# Patient Record
Sex: MALE | Race: WHITE | HISPANIC OR LATINO
[De-identification: names, ages, dates, MRNs, and addresses within clinical notes are randomized per-mention and may not be internally consistent; named-entity substitution may affect disease eponyms.]

---

## 2017-08-25 ENCOUNTER — APPOINTMENT (OUTPATIENT)
Dept: INTERNAL MEDICINE | Facility: CLINIC | Age: 72
End: 2017-08-25
Payer: MEDICARE

## 2017-08-25 PROCEDURE — 99214 OFFICE O/P EST MOD 30 MIN: CPT

## 2018-02-26 ENCOUNTER — APPOINTMENT (OUTPATIENT)
Dept: INTERNAL MEDICINE | Facility: CLINIC | Age: 73
End: 2018-02-26
Payer: MEDICARE

## 2018-02-26 PROCEDURE — 99214 OFFICE O/P EST MOD 30 MIN: CPT

## 2018-05-25 ENCOUNTER — APPOINTMENT (OUTPATIENT)
Dept: INTERNAL MEDICINE | Facility: CLINIC | Age: 73
End: 2018-05-25
Payer: MEDICARE

## 2018-05-25 PROCEDURE — G0439: CPT | Mod: 25

## 2018-05-25 PROCEDURE — 93000 ELECTROCARDIOGRAM COMPLETE: CPT

## 2018-09-14 ENCOUNTER — APPOINTMENT (OUTPATIENT)
Dept: INTERNAL MEDICINE | Facility: CLINIC | Age: 73
End: 2018-09-14
Payer: MEDICARE

## 2018-09-14 VITALS
SYSTOLIC BLOOD PRESSURE: 150 MMHG | HEART RATE: 69 BPM | OXYGEN SATURATION: 95 % | DIASTOLIC BLOOD PRESSURE: 73 MMHG | TEMPERATURE: 98.1 F | RESPIRATION RATE: 12 BRPM | WEIGHT: 194 LBS

## 2018-09-14 DIAGNOSIS — Z87.11 PERSONAL HISTORY OF PEPTIC ULCER DISEASE: ICD-10-CM

## 2018-09-14 DIAGNOSIS — Z63.4 DISAPPEARANCE AND DEATH OF FAMILY MEMBER: ICD-10-CM

## 2018-09-14 DIAGNOSIS — M25.561 PAIN IN RIGHT KNEE: ICD-10-CM

## 2018-09-14 DIAGNOSIS — Z83.3 FAMILY HISTORY OF DIABETES MELLITUS: ICD-10-CM

## 2018-09-14 PROCEDURE — 99214 OFFICE O/P EST MOD 30 MIN: CPT

## 2018-09-14 SDOH — SOCIAL STABILITY - SOCIAL INSECURITY: DISSAPEARANCE AND DEATH OF FAMILY MEMBER: Z63.4

## 2018-09-14 NOTE — PHYSICAL EXAM

## 2018-09-14 NOTE — ASSESSMENT
[FreeTextEntry1] : F/U VISIT OF 72 Y OLD MALE WITH PMX OF HTN= STABLE ON CURRENT MEDS\par DYSLIPIDEMIA= CONTINUE CRESTOR DAILY\par IFG= DIET AND EXERCISE\par R KNEE ACUTE PAIN= LIMITED EXERCISE AS TOLERATED \par RTO 3 M WITH LABS

## 2018-09-14 NOTE — HISTORY OF PRESENT ILLNESS
[Family Member] : family member [de-identified] : PT COMES FOR F/U LABS AND HTN/IFG AND DYSLIPIDEMIA CONTROL.\par PT DEVELOPED R KNEE PAIN WHILE ON TREADMILL LAST WEEK,APPLIED ICE AND FEELING BETTER

## 2018-12-04 LAB
ALBUMIN SERPL ELPH-MCNC: 4.5 G/DL
ALP BLD-CCNC: 78 U/L
ALT SERPL-CCNC: 31 U/L
ANION GAP SERPL CALC-SCNC: 11 MMOL/L
AST SERPL-CCNC: 26 U/L
BILIRUB SERPL-MCNC: 0.6 MG/DL
BUN SERPL-MCNC: 18 MG/DL
CALCIUM SERPL-MCNC: 9.7 MG/DL
CHLORIDE SERPL-SCNC: 106 MMOL/L
CHOLEST SERPL-MCNC: 154 MG/DL
CHOLEST/HDLC SERPL: 3.5 RATIO
CO2 SERPL-SCNC: 27 MMOL/L
CREAT SERPL-MCNC: 1.18 MG/DL
GLUCOSE SERPL-MCNC: 106 MG/DL
HBA1C MFR BLD HPLC: 6.2 %
HDLC SERPL-MCNC: 44 MG/DL
LDLC SERPL CALC-MCNC: 82 MG/DL
POTASSIUM SERPL-SCNC: 4.7 MMOL/L
PROT SERPL-MCNC: 7.9 G/DL
SODIUM SERPL-SCNC: 144 MMOL/L
TRIGL SERPL-MCNC: 142 MG/DL

## 2018-12-10 ENCOUNTER — APPOINTMENT (OUTPATIENT)
Dept: INTERNAL MEDICINE | Facility: CLINIC | Age: 73
End: 2018-12-10
Payer: MEDICARE

## 2018-12-10 VITALS — DIASTOLIC BLOOD PRESSURE: 75 MMHG | SYSTOLIC BLOOD PRESSURE: 125 MMHG

## 2018-12-10 VITALS
WEIGHT: 189 LBS | TEMPERATURE: 97.7 F | HEART RATE: 101 BPM | DIASTOLIC BLOOD PRESSURE: 73 MMHG | SYSTOLIC BLOOD PRESSURE: 138 MMHG | OXYGEN SATURATION: 95 %

## 2018-12-10 PROCEDURE — 99214 OFFICE O/P EST MOD 30 MIN: CPT

## 2018-12-10 NOTE — ASSESSMENT
[FreeTextEntry1] : F/U VISIT OF 73 Y OLD MALE WITH PMX OF HTN= CONTINUE AMLODIPINE AND OLMESARTAN THE SAME\par DYSLIPIDEMIA= STABLE ON ROSUVASTATIN 20 MG DAILY\par GERD= STABLE\par IFG= HBA1C OF 6.2= LOW CARB DIET AND WT LOSS/EXERCISE RECOMM\par RTO 3 M WITH LABS

## 2019-03-21 LAB
ALBUMIN SERPL ELPH-MCNC: 4.2 G/DL
ALP BLD-CCNC: 64 U/L
ALT SERPL-CCNC: 27 U/L
ANION GAP SERPL CALC-SCNC: 11 MMOL/L
AST SERPL-CCNC: 33 U/L
BILIRUB SERPL-MCNC: 0.5 MG/DL
BUN SERPL-MCNC: 18 MG/DL
CALCIUM SERPL-MCNC: 9.5 MG/DL
CHLORIDE SERPL-SCNC: 104 MMOL/L
CHOLEST SERPL-MCNC: 150 MG/DL
CHOLEST/HDLC SERPL: 3.3 RATIO
CO2 SERPL-SCNC: 26 MMOL/L
CREAT SERPL-MCNC: 1.03 MG/DL
GLUCOSE SERPL-MCNC: 92 MG/DL
HDLC SERPL-MCNC: 45 MG/DL
LDLC SERPL CALC-MCNC: 85 MG/DL
POTASSIUM SERPL-SCNC: 4.6 MMOL/L
PROT SERPL-MCNC: 7.8 G/DL
SODIUM SERPL-SCNC: 141 MMOL/L
TRIGL SERPL-MCNC: 102 MG/DL

## 2019-03-27 ENCOUNTER — APPOINTMENT (OUTPATIENT)
Dept: INTERNAL MEDICINE | Facility: CLINIC | Age: 74
End: 2019-03-27
Payer: MEDICARE

## 2019-03-27 VITALS
OXYGEN SATURATION: 97 % | TEMPERATURE: 97.3 F | HEART RATE: 88 BPM | BODY MASS INDEX: 29.1 KG/M2 | WEIGHT: 192 LBS | SYSTOLIC BLOOD PRESSURE: 156 MMHG | RESPIRATION RATE: 12 BRPM | HEIGHT: 68 IN | DIASTOLIC BLOOD PRESSURE: 77 MMHG

## 2019-03-27 VITALS — SYSTOLIC BLOOD PRESSURE: 140 MMHG | DIASTOLIC BLOOD PRESSURE: 70 MMHG

## 2019-03-27 PROCEDURE — 99214 OFFICE O/P EST MOD 30 MIN: CPT

## 2019-03-27 NOTE — ASSESSMENT
[FreeTextEntry1] : F/U VISIT OF 73 Y OLD MALE WITH PMX OF HTN= STABLE ON CURRENT MEDS\par DYSLIPIDEMIA= CONTINUE CRESTOR DAILY\par HBA1C OF 6.6= LOW CARB DIET AND LABS IN 3M \par RTO FOR CPE

## 2019-04-01 LAB — HBA1C MFR BLD HPLC: 6.6 %

## 2019-04-12 ENCOUNTER — RX RENEWAL (OUTPATIENT)
Age: 74
End: 2019-04-12

## 2019-06-13 ENCOUNTER — LABORATORY RESULT (OUTPATIENT)
Age: 74
End: 2019-06-13

## 2019-06-17 ENCOUNTER — APPOINTMENT (OUTPATIENT)
Dept: INTERNAL MEDICINE | Facility: CLINIC | Age: 74
End: 2019-06-17
Payer: MEDICARE

## 2019-06-17 ENCOUNTER — NON-APPOINTMENT (OUTPATIENT)
Age: 74
End: 2019-06-17

## 2019-06-17 VITALS
HEART RATE: 78 BPM | TEMPERATURE: 99.1 F | RESPIRATION RATE: 14 BRPM | HEIGHT: 68 IN | BODY MASS INDEX: 28.19 KG/M2 | SYSTOLIC BLOOD PRESSURE: 136 MMHG | OXYGEN SATURATION: 95 % | WEIGHT: 186 LBS | DIASTOLIC BLOOD PRESSURE: 68 MMHG

## 2019-06-17 LAB
25(OH)D3 SERPL-MCNC: 27.8 NG/ML
ALBUMIN SERPL ELPH-MCNC: 4.7 G/DL
ALP BLD-CCNC: 77 U/L
ALT SERPL-CCNC: 29 U/L
ANION GAP SERPL CALC-SCNC: 12 MMOL/L
APPEARANCE: CLEAR
AST SERPL-CCNC: 28 U/L
BASOPHILS # BLD AUTO: 0.07 K/UL
BASOPHILS NFR BLD AUTO: 0.9 %
BILIRUB SERPL-MCNC: 0.7 MG/DL
BILIRUBIN URINE: NEGATIVE
BLOOD URINE: NEGATIVE
BUN SERPL-MCNC: 21 MG/DL
CALCIUM SERPL-MCNC: 9.6 MG/DL
CHLORIDE SERPL-SCNC: 102 MMOL/L
CHOLEST SERPL-MCNC: 152 MG/DL
CHOLEST/HDLC SERPL: 3.5 RATIO
CO2 SERPL-SCNC: 26 MMOL/L
COLOR: YELLOW
CREAT SERPL-MCNC: 1.3 MG/DL
EOSINOPHIL # BLD AUTO: 0.9 K/UL
EOSINOPHIL NFR BLD AUTO: 12 %
ESTIMATED AVERAGE GLUCOSE: 131 MG/DL
GLUCOSE QUALITATIVE U: NEGATIVE
GLUCOSE SERPL-MCNC: 98 MG/DL
HBA1C MFR BLD HPLC: 6.2 %
HCT VFR BLD CALC: 45.3 %
HDLC SERPL-MCNC: 43 MG/DL
HGB BLD-MCNC: 13.8 G/DL
IMM GRANULOCYTES NFR BLD AUTO: 0.1 %
KETONES URINE: NEGATIVE
LDLC SERPL CALC-MCNC: 86 MG/DL
LEUKOCYTE ESTERASE URINE: NEGATIVE
LYMPHOCYTES # BLD AUTO: 1.89 K/UL
LYMPHOCYTES NFR BLD AUTO: 25.2 %
MAN DIFF?: NORMAL
MCHC RBC-ENTMCNC: 29.5 PG
MCHC RBC-ENTMCNC: 30.5 GM/DL
MCV RBC AUTO: 96.8 FL
MONOCYTES # BLD AUTO: 0.56 K/UL
MONOCYTES NFR BLD AUTO: 7.5 %
NEUTROPHILS # BLD AUTO: 4.06 K/UL
NEUTROPHILS NFR BLD AUTO: 54.3 %
NITRITE URINE: NEGATIVE
PH URINE: 5
PLATELET # BLD AUTO: 233 K/UL
POTASSIUM SERPL-SCNC: 5.3 MMOL/L
PROT SERPL-MCNC: 7.6 G/DL
PROTEIN URINE: NEGATIVE
RBC # BLD: 4.68 M/UL
RBC # FLD: 14.5 %
SODIUM SERPL-SCNC: 140 MMOL/L
SPECIFIC GRAVITY URINE: 1.02
TRIGL SERPL-MCNC: 113 MG/DL
TSH SERPL-ACNC: 1.12 UIU/ML
UROBILINOGEN URINE: NORMAL
WBC # FLD AUTO: 7.49 K/UL

## 2019-06-17 PROCEDURE — 93000 ELECTROCARDIOGRAM COMPLETE: CPT

## 2019-06-17 PROCEDURE — 99397 PER PM REEVAL EST PAT 65+ YR: CPT

## 2019-06-17 NOTE — ASSESSMENT
[FreeTextEntry1] : CPE OF 73 Y OLD MALE WITH PMX OF HTN ,DYSLIPIDEMIA ,IFG AND BPH= CONTINUE CURRENT MEDS\par EKG TODAY \par RTO IN 3 M OR PRN

## 2019-07-24 ENCOUNTER — OTHER (OUTPATIENT)
Age: 74
End: 2019-07-24

## 2019-09-22 LAB
ALBUMIN SERPL ELPH-MCNC: 4.6 G/DL
ALP BLD-CCNC: 71 U/L
ALT SERPL-CCNC: 29 U/L
ANION GAP SERPL CALC-SCNC: 12 MMOL/L
AST SERPL-CCNC: 29 U/L
BILIRUB SERPL-MCNC: 0.6 MG/DL
BUN SERPL-MCNC: 22 MG/DL
CALCIUM SERPL-MCNC: 9.6 MG/DL
CHLORIDE SERPL-SCNC: 107 MMOL/L
CHOLEST SERPL-MCNC: 149 MG/DL
CHOLEST/HDLC SERPL: 3.3 RATIO
CO2 SERPL-SCNC: 25 MMOL/L
CREAT SERPL-MCNC: 1.17 MG/DL
ESTIMATED AVERAGE GLUCOSE: 131 MG/DL
GLUCOSE SERPL-MCNC: 96 MG/DL
HBA1C MFR BLD HPLC: 6.2 %
HDLC SERPL-MCNC: 45 MG/DL
LDLC SERPL CALC-MCNC: 81 MG/DL
POTASSIUM SERPL-SCNC: 4.8 MMOL/L
PROT SERPL-MCNC: 7.5 G/DL
SODIUM SERPL-SCNC: 144 MMOL/L
TRIGL SERPL-MCNC: 117 MG/DL

## 2019-09-23 ENCOUNTER — APPOINTMENT (OUTPATIENT)
Dept: INTERNAL MEDICINE | Facility: CLINIC | Age: 74
End: 2019-09-23
Payer: MEDICARE

## 2019-09-23 VITALS
TEMPERATURE: 99 F | WEIGHT: 188 LBS | OXYGEN SATURATION: 95 % | DIASTOLIC BLOOD PRESSURE: 74 MMHG | HEIGHT: 68 IN | BODY MASS INDEX: 28.49 KG/M2 | RESPIRATION RATE: 12 BRPM | HEART RATE: 82 BPM | SYSTOLIC BLOOD PRESSURE: 148 MMHG

## 2019-09-23 VITALS — SYSTOLIC BLOOD PRESSURE: 135 MMHG | DIASTOLIC BLOOD PRESSURE: 80 MMHG

## 2019-09-23 PROCEDURE — 99214 OFFICE O/P EST MOD 30 MIN: CPT

## 2019-09-23 NOTE — ASSESSMENT
[FreeTextEntry1] : F/U VISIT OF 73 Y OLD MALE WITH PMX OF HTN = STABLE\par DYSLIPIDEMIA= STABLE\par IFG = HB1C 6.2 \par EDMOND AND ASTHMA= RX SENT\par DECREASE VIT D3 TO 2000 IU EOD DUE TO GI SIDE EFFECTS\par RTO IN 3 M

## 2019-09-23 NOTE — HISTORY OF PRESENT ILLNESS
[de-identified] : PT COMES FOR F/U HTN ,IFG AND DYSLIPIDEMIA\par CC OF LOOSE BM WHEN TAKING VIT D 2000 IU DAILY\par NEEDS FLONASE AND ALBUTEROL INH FOR SPORADIC USE FOR EDMOND AND ASTHMA

## 2019-10-28 ENCOUNTER — MEDICATION RENEWAL (OUTPATIENT)
Age: 74
End: 2019-10-28

## 2019-10-28 ENCOUNTER — MESSAGE (OUTPATIENT)
Age: 74
End: 2019-10-28

## 2019-12-18 ENCOUNTER — APPOINTMENT (OUTPATIENT)
Dept: HEART AND VASCULAR | Facility: CLINIC | Age: 74
End: 2019-12-18
Payer: MEDICARE

## 2019-12-18 PROCEDURE — 36415 COLL VENOUS BLD VENIPUNCTURE: CPT

## 2019-12-19 LAB
ALBUMIN SERPL ELPH-MCNC: 4.3 G/DL
ALP BLD-CCNC: 83 U/L
ALT SERPL-CCNC: 28 U/L
ANION GAP SERPL CALC-SCNC: 16 MMOL/L
AST SERPL-CCNC: 26 U/L
BILIRUB SERPL-MCNC: 0.5 MG/DL
BUN SERPL-MCNC: 26 MG/DL
CALCIUM SERPL-MCNC: 9.6 MG/DL
CHLORIDE SERPL-SCNC: 101 MMOL/L
CHOLEST SERPL-MCNC: 203 MG/DL
CHOLEST/HDLC SERPL: 4.8 RATIO
CO2 SERPL-SCNC: 23 MMOL/L
CREAT SERPL-MCNC: 1.31 MG/DL
ESTIMATED AVERAGE GLUCOSE: 137 MG/DL
GLUCOSE SERPL-MCNC: 109 MG/DL
HBA1C MFR BLD HPLC: 6.4 %
HDLC SERPL-MCNC: 42 MG/DL
LDLC SERPL CALC-MCNC: 132 MG/DL
POTASSIUM SERPL-SCNC: 5.2 MMOL/L
PROT SERPL-MCNC: 7.4 G/DL
SODIUM SERPL-SCNC: 140 MMOL/L
TRIGL SERPL-MCNC: 146 MG/DL

## 2019-12-23 ENCOUNTER — APPOINTMENT (OUTPATIENT)
Dept: INTERNAL MEDICINE | Facility: CLINIC | Age: 74
End: 2019-12-23
Payer: MEDICARE

## 2019-12-23 VITALS
WEIGHT: 182 LBS | TEMPERATURE: 98.9 F | HEIGHT: 68 IN | OXYGEN SATURATION: 95 % | RESPIRATION RATE: 14 BRPM | DIASTOLIC BLOOD PRESSURE: 65 MMHG | SYSTOLIC BLOOD PRESSURE: 116 MMHG | BODY MASS INDEX: 27.58 KG/M2 | HEART RATE: 103 BPM

## 2019-12-23 DIAGNOSIS — L85.3 XEROSIS CUTIS: ICD-10-CM

## 2019-12-23 DIAGNOSIS — H61.21 IMPACTED CERUMEN, RIGHT EAR: ICD-10-CM

## 2019-12-23 PROCEDURE — 99214 OFFICE O/P EST MOD 30 MIN: CPT

## 2019-12-23 NOTE — ASSESSMENT
[FreeTextEntry1] : F/U VISIT OF 74 Y OLD MALE WITH PMX OF HTN = STABLE \par IFG= STRICT DIET\par DYSLIPIDEMIA= COMPLIANCE WITH ROSUVASTATIN RECOMM \par CERUMEN IMPACTION R EAR= DEBROX RX\par DRY SKIN DERMATITIS= LACTATE 12 % CREAM

## 2019-12-23 NOTE — HISTORY OF PRESENT ILLNESS
[de-identified] : PT COMES FOR F/U \par CC OF DECREASED HEARING R EAR FOR 2 WEEKS AFTER ATTEMPTING TO CLEAN IT WITH Q TIP\par DRY AND ITCHY SKIN SPECIALLY IN THE EVENINGS\par HAS BEEN NON COMPLAINT WITH DIET AND ROSUVASTATIN ON /OFF LAST MONTH OR SO

## 2019-12-23 NOTE — PHYSICAL EXAM
[No Acute Distress] : no acute distress [Well Nourished] : well nourished [Well Developed] : well developed [Well-Appearing] : well-appearing [Normal Sclera/Conjunctiva] : normal sclera/conjunctiva [PERRL] : pupils equal round and reactive to light [Normal Outer Ear/Nose] : the outer ears and nose were normal in appearance [EOMI] : extraocular movements intact [Normal Oropharynx] : the oropharynx was normal [No JVD] : no jugular venous distention [No Lymphadenopathy] : no lymphadenopathy [Supple] : supple [Thyroid Normal, No Nodules] : the thyroid was normal and there were no nodules present [No Respiratory Distress] : no respiratory distress  [No Accessory Muscle Use] : no accessory muscle use [Normal Rate] : normal rate  [Clear to Auscultation] : lungs were clear to auscultation bilaterally [Normal S1, S2] : normal S1 and S2 [Regular Rhythm] : with a regular rhythm [No Murmur] : no murmur heard [No Carotid Bruits] : no carotid bruits [No Varicosities] : no varicosities [No Abdominal Bruit] : a ~M bruit was not heard ~T in the abdomen [Pedal Pulses Present] : the pedal pulses are present [No Edema] : there was no peripheral edema [No Palpable Aorta] : no palpable aorta [No Extremity Clubbing/Cyanosis] : no extremity clubbing/cyanosis [Soft] : abdomen soft [Non Tender] : non-tender [Non-distended] : non-distended [No Masses] : no abdominal mass palpated [No HSM] : no HSM [Normal Bowel Sounds] : normal bowel sounds [Normal Posterior Cervical Nodes] : no posterior cervical lymphadenopathy [Normal Anterior Cervical Nodes] : no anterior cervical lymphadenopathy [No CVA Tenderness] : no CVA  tenderness [No Spinal Tenderness] : no spinal tenderness [Grossly Normal Strength/Tone] : grossly normal strength/tone [No Joint Swelling] : no joint swelling [No Rash] : no rash [Coordination Grossly Intact] : coordination grossly intact [No Focal Deficits] : no focal deficits [Normal Gait] : normal gait [Deep Tendon Reflexes (DTR)] : deep tendon reflexes were 2+ and symmetric [Normal Affect] : the affect was normal [Normal Insight/Judgement] : insight and judgment were intact [de-identified] : R CERUMEN IMPACTION

## 2020-03-27 ENCOUNTER — APPOINTMENT (OUTPATIENT)
Dept: INTERNAL MEDICINE | Facility: CLINIC | Age: 75
End: 2020-03-27

## 2020-05-01 ENCOUNTER — LABORATORY RESULT (OUTPATIENT)
Age: 75
End: 2020-05-01

## 2020-05-04 ENCOUNTER — LABORATORY RESULT (OUTPATIENT)
Age: 75
End: 2020-05-04

## 2020-05-04 ENCOUNTER — APPOINTMENT (OUTPATIENT)
Dept: INTERNAL MEDICINE | Facility: CLINIC | Age: 75
End: 2020-05-04
Payer: MEDICARE

## 2020-05-04 VITALS
TEMPERATURE: 98 F | SYSTOLIC BLOOD PRESSURE: 150 MMHG | WEIGHT: 179 LBS | OXYGEN SATURATION: 96 % | HEIGHT: 68 IN | HEART RATE: 86 BPM | DIASTOLIC BLOOD PRESSURE: 74 MMHG | BODY MASS INDEX: 27.13 KG/M2 | RESPIRATION RATE: 12 BRPM

## 2020-05-04 PROCEDURE — 99214 OFFICE O/P EST MOD 30 MIN: CPT

## 2020-05-04 NOTE — ASSESSMENT
[FreeTextEntry1] : F/U VISIT OF 74 Y OLD MALE WITH PMX OF IFG= HBA1C DOWN TO 6.2 ,CONTINUE DIET AND EXERCISE\par HTN = STABLE \par DYSLIPIDEMIA= CHOL IS BETTER,CONTINUE ROSUVASTATIN \par RTO FOR CPE 8/2020;LABS ORDERED,MEDS RX SENT

## 2020-05-04 NOTE — PHYSICAL EXAM
[No Acute Distress] : no acute distress [Well Nourished] : well nourished [Well Developed] : well developed [Well-Appearing] : well-appearing [Normal Sclera/Conjunctiva] : normal sclera/conjunctiva [EOMI] : extraocular movements intact [PERRL] : pupils equal round and reactive to light [No Lymphadenopathy] : no lymphadenopathy [No JVD] : no jugular venous distention [Supple] : supple [Thyroid Normal, No Nodules] : the thyroid was normal and there were no nodules present [No Respiratory Distress] : no respiratory distress  [No Accessory Muscle Use] : no accessory muscle use [Clear to Auscultation] : lungs were clear to auscultation bilaterally [Normal Rate] : normal rate  [Regular Rhythm] : with a regular rhythm [Normal S1, S2] : normal S1 and S2 [No Murmur] : no murmur heard [No Carotid Bruits] : no carotid bruits [No Abdominal Bruit] : a ~M bruit was not heard ~T in the abdomen [No Varicosities] : no varicosities [Pedal Pulses Present] : the pedal pulses are present [No Edema] : there was no peripheral edema [No Palpable Aorta] : no palpable aorta [No Extremity Clubbing/Cyanosis] : no extremity clubbing/cyanosis [Soft] : abdomen soft [Non-distended] : non-distended [Non Tender] : non-tender [No Masses] : no abdominal mass palpated [Normal Bowel Sounds] : normal bowel sounds [No HSM] : no HSM [Normal Posterior Cervical Nodes] : no posterior cervical lymphadenopathy [Normal Anterior Cervical Nodes] : no anterior cervical lymphadenopathy [No CVA Tenderness] : no CVA  tenderness [No Spinal Tenderness] : no spinal tenderness [No Joint Swelling] : no joint swelling [Grossly Normal Strength/Tone] : grossly normal strength/tone [Coordination Grossly Intact] : coordination grossly intact [No Rash] : no rash [No Focal Deficits] : no focal deficits [Normal Gait] : normal gait [Deep Tendon Reflexes (DTR)] : deep tendon reflexes were 2+ and symmetric [Normal Insight/Judgement] : insight and judgment were intact [Normal Affect] : the affect was normal

## 2020-05-04 NOTE — HISTORY OF PRESENT ILLNESS
[de-identified] : PT COMES FOR F/U HTN ,DYSLIPIDEMIA  AND IFG \par FEELING FINE EXERCISES DAILY AND OFFERS NO COMPLAINS

## 2020-07-30 ENCOUNTER — LABORATORY RESULT (OUTPATIENT)
Age: 75
End: 2020-07-30

## 2020-07-30 ENCOUNTER — APPOINTMENT (OUTPATIENT)
Dept: HEART AND VASCULAR | Facility: CLINIC | Age: 75
End: 2020-07-30
Payer: MEDICARE

## 2020-07-30 PROCEDURE — 36415 COLL VENOUS BLD VENIPUNCTURE: CPT

## 2020-07-31 LAB
25(OH)D3 SERPL-MCNC: 31.5 NG/ML
ALBUMIN SERPL ELPH-MCNC: 4.7 G/DL
ALP BLD-CCNC: 80 U/L
ALT SERPL-CCNC: 29 U/L
ANION GAP SERPL CALC-SCNC: 17 MMOL/L
APPEARANCE: ABNORMAL
AST SERPL-CCNC: 28 U/L
BASOPHILS # BLD AUTO: 0.09 K/UL
BASOPHILS NFR BLD AUTO: 1.3 %
BILIRUB SERPL-MCNC: 0.7 MG/DL
BILIRUBIN URINE: NEGATIVE
BLOOD URINE: NORMAL
BUN SERPL-MCNC: 20 MG/DL
CALCIUM SERPL-MCNC: 9.2 MG/DL
CHLORIDE SERPL-SCNC: 106 MMOL/L
CHOLEST SERPL-MCNC: 144 MG/DL
CHOLEST/HDLC SERPL: 3.1 RATIO
CO2 SERPL-SCNC: 19 MMOL/L
COLOR: YELLOW
CREAT SERPL-MCNC: 1.16 MG/DL
EOSINOPHIL # BLD AUTO: 0.4 K/UL
EOSINOPHIL NFR BLD AUTO: 5.6 %
ESTIMATED AVERAGE GLUCOSE: 128 MG/DL
GLUCOSE QUALITATIVE U: NEGATIVE
GLUCOSE SERPL-MCNC: 111 MG/DL
HBA1C MFR BLD HPLC: 6.1 %
HCT VFR BLD CALC: 44.8 %
HDLC SERPL-MCNC: 46 MG/DL
HGB BLD-MCNC: 14.3 G/DL
IMM GRANULOCYTES NFR BLD AUTO: 0.3 %
KETONES URINE: NEGATIVE
LDLC SERPL CALC-MCNC: 80 MG/DL
LEUKOCYTE ESTERASE URINE: NEGATIVE
LYMPHOCYTES # BLD AUTO: 1.09 K/UL
LYMPHOCYTES NFR BLD AUTO: 15.2 %
MAN DIFF?: NORMAL
MCHC RBC-ENTMCNC: 29.5 PG
MCHC RBC-ENTMCNC: 31.9 GM/DL
MCV RBC AUTO: 92.4 FL
MONOCYTES # BLD AUTO: 0.47 K/UL
MONOCYTES NFR BLD AUTO: 6.5 %
NEUTROPHILS # BLD AUTO: 5.12 K/UL
NEUTROPHILS NFR BLD AUTO: 71.1 %
NITRITE URINE: NEGATIVE
PH URINE: 5
PLATELET # BLD AUTO: 245 K/UL
POTASSIUM SERPL-SCNC: 4.5 MMOL/L
PROT SERPL-MCNC: 7.5 G/DL
PROTEIN URINE: NORMAL
PSA FREE FLD-MCNC: 18 %
PSA FREE SERPL-MCNC: 1.1 NG/ML
PSA SERPL-MCNC: 6.22 NG/ML
RBC # BLD: 4.85 M/UL
RBC # FLD: 13.6 %
SODIUM SERPL-SCNC: 142 MMOL/L
SPECIFIC GRAVITY URINE: 1.02
TRIGL SERPL-MCNC: 89 MG/DL
TSH SERPL-ACNC: 0.63 UIU/ML
UROBILINOGEN URINE: NORMAL
WBC # FLD AUTO: 7.19 K/UL

## 2020-08-05 ENCOUNTER — NON-APPOINTMENT (OUTPATIENT)
Age: 75
End: 2020-08-05

## 2020-08-05 ENCOUNTER — APPOINTMENT (OUTPATIENT)
Dept: INTERNAL MEDICINE | Facility: CLINIC | Age: 75
End: 2020-08-05
Payer: MEDICARE

## 2020-08-05 VITALS
HEIGHT: 68 IN | TEMPERATURE: 98.1 F | DIASTOLIC BLOOD PRESSURE: 67 MMHG | HEART RATE: 94 BPM | OXYGEN SATURATION: 96 % | SYSTOLIC BLOOD PRESSURE: 142 MMHG | WEIGHT: 175 LBS | BODY MASS INDEX: 26.52 KG/M2 | RESPIRATION RATE: 14 BRPM

## 2020-08-05 PROCEDURE — 93000 ELECTROCARDIOGRAM COMPLETE: CPT

## 2020-08-05 PROCEDURE — G0439: CPT

## 2020-08-05 RX ORDER — ASPIRIN 81 MG
6.5 TABLET, DELAYED RELEASE (ENTERIC COATED) ORAL
Qty: 1 | Refills: 0 | Status: DISCONTINUED | COMMUNITY
Start: 2019-12-23 | End: 2020-08-05

## 2020-08-05 NOTE — ASSESSMENT
[FreeTextEntry1] : CPE OF 74 Y OLD MALE WITH PMX OF HTN ,DYSLIPIDEMIA AND IFG = STABLE ON AMLODIPINE ,CRESTOR AND OLMESARTAN\par HIGH PSA= AS PER  ,FOR MRI THIS WEEK\par RTO IN 3 M OR PRN

## 2020-08-05 NOTE — HISTORY OF PRESENT ILLNESS
[de-identified] : PT SEEN BY  LAST WEEK FOR ELEVATED PSA AND WILL AHVE MRI IN 2 DAYS\par HAD BIOPSY NEG 3 Y AGO ,HAD ? TURP 20 Y AGO ;HAS NO  SX WHATSOEVER\par CC OF CANKER SORE FEW WEEKS AGO ,GETTING BETTER NOW

## 2020-08-05 NOTE — PHYSICAL EXAM
[No Acute Distress] : no acute distress [Well Developed] : well developed [Well Nourished] : well nourished [Normal Sclera/Conjunctiva] : normal sclera/conjunctiva [PERRL] : pupils equal round and reactive to light [Well-Appearing] : well-appearing [EOMI] : extraocular movements intact [Normal Outer Ear/Nose] : the outer ears and nose were normal in appearance [Normal Oropharynx] : the oropharynx was normal [No JVD] : no jugular venous distention [No Lymphadenopathy] : no lymphadenopathy [No Respiratory Distress] : no respiratory distress  [Thyroid Normal, No Nodules] : the thyroid was normal and there were no nodules present [Supple] : supple [Clear to Auscultation] : lungs were clear to auscultation bilaterally [Normal Rate] : normal rate  [No Accessory Muscle Use] : no accessory muscle use [Regular Rhythm] : with a regular rhythm [Normal S1, S2] : normal S1 and S2 [No Murmur] : no murmur heard [No Abdominal Bruit] : a ~M bruit was not heard ~T in the abdomen [No Carotid Bruits] : no carotid bruits [Pedal Pulses Present] : the pedal pulses are present [No Edema] : there was no peripheral edema [No Varicosities] : no varicosities [No Extremity Clubbing/Cyanosis] : no extremity clubbing/cyanosis [Soft] : abdomen soft [No Palpable Aorta] : no palpable aorta [Non Tender] : non-tender [Non-distended] : non-distended [No Masses] : no abdominal mass palpated [No HSM] : no HSM [Normal Anterior Cervical Nodes] : no anterior cervical lymphadenopathy [Normal Bowel Sounds] : normal bowel sounds [Normal Posterior Cervical Nodes] : no posterior cervical lymphadenopathy [No CVA Tenderness] : no CVA  tenderness [No Spinal Tenderness] : no spinal tenderness [No Joint Swelling] : no joint swelling [No Rash] : no rash [Grossly Normal Strength/Tone] : grossly normal strength/tone [No Focal Deficits] : no focal deficits [Coordination Grossly Intact] : coordination grossly intact [Normal Gait] : normal gait [Normal Affect] : the affect was normal [Normal Insight/Judgement] : insight and judgment were intact [de-identified] : SINGLE LEFT ORAL MUCOSAE CANKER SORE

## 2020-11-06 ENCOUNTER — APPOINTMENT (OUTPATIENT)
Dept: HEART AND VASCULAR | Facility: CLINIC | Age: 75
End: 2020-11-06
Payer: MEDICARE

## 2020-11-06 PROCEDURE — 36415 COLL VENOUS BLD VENIPUNCTURE: CPT

## 2020-11-06 PROCEDURE — 99072 ADDL SUPL MATRL&STAF TM PHE: CPT

## 2020-11-07 LAB
ALBUMIN SERPL ELPH-MCNC: 4.6 G/DL
ALP BLD-CCNC: 81 U/L
ALT SERPL-CCNC: 33 U/L
ANION GAP SERPL CALC-SCNC: 10 MMOL/L
AST SERPL-CCNC: 30 U/L
BILIRUB SERPL-MCNC: 0.5 MG/DL
BUN SERPL-MCNC: 24 MG/DL
CALCIUM SERPL-MCNC: 9.7 MG/DL
CHLORIDE SERPL-SCNC: 107 MMOL/L
CHOLEST SERPL-MCNC: 145 MG/DL
CO2 SERPL-SCNC: 27 MMOL/L
CREAT SERPL-MCNC: 1.4 MG/DL
ESTIMATED AVERAGE GLUCOSE: 143 MG/DL
GLUCOSE SERPL-MCNC: 101 MG/DL
HBA1C MFR BLD HPLC: 6.6 %
HDLC SERPL-MCNC: 46 MG/DL
LDLC SERPL CALC-MCNC: 82 MG/DL
NONHDLC SERPL-MCNC: 99 MG/DL
POTASSIUM SERPL-SCNC: 5.7 MMOL/L
PROT SERPL-MCNC: 7.6 G/DL
SODIUM SERPL-SCNC: 143 MMOL/L
TRIGL SERPL-MCNC: 82 MG/DL

## 2020-11-09 ENCOUNTER — APPOINTMENT (OUTPATIENT)
Dept: INTERNAL MEDICINE | Facility: CLINIC | Age: 75
End: 2020-11-09
Payer: MEDICARE

## 2020-11-09 VITALS
RESPIRATION RATE: 14 BRPM | HEIGHT: 68 IN | SYSTOLIC BLOOD PRESSURE: 145 MMHG | BODY MASS INDEX: 27.28 KG/M2 | OXYGEN SATURATION: 97 % | TEMPERATURE: 98.6 F | HEART RATE: 76 BPM | DIASTOLIC BLOOD PRESSURE: 72 MMHG | WEIGHT: 180 LBS

## 2020-11-09 DIAGNOSIS — Z23 ENCOUNTER FOR IMMUNIZATION: ICD-10-CM

## 2020-11-09 DIAGNOSIS — K13.79 OTHER LESIONS OF ORAL MUCOSA: ICD-10-CM

## 2020-11-09 PROCEDURE — 99214 OFFICE O/P EST MOD 30 MIN: CPT | Mod: 25

## 2020-11-09 PROCEDURE — G0008: CPT

## 2020-11-09 PROCEDURE — 90662 IIV NO PRSV INCREASED AG IM: CPT

## 2020-11-09 PROCEDURE — 99072 ADDL SUPL MATRL&STAF TM PHE: CPT

## 2020-11-09 NOTE — ASSESSMENT
[FreeTextEntry1] : 75 Y OLD MALE WITH PMX OF HTN  WITH ELEVATED K AT 5.7 AND CREATININE 1.4 1 WEEK AGO = BMP TODAY \par IFG=EARLY DM = STRICT LOW CARB DIET AND EXERCISE\par DYSLIPIDEMIA= STABLE\par INFLUENZA VACCINE TODAY \par RTO IN 3 M OR AS PER LABS

## 2020-11-09 NOTE — HISTORY OF PRESENT ILLNESS
[de-identified] : PT COMES FOR F/U OFFERS NO NEW COMPLAINS,STILL HAVING SOME MOUTH SORES LEFT SIDE FOR 3 M

## 2020-11-17 LAB
ANION GAP SERPL CALC-SCNC: 13 MMOL/L
BUN SERPL-MCNC: 18 MG/DL
CALCIUM SERPL-MCNC: 9.6 MG/DL
CHLORIDE SERPL-SCNC: 103 MMOL/L
CO2 SERPL-SCNC: 25 MMOL/L
CREAT SERPL-MCNC: 1.18 MG/DL
GLUCOSE SERPL-MCNC: 89 MG/DL
POTASSIUM SERPL-SCNC: 4.8 MMOL/L
SODIUM SERPL-SCNC: 140 MMOL/L

## 2021-02-05 ENCOUNTER — APPOINTMENT (OUTPATIENT)
Dept: HEART AND VASCULAR | Facility: CLINIC | Age: 76
End: 2021-02-05
Payer: MEDICARE

## 2021-02-05 PROCEDURE — 36415 COLL VENOUS BLD VENIPUNCTURE: CPT

## 2021-02-05 PROCEDURE — 99072 ADDL SUPL MATRL&STAF TM PHE: CPT

## 2021-02-10 ENCOUNTER — APPOINTMENT (OUTPATIENT)
Dept: INTERNAL MEDICINE | Facility: CLINIC | Age: 76
End: 2021-02-10
Payer: MEDICARE

## 2021-02-10 VITALS
RESPIRATION RATE: 14 BRPM | DIASTOLIC BLOOD PRESSURE: 64 MMHG | HEIGHT: 68 IN | TEMPERATURE: 98.2 F | WEIGHT: 180 LBS | SYSTOLIC BLOOD PRESSURE: 138 MMHG | BODY MASS INDEX: 27.28 KG/M2 | OXYGEN SATURATION: 95 % | HEART RATE: 88 BPM

## 2021-02-10 PROCEDURE — 99072 ADDL SUPL MATRL&STAF TM PHE: CPT

## 2021-02-10 PROCEDURE — 99214 OFFICE O/P EST MOD 30 MIN: CPT

## 2021-02-10 NOTE — HISTORY OF PRESENT ILLNESS
[de-identified] : PT COMES AFTER 3 M FOR F/U HTN ,DYSLIPIDEMIA AND EDMOND- ASTHMA\par CC OF DRY SKIN AND PRURITUS\par EXERCISES 1 HR DAILY

## 2021-02-10 NOTE — ASSESSMENT
[FreeTextEntry1] : 75 Y OLD MALE WITH PMX OF HTN ,IFG ,DYSLIPIDEMIA ,BPH AND GERD= LABS TODAY\par DRY SKIN DERMATITIS= AMMONIUM LACTATE 12% LOTION RX\par RTO IN 3 M

## 2021-02-11 LAB
ALBUMIN SERPL ELPH-MCNC: 4.5 G/DL
ALP BLD-CCNC: 85 U/L
ALT SERPL-CCNC: 30 U/L
ANION GAP SERPL CALC-SCNC: 15 MMOL/L
AST SERPL-CCNC: 26 U/L
BILIRUB SERPL-MCNC: 0.6 MG/DL
BUN SERPL-MCNC: 21 MG/DL
CALCIUM SERPL-MCNC: 9.4 MG/DL
CHLORIDE SERPL-SCNC: 103 MMOL/L
CHOLEST SERPL-MCNC: 144 MG/DL
CO2 SERPL-SCNC: 21 MMOL/L
CREAT SERPL-MCNC: 1.21 MG/DL
GLUCOSE SERPL-MCNC: 154 MG/DL
HDLC SERPL-MCNC: 43 MG/DL
LDLC SERPL CALC-MCNC: 69 MG/DL
NONHDLC SERPL-MCNC: 101 MG/DL
POTASSIUM SERPL-SCNC: 4.3 MMOL/L
PROT SERPL-MCNC: 7.6 G/DL
SODIUM SERPL-SCNC: 139 MMOL/L
TRIGL SERPL-MCNC: 158 MG/DL

## 2021-02-26 LAB
ESTIMATED AVERAGE GLUCOSE: 128 MG/DL
HBA1C MFR BLD HPLC: 6.1 %

## 2021-05-07 ENCOUNTER — APPOINTMENT (OUTPATIENT)
Dept: INTERNAL MEDICINE | Facility: CLINIC | Age: 76
End: 2021-05-07
Payer: MEDICARE

## 2021-05-07 VITALS
RESPIRATION RATE: 12 BRPM | WEIGHT: 184 LBS | SYSTOLIC BLOOD PRESSURE: 141 MMHG | TEMPERATURE: 97.4 F | DIASTOLIC BLOOD PRESSURE: 66 MMHG | OXYGEN SATURATION: 94 % | HEIGHT: 68 IN | BODY MASS INDEX: 27.89 KG/M2 | HEART RATE: 73 BPM

## 2021-05-07 LAB
ALBUMIN SERPL ELPH-MCNC: 4.6 G/DL
ALP BLD-CCNC: 77 U/L
ALT SERPL-CCNC: 33 U/L
ANION GAP SERPL CALC-SCNC: 11 MMOL/L
AST SERPL-CCNC: 27 U/L
BILIRUB SERPL-MCNC: 0.6 MG/DL
BUN SERPL-MCNC: 26 MG/DL
CALCIUM SERPL-MCNC: 9.4 MG/DL
CHLORIDE SERPL-SCNC: 106 MMOL/L
CO2 SERPL-SCNC: 25 MMOL/L
CREAT SERPL-MCNC: 1.18 MG/DL
ESTIMATED AVERAGE GLUCOSE: 137 MG/DL
GLUCOSE SERPL-MCNC: 96 MG/DL
HBA1C MFR BLD HPLC: 6.4 %
POTASSIUM SERPL-SCNC: 4.8 MMOL/L
PROT SERPL-MCNC: 7.8 G/DL
SODIUM SERPL-SCNC: 142 MMOL/L

## 2021-05-07 PROCEDURE — 99072 ADDL SUPL MATRL&STAF TM PHE: CPT

## 2021-05-07 PROCEDURE — 99214 OFFICE O/P EST MOD 30 MIN: CPT | Mod: 25

## 2021-05-07 NOTE — HISTORY OF PRESENT ILLNESS
[de-identified] : PT COMES FOR F/U HTN ,IFG NAD DYSLIPIDEMIA \par FEELS FINE \par HAD COVID-19 VACCINE X2

## 2021-05-07 NOTE — ASSESSMENT
[FreeTextEntry1] : F/U VISIT OF 75 Y OLD MALE WITH PMX OF HTN ,IFG NAD DYSLIPIDEMIA = LABS \par BPH = F/U  \par GERD= STABLE\par RTO IN 3 M

## 2021-05-14 LAB
CHOLEST SERPL-MCNC: 149 MG/DL
HDLC SERPL-MCNC: 47 MG/DL
LDLC SERPL CALC-MCNC: 84 MG/DL
NONHDLC SERPL-MCNC: 102 MG/DL
TRIGL SERPL-MCNC: 93 MG/DL

## 2021-08-02 ENCOUNTER — APPOINTMENT (OUTPATIENT)
Dept: HEART AND VASCULAR | Facility: CLINIC | Age: 76
End: 2021-08-02
Payer: MEDICARE

## 2021-08-02 LAB
25(OH)D3 SERPL-MCNC: 34.1 NG/ML
ALBUMIN SERPL ELPH-MCNC: 4.2 G/DL
ALP BLD-CCNC: 72 U/L
ALT SERPL-CCNC: 26 U/L
ANION GAP SERPL CALC-SCNC: 11 MMOL/L
APPEARANCE: CLEAR
AST SERPL-CCNC: 22 U/L
BACTERIA: NEGATIVE
BASOPHILS # BLD AUTO: 0.08 K/UL
BASOPHILS NFR BLD AUTO: 1 %
BILIRUB SERPL-MCNC: 0.6 MG/DL
BILIRUBIN URINE: NEGATIVE
BLOOD URINE: NEGATIVE
BUN SERPL-MCNC: 20 MG/DL
CALCIUM SERPL-MCNC: 8.9 MG/DL
CHLORIDE SERPL-SCNC: 107 MMOL/L
CHOLEST SERPL-MCNC: 122 MG/DL
CO2 SERPL-SCNC: 23 MMOL/L
COLOR: NORMAL
CREAT SERPL-MCNC: 1.09 MG/DL
EOSINOPHIL # BLD AUTO: 0.72 K/UL
EOSINOPHIL NFR BLD AUTO: 9.4 %
ESTIMATED AVERAGE GLUCOSE: 134 MG/DL
GLUCOSE QUALITATIVE U: NEGATIVE
GLUCOSE SERPL-MCNC: 97 MG/DL
HBA1C MFR BLD HPLC: 6.3 %
HCT VFR BLD CALC: 41.2 %
HDLC SERPL-MCNC: 43 MG/DL
HGB BLD-MCNC: 13.3 G/DL
HYALINE CASTS: 1 /LPF
IMM GRANULOCYTES NFR BLD AUTO: 0.3 %
KETONES URINE: NEGATIVE
LDLC SERPL CALC-MCNC: 62 MG/DL
LEUKOCYTE ESTERASE URINE: NEGATIVE
LYMPHOCYTES # BLD AUTO: 1.41 K/UL
LYMPHOCYTES NFR BLD AUTO: 18.5 %
MAN DIFF?: NORMAL
MCHC RBC-ENTMCNC: 29.8 PG
MCHC RBC-ENTMCNC: 32.3 GM/DL
MCV RBC AUTO: 92.4 FL
MICROSCOPIC-UA: NORMAL
MONOCYTES # BLD AUTO: 0.66 K/UL
MONOCYTES NFR BLD AUTO: 8.6 %
NEUTROPHILS # BLD AUTO: 4.75 K/UL
NEUTROPHILS NFR BLD AUTO: 62.2 %
NITRITE URINE: NEGATIVE
NONHDLC SERPL-MCNC: 79 MG/DL
PH URINE: 5.5
PLATELET # BLD AUTO: 214 K/UL
POTASSIUM SERPL-SCNC: 4.4 MMOL/L
PROT SERPL-MCNC: 7 G/DL
PROTEIN URINE: NEGATIVE
PSA FREE FLD-MCNC: 19 %
PSA FREE SERPL-MCNC: 1.13 NG/ML
PSA SERPL-MCNC: 5.95 NG/ML
RBC # BLD: 4.46 M/UL
RBC # FLD: 14.2 %
RED BLOOD CELLS URINE: 0 /HPF
SODIUM SERPL-SCNC: 140 MMOL/L
SPECIFIC GRAVITY URINE: 1.01
SQUAMOUS EPITHELIAL CELLS: 1 /HPF
TRIGL SERPL-MCNC: 86 MG/DL
TSH SERPL-ACNC: 0.87 UIU/ML
UROBILINOGEN URINE: NORMAL
WBC # FLD AUTO: 7.64 K/UL
WHITE BLOOD CELLS URINE: 2 /HPF

## 2021-08-02 PROCEDURE — 36415 COLL VENOUS BLD VENIPUNCTURE: CPT

## 2021-08-06 ENCOUNTER — NON-APPOINTMENT (OUTPATIENT)
Age: 76
End: 2021-08-06

## 2021-08-06 ENCOUNTER — APPOINTMENT (OUTPATIENT)
Dept: INTERNAL MEDICINE | Facility: CLINIC | Age: 76
End: 2021-08-06
Payer: MEDICARE

## 2021-08-06 VITALS
HEIGHT: 68 IN | WEIGHT: 184 LBS | TEMPERATURE: 98.4 F | OXYGEN SATURATION: 95 % | SYSTOLIC BLOOD PRESSURE: 147 MMHG | DIASTOLIC BLOOD PRESSURE: 63 MMHG | BODY MASS INDEX: 27.89 KG/M2 | HEART RATE: 79 BPM | RESPIRATION RATE: 15 BRPM

## 2021-08-06 PROCEDURE — G0439: CPT

## 2021-08-06 PROCEDURE — 93000 ELECTROCARDIOGRAM COMPLETE: CPT

## 2021-08-06 NOTE — HISTORY OF PRESENT ILLNESS
[de-identified] : PT COMES FOR CPE \par ONLY CC OF R KNEE TENDERNESS WHEN TAKING STAIRS ,FROM NO EFFUSION ,NO LIMITATION OF ACTIVITIES\par WILL SEE  NEXT MONTH\par UP TO DATE WITH OPHTHA \par

## 2021-08-06 NOTE — PHYSICAL EXAM

## 2021-08-06 NOTE — ASSESSMENT
[FreeTextEntry1] : CPE OF 75 Y OLD MALE WITH PMX OF HTN ,DYSLIPIDEMIA ,BPH ,GERD AND ELEVATED PSA= DECREASE ROSUVASTATIN FROM 20 TO 10 SINCE LDL IS BELLOW 70\par F/U  \par RTO IN 3 M

## 2021-10-28 ENCOUNTER — RX RENEWAL (OUTPATIENT)
Age: 76
End: 2021-10-28

## 2021-10-31 ENCOUNTER — RX RENEWAL (OUTPATIENT)
Age: 76
End: 2021-10-31

## 2021-11-05 ENCOUNTER — APPOINTMENT (OUTPATIENT)
Dept: HEART AND VASCULAR | Facility: CLINIC | Age: 76
End: 2021-11-05
Payer: MEDICARE

## 2021-11-05 PROCEDURE — 36415 COLL VENOUS BLD VENIPUNCTURE: CPT

## 2021-11-07 LAB
ALBUMIN SERPL ELPH-MCNC: 4.5 G/DL
ALP BLD-CCNC: 80 U/L
ALT SERPL-CCNC: 27 U/L
ANION GAP SERPL CALC-SCNC: 13 MMOL/L
AST SERPL-CCNC: 27 U/L
BILIRUB SERPL-MCNC: 0.7 MG/DL
BUN SERPL-MCNC: 23 MG/DL
CALCIUM SERPL-MCNC: 9.5 MG/DL
CHLORIDE SERPL-SCNC: 103 MMOL/L
CHOLEST SERPL-MCNC: 158 MG/DL
CO2 SERPL-SCNC: 23 MMOL/L
CREAT SERPL-MCNC: 1.13 MG/DL
ESTIMATED AVERAGE GLUCOSE: 137 MG/DL
GLUCOSE SERPL-MCNC: 98 MG/DL
HBA1C MFR BLD HPLC: 6.4 %
HDLC SERPL-MCNC: 51 MG/DL
LDLC SERPL CALC-MCNC: 91 MG/DL
NONHDLC SERPL-MCNC: 108 MG/DL
POTASSIUM SERPL-SCNC: 5 MMOL/L
PROT SERPL-MCNC: 7.5 G/DL
SODIUM SERPL-SCNC: 139 MMOL/L
TRIGL SERPL-MCNC: 81 MG/DL

## 2021-11-08 ENCOUNTER — APPOINTMENT (OUTPATIENT)
Dept: INTERNAL MEDICINE | Facility: CLINIC | Age: 76
End: 2021-11-08
Payer: MEDICARE

## 2021-11-08 VITALS
DIASTOLIC BLOOD PRESSURE: 63 MMHG | OXYGEN SATURATION: 96 % | BODY MASS INDEX: 27.43 KG/M2 | HEART RATE: 79 BPM | RESPIRATION RATE: 14 BRPM | WEIGHT: 181 LBS | TEMPERATURE: 98.6 F | HEIGHT: 68 IN | SYSTOLIC BLOOD PRESSURE: 134 MMHG

## 2021-11-08 DIAGNOSIS — B02.9 ZOSTER W/OUT COMPLICATIONS: ICD-10-CM

## 2021-11-08 PROCEDURE — 99214 OFFICE O/P EST MOD 30 MIN: CPT | Mod: 25

## 2021-11-08 PROCEDURE — G0008: CPT

## 2021-11-08 PROCEDURE — 90662 IIV NO PRSV INCREASED AG IM: CPT

## 2021-11-08 RX ORDER — ROSUVASTATIN CALCIUM 20 MG/1
20 TABLET, FILM COATED ORAL
Qty: 90 | Refills: 0 | Status: DISCONTINUED | COMMUNITY
Start: 2021-10-31 | End: 2021-11-08

## 2021-11-08 NOTE — HISTORY OF PRESENT ILLNESS
[de-identified] : PT COMES FOR F/U \par HAD HZ R FACIAL AREA 3 WEEKS AGO ,RX WITH VALTREX;MINOR PHN SX

## 2021-11-08 NOTE — ASSESSMENT
[FreeTextEntry1] : 76 Y OLD MALE WITH PMX OF HTN ,IFG ,DYSLIPIDEMIA AND GERD= LABS REVIEWED ,CONTINUE CURRENT MEDS\par S/P HZ 3 WEEKS AGO = RESOLVING SX,OBSERVE ;RECOMM SHINGRIX VACCINE 1/22\par INFLUENZA VACCINE TODAY \par MRNA COVID-19 BOOSTER IN 2 WEEKS \par RTO 3 M

## 2021-12-06 ENCOUNTER — APPOINTMENT (OUTPATIENT)
Dept: INTERNAL MEDICINE | Facility: CLINIC | Age: 76
End: 2021-12-06
Payer: MEDICARE

## 2021-12-06 VITALS
BODY MASS INDEX: 28.49 KG/M2 | HEART RATE: 75 BPM | HEIGHT: 68 IN | SYSTOLIC BLOOD PRESSURE: 136 MMHG | TEMPERATURE: 98.2 F | DIASTOLIC BLOOD PRESSURE: 77 MMHG | WEIGHT: 188 LBS | OXYGEN SATURATION: 97 % | RESPIRATION RATE: 15 BRPM

## 2021-12-06 DIAGNOSIS — B02.29 OTHER POSTHERPETIC NERVOUS SYSTEM INVOLVEMENT: ICD-10-CM

## 2021-12-06 PROCEDURE — 99214 OFFICE O/P EST MOD 30 MIN: CPT

## 2021-12-06 NOTE — HISTORY OF PRESENT ILLNESS
[de-identified] : PT COMES WITH CC OF INCREASING R SIDE OF THE NECK AND SHOULDER PHN SX \par CC OF RECURRENT NASAL D/C OF CLEAR LIKE WATER SECRETIONS \par NO FEVER NO COUGH NO OTHER SX

## 2021-12-06 NOTE — ASSESSMENT
[FreeTextEntry1] : 76 Y OLD MALE WITH R CERVICAL PHN = START GABAPENTIN 100 MG AT HS ,MAY INCREASE TO BID OR 2 00 MG AT HS IN 5 DAYS \par EDMOND= FLUTICASONE RX SENT

## 2021-12-06 NOTE — REVIEW OF SYSTEMS
[Nasal Discharge] : nasal discharge [Negative] : Heme/Lymph [de-identified] : R CERVICAL PHN SX GETTING WORSE

## 2022-01-01 ENCOUNTER — APPOINTMENT (OUTPATIENT)
Dept: HEART AND VASCULAR | Facility: CLINIC | Age: 77
End: 2022-01-01

## 2022-01-01 ENCOUNTER — LABORATORY RESULT (OUTPATIENT)
Age: 77
End: 2022-01-01

## 2022-01-01 ENCOUNTER — NON-APPOINTMENT (OUTPATIENT)
Age: 77
End: 2022-01-01

## 2022-01-01 ENCOUNTER — APPOINTMENT (OUTPATIENT)
Dept: INTERNAL MEDICINE | Facility: CLINIC | Age: 77
End: 2022-01-01

## 2022-01-01 VITALS
DIASTOLIC BLOOD PRESSURE: 69 MMHG | SYSTOLIC BLOOD PRESSURE: 139 MMHG | HEART RATE: 91 BPM | TEMPERATURE: 98.2 F | HEIGHT: 68 IN | BODY MASS INDEX: 28.34 KG/M2 | OXYGEN SATURATION: 97 % | RESPIRATION RATE: 14 BRPM | WEIGHT: 187 LBS

## 2022-01-01 DIAGNOSIS — Z00.00 ENCOUNTER FOR GENERAL ADULT MEDICAL EXAMINATION W/OUT ABNORMAL FINDINGS: ICD-10-CM

## 2022-01-01 DIAGNOSIS — K21.9 GASTRO-ESOPHAGEAL REFLUX DISEASE W/OUT ESOPHAGITIS: ICD-10-CM

## 2022-01-01 LAB
25(OH)D3 SERPL-MCNC: 29 NG/ML
ALBUMIN SERPL ELPH-MCNC: 4.6 G/DL
ALP BLD-CCNC: 82 U/L
ALT SERPL-CCNC: 32 U/L
ANION GAP SERPL CALC-SCNC: 14 MMOL/L
APPEARANCE: CLEAR
AST SERPL-CCNC: 26 U/L
BASOPHILS # BLD AUTO: 0.09 K/UL
BASOPHILS NFR BLD AUTO: 1.1 %
BILIRUB SERPL-MCNC: 0.6 MG/DL
BILIRUBIN URINE: NEGATIVE
BLOOD URINE: NEGATIVE
BUN SERPL-MCNC: 27 MG/DL
CALCIUM SERPL-MCNC: 9.4 MG/DL
CHLORIDE SERPL-SCNC: 106 MMOL/L
CHOLEST SERPL-MCNC: 149 MG/DL
CO2 SERPL-SCNC: 18 MMOL/L
COLOR: NORMAL
CREAT SERPL-MCNC: 1.25 MG/DL
CREAT SPEC-SCNC: 75 MG/DL
EGFR: 59 ML/MIN/1.73M2
EOSINOPHIL # BLD AUTO: 1.19 K/UL
EOSINOPHIL NFR BLD AUTO: 13.9 %
ESTIMATED AVERAGE GLUCOSE: 148 MG/DL
GLUCOSE QUALITATIVE U: NEGATIVE
GLUCOSE SERPL-MCNC: 107 MG/DL
HBA1C MFR BLD HPLC: 6.8 %
HCT VFR BLD CALC: 44.4 %
HDLC SERPL-MCNC: 46 MG/DL
HGB BLD-MCNC: 14.2 G/DL
IMM GRANULOCYTES NFR BLD AUTO: 0.2 %
KETONES URINE: NEGATIVE
LDLC SERPL CALC-MCNC: 85 MG/DL
LEUKOCYTE ESTERASE URINE: ABNORMAL
LYMPHOCYTES # BLD AUTO: 1.61 K/UL
LYMPHOCYTES NFR BLD AUTO: 18.8 %
MAN DIFF?: NORMAL
MCHC RBC-ENTMCNC: 28.5 PG
MCHC RBC-ENTMCNC: 32 GM/DL
MCV RBC AUTO: 89 FL
MICROALBUMIN 24H UR DL<=1MG/L-MCNC: <1.2 MG/DL
MICROALBUMIN/CREAT 24H UR-RTO: NORMAL MG/G
MONOCYTES # BLD AUTO: 0.6 K/UL
MONOCYTES NFR BLD AUTO: 7 %
NEUTROPHILS # BLD AUTO: 5.04 K/UL
NEUTROPHILS NFR BLD AUTO: 59 %
NITRITE URINE: NEGATIVE
NONHDLC SERPL-MCNC: 103 MG/DL
PH URINE: 5.5
PLATELET # BLD AUTO: 237 K/UL
POTASSIUM SERPL-SCNC: 5 MMOL/L
PROT SERPL-MCNC: 7.7 G/DL
PROTEIN URINE: NEGATIVE
PSA FREE FLD-MCNC: 19 %
PSA FREE SERPL-MCNC: 1.36 NG/ML
PSA SERPL-MCNC: 7.03 NG/ML
RBC # BLD: 4.99 M/UL
RBC # FLD: 14.4 %
SODIUM SERPL-SCNC: 138 MMOL/L
SPECIFIC GRAVITY URINE: 1.01
TRIGL SERPL-MCNC: 93 MG/DL
TSH SERPL-ACNC: 1.01 UIU/ML
UROBILINOGEN URINE: NORMAL
WBC # FLD AUTO: 8.55 K/UL

## 2022-01-01 PROCEDURE — 93000 ELECTROCARDIOGRAM COMPLETE: CPT

## 2022-01-01 PROCEDURE — G0439: CPT

## 2022-01-01 PROCEDURE — 36415 COLL VENOUS BLD VENIPUNCTURE: CPT

## 2022-02-10 ENCOUNTER — APPOINTMENT (OUTPATIENT)
Dept: HEART AND VASCULAR | Facility: CLINIC | Age: 77
End: 2022-02-10
Payer: MEDICARE

## 2022-02-10 PROCEDURE — 36415 COLL VENOUS BLD VENIPUNCTURE: CPT

## 2022-02-11 LAB
ALBUMIN SERPL ELPH-MCNC: 4.6 G/DL
ALP BLD-CCNC: 95 U/L
ALT SERPL-CCNC: 34 U/L
ANION GAP SERPL CALC-SCNC: 14 MMOL/L
AST SERPL-CCNC: 29 U/L
BILIRUB SERPL-MCNC: 0.6 MG/DL
BUN SERPL-MCNC: 25 MG/DL
CALCIUM SERPL-MCNC: 9.4 MG/DL
CHLORIDE SERPL-SCNC: 106 MMOL/L
CHOLEST SERPL-MCNC: 148 MG/DL
CO2 SERPL-SCNC: 23 MMOL/L
CREAT SERPL-MCNC: 1.4 MG/DL
ESTIMATED AVERAGE GLUCOSE: 148 MG/DL
GLUCOSE SERPL-MCNC: 92 MG/DL
HBA1C MFR BLD HPLC: 6.8 %
HDLC SERPL-MCNC: 41 MG/DL
LDLC SERPL CALC-MCNC: 87 MG/DL
NONHDLC SERPL-MCNC: 107 MG/DL
POTASSIUM SERPL-SCNC: 5.1 MMOL/L
PROT SERPL-MCNC: 7.8 G/DL
SODIUM SERPL-SCNC: 142 MMOL/L
TRIGL SERPL-MCNC: 103 MG/DL

## 2022-02-14 ENCOUNTER — APPOINTMENT (OUTPATIENT)
Dept: INTERNAL MEDICINE | Facility: CLINIC | Age: 77
End: 2022-02-14
Payer: MEDICARE

## 2022-02-14 VITALS
HEIGHT: 68 IN | WEIGHT: 185 LBS | BODY MASS INDEX: 28.04 KG/M2 | HEART RATE: 92 BPM | OXYGEN SATURATION: 94 % | DIASTOLIC BLOOD PRESSURE: 68 MMHG | RESPIRATION RATE: 14 BRPM | SYSTOLIC BLOOD PRESSURE: 143 MMHG | TEMPERATURE: 98.4 F

## 2022-02-14 PROCEDURE — 99214 OFFICE O/P EST MOD 30 MIN: CPT

## 2022-02-14 RX ORDER — GABAPENTIN 100 MG/1
100 CAPSULE ORAL AT BEDTIME
Qty: 60 | Refills: 1 | Status: DISCONTINUED | COMMUNITY
Start: 2021-12-06 | End: 2022-02-14

## 2022-02-14 RX ORDER — OLOPATADINE HCL 1 MG/ML
0.1 SOLUTION/ DROPS OPHTHALMIC TWICE DAILY
Qty: 1 | Refills: 3 | Status: DISCONTINUED | COMMUNITY
Start: 2020-05-04 | End: 2022-02-14

## 2022-02-14 NOTE — REVIEW OF SYSTEMS
[Joint Pain] : joint pain [Muscle Pain] : muscle pain [Negative] : Heme/Lymph [de-identified] : SEE HPI

## 2022-02-14 NOTE — HISTORY OF PRESENT ILLNESS
[de-identified] : CC OF RUE ARTHRALGIA - MUSCLE ACHES ON /OFF FOR LAST 2 M ,BETTER WITH ROM EXERCISES AND WT LIFTING\par SPORADIC R ANTERIOR NECK HYPERSTHESIA

## 2022-02-14 NOTE — ASSESSMENT
[FreeTextEntry1] : 76 Y OLD MALE WITH PMX OF HTN = STABLE \par DYSLIPIDEMIA = STABLE ON CRESTOR\par HBA1C INCREASED TO 6.8 INTO EARLY DM RANGE= RESUME LOW CARB DIET THAT PT HAD STOP \par RTO 3 M WITH LABS

## 2022-02-14 NOTE — PHYSICAL EXAM

## 2022-03-04 ENCOUNTER — APPOINTMENT (OUTPATIENT)
Dept: INTERNAL MEDICINE | Facility: CLINIC | Age: 77
End: 2022-03-04
Payer: MEDICARE

## 2022-03-04 VITALS
HEIGHT: 68 IN | WEIGHT: 189 LBS | TEMPERATURE: 97.8 F | OXYGEN SATURATION: 95 % | DIASTOLIC BLOOD PRESSURE: 65 MMHG | BODY MASS INDEX: 28.64 KG/M2 | SYSTOLIC BLOOD PRESSURE: 154 MMHG | HEART RATE: 84 BPM

## 2022-03-04 PROCEDURE — 99214 OFFICE O/P EST MOD 30 MIN: CPT

## 2022-03-04 RX ORDER — ALBUTEROL SULFATE 90 UG/1
108 (90 BASE) INHALANT RESPIRATORY (INHALATION) EVERY 6 HOURS
Qty: 1 | Refills: 3 | Status: DISCONTINUED | COMMUNITY
Start: 2019-09-23 | End: 2022-03-04

## 2022-03-04 RX ORDER — MONTELUKAST 10 MG/1
10 TABLET, FILM COATED ORAL DAILY
Qty: 15 | Refills: 0 | Status: ACTIVE | COMMUNITY
Start: 2022-03-04 | End: 1900-01-01

## 2022-03-04 NOTE — HISTORY OF PRESENT ILLNESS
[FreeTextEntry8] : CC OF COUGH FOR 1 WEEK ,MOSTLY DRY AND WHILE IN BED ;HAD TO SLEEP ON RECLINER A FEW TIMES \par NO FEVER OR CHILLS \par USED ALBUTEROL INH WITH MILD HELP

## 2022-03-04 NOTE — PHYSICAL EXAM
[Scattered Wheezes] : scattered wheezing was heard [Normal] : affect was normal and insight and judgment were intact

## 2022-03-04 NOTE — ASSESSMENT
[FreeTextEntry1] : ACUTE VISIT OF 76 Y OLD MALE WHO PRESENTS WITH ACUTE EXACERBATION OF ASTHMA= VENTOLIN IHN ,PREDNISONE 20 MG DAILY FOR 5 DAYS AND MONTELUKAST SENT \par RTO PRN

## 2022-04-30 ENCOUNTER — RX RENEWAL (OUTPATIENT)
Age: 77
End: 2022-04-30

## 2022-05-13 ENCOUNTER — LABORATORY RESULT (OUTPATIENT)
Age: 77
End: 2022-05-13

## 2022-05-13 ENCOUNTER — APPOINTMENT (OUTPATIENT)
Dept: HEART AND VASCULAR | Facility: CLINIC | Age: 77
End: 2022-05-13
Payer: MEDICARE

## 2022-05-13 PROCEDURE — 36415 COLL VENOUS BLD VENIPUNCTURE: CPT

## 2022-05-16 ENCOUNTER — APPOINTMENT (OUTPATIENT)
Dept: INTERNAL MEDICINE | Facility: CLINIC | Age: 77
End: 2022-05-16
Payer: MEDICARE

## 2022-05-16 VITALS — SYSTOLIC BLOOD PRESSURE: 132 MMHG | DIASTOLIC BLOOD PRESSURE: 70 MMHG

## 2022-05-16 VITALS
RESPIRATION RATE: 14 BRPM | SYSTOLIC BLOOD PRESSURE: 130 MMHG | HEIGHT: 68 IN | BODY MASS INDEX: 28.04 KG/M2 | DIASTOLIC BLOOD PRESSURE: 71 MMHG | OXYGEN SATURATION: 95 % | WEIGHT: 185 LBS | HEART RATE: 79 BPM

## 2022-05-16 DIAGNOSIS — J30.2 OTHER SEASONAL ALLERGIC RHINITIS: ICD-10-CM

## 2022-05-16 DIAGNOSIS — R73.01 IMPAIRED FASTING GLUCOSE: ICD-10-CM

## 2022-05-16 DIAGNOSIS — L30.8 OTHER SPECIFIED DERMATITIS: ICD-10-CM

## 2022-05-16 PROCEDURE — 99214 OFFICE O/P EST MOD 30 MIN: CPT

## 2022-05-16 RX ORDER — PREDNISONE 20 MG/1
20 TABLET ORAL
Qty: 5 | Refills: 0 | Status: DISCONTINUED | COMMUNITY
Start: 2022-03-04 | End: 2022-05-16

## 2022-05-16 RX ORDER — HYDROCORTISONE 25 MG/G
2.5 CREAM TOPICAL
Qty: 1 | Refills: 0 | Status: ACTIVE | COMMUNITY
Start: 2022-05-16 | End: 1900-01-01

## 2022-05-16 NOTE — HISTORY OF PRESENT ILLNESS
[de-identified] : COMES FOR F/U HTN ,DM ,AND DYSLIPIDEMIA \par EDMOND UNDER CONTROL ,VENTOLIN WAS NO COVERED \par CC OF ABDOUL DRAKE FOR 1 M

## 2022-05-16 NOTE — PHYSICAL EXAM
[Coordination Grossly Intact] : coordination grossly intact [No Focal Deficits] : no focal deficits [Normal Gait] : normal gait [Normal] : affect was normal and insight and judgment were intact [de-identified] : EXCORIATED RASH ON R PALM  CONSISTENT WITH ECZEMA

## 2022-05-16 NOTE — ASSESSMENT
[FreeTextEntry1] : 76 Y OLD MALE WITH PMX OF HTN ,DYSLIPIDEMIA ,DM ON DIET ,BPH AND GERD= CONTINUE CURRENT MEDS;F/U  IN AM \par ECZEMA R PALM = HC CREAM 2.5 % SENT \par EDMOND AND ASTHMA = STABLE ,ALBUTEROL INH RX

## 2022-08-15 PROBLEM — K21.9 CHRONIC GERD: Status: ACTIVE | Noted: 2018-09-14

## 2022-08-15 NOTE — HISTORY OF PRESENT ILLNESS
[de-identified] : COMES FOR CPE \par WILL SEE  NEXT MONTHS \par EXERCISES DAILY\par HAD 3ER COVID-19 VACCINE MONTHS AGO

## 2022-08-15 NOTE — PHYSICAL EXAM
[No CVA Tenderness] : no CVA  tenderness [No Rash] : no rash [Coordination Grossly Intact] : coordination grossly intact [No Focal Deficits] : no focal deficits [Normal] : affect was normal and insight and judgment were intact

## 2022-08-15 NOTE — ASSESSMENT
[FreeTextEntry1] : CPE OF 76 Y OLD MALE WITH PMX OF HTN ,DYSLIPIDEMIA AND DM = LABS AND EKG REVIEWED\par BPH AND INCREASING PSA= FOR  F/U \par OPHTHA EVAL SOON \par RECOMM 4TH COVID-19 VACCINE \par RTO 3 M

## 2023-01-01 ENCOUNTER — APPOINTMENT (OUTPATIENT)
Dept: INTERNAL MEDICINE | Facility: CLINIC | Age: 78
End: 2023-01-01

## 2023-01-01 ENCOUNTER — APPOINTMENT (OUTPATIENT)
Dept: HEART AND VASCULAR | Facility: CLINIC | Age: 78
End: 2023-01-01

## 2023-01-01 ENCOUNTER — TRANSCRIPTION ENCOUNTER (OUTPATIENT)
Age: 78
End: 2023-01-01

## 2023-01-01 ENCOUNTER — APPOINTMENT (OUTPATIENT)
Dept: HEART AND VASCULAR | Facility: CLINIC | Age: 78
End: 2023-01-01
Payer: MEDICARE

## 2023-01-01 ENCOUNTER — INPATIENT (INPATIENT)
Facility: HOSPITAL | Age: 78
LOS: 9 days | DRG: 853 | End: 2023-06-27
Attending: INTERNAL MEDICINE | Admitting: INTERNAL MEDICINE
Payer: COMMERCIAL

## 2023-01-01 ENCOUNTER — RESULT REVIEW (OUTPATIENT)
Age: 78
End: 2023-01-01

## 2023-01-01 ENCOUNTER — APPOINTMENT (OUTPATIENT)
Dept: INTERNAL MEDICINE | Facility: CLINIC | Age: 78
End: 2023-01-01
Payer: MEDICARE

## 2023-01-01 VITALS
RESPIRATION RATE: 19 BRPM | TEMPERATURE: 98 F | HEART RATE: 79 BPM | SYSTOLIC BLOOD PRESSURE: 105 MMHG | DIASTOLIC BLOOD PRESSURE: 69 MMHG | OXYGEN SATURATION: 98 % | WEIGHT: 178.57 LBS | HEIGHT: 66.93 IN

## 2023-01-01 VITALS
OXYGEN SATURATION: 95 % | WEIGHT: 178 LBS | DIASTOLIC BLOOD PRESSURE: 75 MMHG | TEMPERATURE: 98.3 F | HEIGHT: 68 IN | RESPIRATION RATE: 14 BRPM | HEART RATE: 98 BPM | BODY MASS INDEX: 26.98 KG/M2 | SYSTOLIC BLOOD PRESSURE: 142 MMHG

## 2023-01-01 VITALS — RESPIRATION RATE: 22 BRPM | TEMPERATURE: 96 F

## 2023-01-01 DIAGNOSIS — E78.5 HYPERLIPIDEMIA, UNSPECIFIED: ICD-10-CM

## 2023-01-01 DIAGNOSIS — I10 ESSENTIAL (PRIMARY) HYPERTENSION: ICD-10-CM

## 2023-01-01 DIAGNOSIS — R97.20 ELEVATED PROSTATE, SPECIFIC ANTIGEN [PSA]: ICD-10-CM

## 2023-01-01 DIAGNOSIS — K56.609 UNSPECIFIED INTESTINAL OBSTRUCTION, UNSPECIFIED AS TO PARTIAL VERSUS COMPLETE OBSTRUCTION: ICD-10-CM

## 2023-01-01 DIAGNOSIS — N40.0 BENIGN PROSTATIC HYPERPLASIA WITHOUT LOWER URINARY TRACT SYMPMS: ICD-10-CM

## 2023-01-01 DIAGNOSIS — J45.909 UNSPECIFIED ASTHMA, UNCOMPLICATED: ICD-10-CM

## 2023-01-01 DIAGNOSIS — J45.901 UNSPECIFIED ASTHMA WITH (ACUTE) EXACERBATION: ICD-10-CM

## 2023-01-01 DIAGNOSIS — Z79.4 TYPE 2 DIABETES MELLITUS W/OUT COMPLICATIONS: ICD-10-CM

## 2023-01-01 DIAGNOSIS — E11.9 TYPE 2 DIABETES MELLITUS W/OUT COMPLICATIONS: ICD-10-CM

## 2023-01-01 LAB
-  AMPICILLIN: SIGNIFICANT CHANGE UP
-  CIPROFLOXACIN: SIGNIFICANT CHANGE UP
-  LEVOFLOXACIN: SIGNIFICANT CHANGE UP
-  NITROFURANTOIN: SIGNIFICANT CHANGE UP
-  TETRACYCLINE: SIGNIFICANT CHANGE UP
-  VANCOMYCIN: SIGNIFICANT CHANGE UP
ALBUMIN SERPL ELPH-MCNC: 1 G/DL — LOW (ref 3.5–5)
ALBUMIN SERPL ELPH-MCNC: 1.1 G/DL — LOW (ref 3.5–5)
ALBUMIN SERPL ELPH-MCNC: 1.4 G/DL — LOW (ref 3.5–5)
ALBUMIN SERPL ELPH-MCNC: 1.6 G/DL — LOW (ref 3.5–5)
ALBUMIN SERPL ELPH-MCNC: 1.7 G/DL — LOW (ref 3.5–5)
ALBUMIN SERPL ELPH-MCNC: 1.8 G/DL — LOW (ref 3.5–5)
ALBUMIN SERPL ELPH-MCNC: 1.8 G/DL — LOW (ref 3.5–5)
ALBUMIN SERPL ELPH-MCNC: 1.9 G/DL — LOW (ref 3.5–5)
ALBUMIN SERPL ELPH-MCNC: 2 G/DL — LOW (ref 3.5–5)
ALBUMIN SERPL ELPH-MCNC: 2.2 G/DL — LOW (ref 3.5–5)
ALBUMIN SERPL ELPH-MCNC: 2.2 G/DL — LOW (ref 3.5–5)
ALBUMIN SERPL ELPH-MCNC: 2.4 G/DL — LOW (ref 3.5–5)
ALBUMIN SERPL ELPH-MCNC: 2.7 G/DL — LOW (ref 3.5–5)
ALBUMIN SERPL ELPH-MCNC: 2.7 G/DL — LOW (ref 3.5–5)
ALBUMIN SERPL ELPH-MCNC: 4.2 G/DL
ALBUMIN SERPL ELPH-MCNC: 4.2 G/DL — SIGNIFICANT CHANGE UP (ref 3.5–5)
ALP BLD-CCNC: 82 U/L
ALP SERPL-CCNC: 103 U/L — SIGNIFICANT CHANGE UP (ref 40–120)
ALP SERPL-CCNC: 148 U/L — HIGH (ref 40–120)
ALP SERPL-CCNC: 259 U/L — HIGH (ref 40–120)
ALP SERPL-CCNC: 41 U/L — SIGNIFICANT CHANGE UP (ref 40–120)
ALP SERPL-CCNC: 41 U/L — SIGNIFICANT CHANGE UP (ref 40–120)
ALP SERPL-CCNC: 43 U/L — SIGNIFICANT CHANGE UP (ref 40–120)
ALP SERPL-CCNC: 46 U/L — SIGNIFICANT CHANGE UP (ref 40–120)
ALP SERPL-CCNC: 51 U/L — SIGNIFICANT CHANGE UP (ref 40–120)
ALP SERPL-CCNC: 53 U/L — SIGNIFICANT CHANGE UP (ref 40–120)
ALP SERPL-CCNC: 60 U/L — SIGNIFICANT CHANGE UP (ref 40–120)
ALP SERPL-CCNC: 64 U/L — SIGNIFICANT CHANGE UP (ref 40–120)
ALP SERPL-CCNC: 67 U/L — SIGNIFICANT CHANGE UP (ref 40–120)
ALP SERPL-CCNC: 68 U/L — SIGNIFICANT CHANGE UP (ref 40–120)
ALP SERPL-CCNC: 69 U/L — SIGNIFICANT CHANGE UP (ref 40–120)
ALP SERPL-CCNC: 69 U/L — SIGNIFICANT CHANGE UP (ref 40–120)
ALP SERPL-CCNC: 71 U/L — SIGNIFICANT CHANGE UP (ref 40–120)
ALP SERPL-CCNC: 74 U/L — SIGNIFICANT CHANGE UP (ref 40–120)
ALP SERPL-CCNC: 74 U/L — SIGNIFICANT CHANGE UP (ref 40–120)
ALP SERPL-CCNC: 78 U/L — SIGNIFICANT CHANGE UP (ref 40–120)
ALP SERPL-CCNC: 79 U/L — SIGNIFICANT CHANGE UP (ref 40–120)
ALP SERPL-CCNC: 88 U/L — SIGNIFICANT CHANGE UP (ref 40–120)
ALP SERPL-CCNC: 88 U/L — SIGNIFICANT CHANGE UP (ref 40–120)
ALT FLD-CCNC: 23 U/L DA — SIGNIFICANT CHANGE UP (ref 10–60)
ALT FLD-CCNC: 23 U/L DA — SIGNIFICANT CHANGE UP (ref 10–60)
ALT FLD-CCNC: 24 U/L DA — SIGNIFICANT CHANGE UP (ref 10–60)
ALT FLD-CCNC: 24 U/L DA — SIGNIFICANT CHANGE UP (ref 10–60)
ALT FLD-CCNC: 26 U/L DA — SIGNIFICANT CHANGE UP (ref 10–60)
ALT FLD-CCNC: 28 U/L DA — SIGNIFICANT CHANGE UP (ref 10–60)
ALT FLD-CCNC: 3456 U/L DA — HIGH (ref 10–60)
ALT FLD-CCNC: 37 U/L DA — SIGNIFICANT CHANGE UP (ref 10–60)
ALT FLD-CCNC: 40 U/L DA — SIGNIFICANT CHANGE UP (ref 10–60)
ALT FLD-CCNC: 46 U/L DA — SIGNIFICANT CHANGE UP (ref 10–60)
ALT FLD-CCNC: 4732 U/L DA — HIGH (ref 10–60)
ALT FLD-CCNC: 52 U/L DA — SIGNIFICANT CHANGE UP (ref 10–60)
ALT FLD-CCNC: 59 U/L DA — SIGNIFICANT CHANGE UP (ref 10–60)
ALT FLD-CCNC: 60 U/L DA — SIGNIFICANT CHANGE UP (ref 10–60)
ALT FLD-CCNC: 61 U/L DA — HIGH (ref 10–60)
ALT FLD-CCNC: 62 U/L DA — HIGH (ref 10–60)
ALT FLD-CCNC: 63 U/L DA — HIGH (ref 10–60)
ALT FLD-CCNC: 67 U/L DA — HIGH (ref 10–60)
ALT FLD-CCNC: 68 U/L DA — HIGH (ref 10–60)
ALT FLD-CCNC: 70 U/L DA — HIGH (ref 10–60)
ALT FLD-CCNC: 70 U/L DA — HIGH (ref 10–60)
ALT FLD-CCNC: 79 U/L DA — HIGH (ref 10–60)
ALT SERPL-CCNC: 28 U/L
AMMONIA BLD-MCNC: 41 UMOL/L — HIGH (ref 11–32)
ANION GAP SERPL CALC-SCNC: 10 MMOL/L — SIGNIFICANT CHANGE UP (ref 5–17)
ANION GAP SERPL CALC-SCNC: 10 MMOL/L — SIGNIFICANT CHANGE UP (ref 5–17)
ANION GAP SERPL CALC-SCNC: 11 MMOL/L — SIGNIFICANT CHANGE UP (ref 5–17)
ANION GAP SERPL CALC-SCNC: 12 MMOL/L — SIGNIFICANT CHANGE UP (ref 5–17)
ANION GAP SERPL CALC-SCNC: 13 MMOL/L — SIGNIFICANT CHANGE UP (ref 5–17)
ANION GAP SERPL CALC-SCNC: 14 MMOL/L
ANION GAP SERPL CALC-SCNC: 14 MMOL/L — SIGNIFICANT CHANGE UP (ref 5–17)
ANION GAP SERPL CALC-SCNC: 15 MMOL/L — SIGNIFICANT CHANGE UP (ref 5–17)
ANION GAP SERPL CALC-SCNC: 16 MMOL/L — SIGNIFICANT CHANGE UP (ref 5–17)
ANION GAP SERPL CALC-SCNC: 19 MMOL/L — HIGH (ref 5–17)
ANION GAP SERPL CALC-SCNC: 22 MMOL/L — HIGH (ref 5–17)
ANION GAP SERPL CALC-SCNC: 27 MMOL/L — HIGH (ref 5–17)
ANION GAP SERPL CALC-SCNC: 28 MMOL/L — HIGH (ref 5–17)
ANION GAP SERPL CALC-SCNC: 29 MMOL/L — HIGH (ref 5–17)
ANION GAP SERPL CALC-SCNC: 30 MMOL/L — HIGH (ref 5–17)
ANION GAP SERPL CALC-SCNC: 7 MMOL/L — SIGNIFICANT CHANGE UP (ref 5–17)
ANION GAP SERPL CALC-SCNC: 8 MMOL/L — SIGNIFICANT CHANGE UP (ref 5–17)
ANION GAP SERPL CALC-SCNC: 9 MMOL/L — SIGNIFICANT CHANGE UP (ref 5–17)
ANION GAP SERPL CALC-SCNC: 9 MMOL/L — SIGNIFICANT CHANGE UP (ref 5–17)
ANISOCYTOSIS BLD QL: SLIGHT — SIGNIFICANT CHANGE UP
APPEARANCE UR: CLEAR — SIGNIFICANT CHANGE UP
APTT BLD: 123.3 SEC — CRITICAL HIGH (ref 27.5–35.5)
APTT BLD: 133.4 SEC — CRITICAL HIGH (ref 27.5–35.5)
APTT BLD: 28.5 SEC — SIGNIFICANT CHANGE UP (ref 27.5–35.5)
APTT BLD: 29.7 SEC — SIGNIFICANT CHANGE UP (ref 27.5–35.5)
APTT BLD: 30 SEC — SIGNIFICANT CHANGE UP (ref 27.5–35.5)
APTT BLD: 30.1 SEC — SIGNIFICANT CHANGE UP (ref 27.5–35.5)
APTT BLD: 31.4 SEC — SIGNIFICANT CHANGE UP (ref 27.5–35.5)
APTT BLD: 37 SEC — HIGH (ref 27.5–35.5)
APTT BLD: 40.3 SEC — HIGH (ref 27.5–35.5)
APTT BLD: 45.9 SEC — HIGH (ref 27.5–35.5)
APTT BLD: 48.1 SEC — HIGH (ref 27.5–35.5)
APTT BLD: 49.9 SEC — HIGH (ref 27.5–35.5)
APTT BLD: 51.7 SEC — HIGH (ref 27.5–35.5)
APTT BLD: 58.1 SEC — HIGH (ref 27.5–35.5)
APTT BLD: 61.8 SEC — HIGH (ref 27.5–35.5)
APTT BLD: 65 SEC — HIGH (ref 27.5–35.5)
APTT BLD: >200 SEC — CRITICAL HIGH (ref 27.5–35.5)
AST SERPL-CCNC: 102 U/L — HIGH (ref 10–40)
AST SERPL-CCNC: 102 U/L — HIGH (ref 10–40)
AST SERPL-CCNC: 109 U/L — HIGH (ref 10–40)
AST SERPL-CCNC: 172 U/L — HIGH (ref 10–40)
AST SERPL-CCNC: 23 U/L
AST SERPL-CCNC: 23 U/L — SIGNIFICANT CHANGE UP (ref 10–40)
AST SERPL-CCNC: 23 U/L — SIGNIFICANT CHANGE UP (ref 10–40)
AST SERPL-CCNC: 25 U/L — SIGNIFICANT CHANGE UP (ref 10–40)
AST SERPL-CCNC: 26 U/L — SIGNIFICANT CHANGE UP (ref 10–40)
AST SERPL-CCNC: 26 U/L — SIGNIFICANT CHANGE UP (ref 10–40)
AST SERPL-CCNC: 30 U/L — SIGNIFICANT CHANGE UP (ref 10–40)
AST SERPL-CCNC: 36 U/L — SIGNIFICANT CHANGE UP (ref 10–40)
AST SERPL-CCNC: 37 U/L — SIGNIFICANT CHANGE UP (ref 10–40)
AST SERPL-CCNC: 52 U/L — HIGH (ref 10–40)
AST SERPL-CCNC: 65 U/L — HIGH (ref 10–40)
AST SERPL-CCNC: 67 U/L — HIGH (ref 10–40)
AST SERPL-CCNC: 72 U/L — HIGH (ref 10–40)
AST SERPL-CCNC: 74 U/L — HIGH (ref 10–40)
AST SERPL-CCNC: 84 U/L — HIGH (ref 10–40)
AST SERPL-CCNC: 85 U/L — HIGH (ref 10–40)
AST SERPL-CCNC: 89 U/L — HIGH (ref 10–40)
AST SERPL-CCNC: >2000 U/L — HIGH (ref 10–40)
AST SERPL-CCNC: SIGNIFICANT CHANGE UP U/L (ref 10–40)
BASE EXCESS BLDA CALC-SCNC: -1.2 MMOL/L — SIGNIFICANT CHANGE UP (ref -2–3)
BASE EXCESS BLDA CALC-SCNC: -1.4 MMOL/L — SIGNIFICANT CHANGE UP (ref -2–3)
BASE EXCESS BLDA CALC-SCNC: -13.2 MMOL/L — LOW (ref -2–3)
BASE EXCESS BLDA CALC-SCNC: -14.2 MMOL/L — LOW (ref -2–3)
BASE EXCESS BLDA CALC-SCNC: -16.6 MMOL/L — LOW (ref -2–3)
BASE EXCESS BLDA CALC-SCNC: -18.1 MMOL/L — LOW (ref -2–3)
BASE EXCESS BLDA CALC-SCNC: -2.1 MMOL/L — LOW (ref -2–3)
BASE EXCESS BLDA CALC-SCNC: -2.5 MMOL/L — LOW (ref -2–3)
BASE EXCESS BLDA CALC-SCNC: -3.2 MMOL/L — LOW (ref -2–3)
BASE EXCESS BLDA CALC-SCNC: -3.5 MMOL/L — LOW (ref -2–3)
BASE EXCESS BLDA CALC-SCNC: -4.8 MMOL/L — LOW (ref -2–3)
BASE EXCESS BLDA CALC-SCNC: -6 MMOL/L — LOW (ref -2–3)
BASE EXCESS BLDA CALC-SCNC: -6.6 MMOL/L — LOW (ref -2–3)
BASE EXCESS BLDA CALC-SCNC: -7.1 MMOL/L — LOW (ref -2–3)
BASE EXCESS BLDA CALC-SCNC: -7.1 MMOL/L — LOW (ref -2–3)
BASE EXCESS BLDA CALC-SCNC: -9 MMOL/L — LOW (ref -2–3)
BASE EXCESS BLDA CALC-SCNC: -9.2 MMOL/L — LOW (ref -2–3)
BASE EXCESS BLDA CALC-SCNC: -9.9 MMOL/L — LOW (ref -2–3)
BASE EXCESS BLDA CALC-SCNC: 0 MMOL/L — SIGNIFICANT CHANGE UP (ref -2–3)
BASE EXCESS BLDV CALC-SCNC: -0.8 MMOL/L — SIGNIFICANT CHANGE UP
BASE EXCESS BLDV CALC-SCNC: -15.6 MMOL/L — SIGNIFICANT CHANGE UP
BASE EXCESS BLDV CALC-SCNC: -3.8 MMOL/L — SIGNIFICANT CHANGE UP
BASE EXCESS BLDV CALC-SCNC: -8.7 MMOL/L — SIGNIFICANT CHANGE UP
BASE EXCESS BLDV CALC-SCNC: -8.9 MMOL/L — SIGNIFICANT CHANGE UP
BASOPHILS # BLD AUTO: 0 K/UL — SIGNIFICANT CHANGE UP (ref 0–0.2)
BASOPHILS # BLD AUTO: 0.01 K/UL — SIGNIFICANT CHANGE UP (ref 0–0.2)
BASOPHILS # BLD AUTO: 0.01 K/UL — SIGNIFICANT CHANGE UP (ref 0–0.2)
BASOPHILS # BLD AUTO: 0.02 K/UL — SIGNIFICANT CHANGE UP (ref 0–0.2)
BASOPHILS # BLD AUTO: 0.04 K/UL — SIGNIFICANT CHANGE UP (ref 0–0.2)
BASOPHILS # BLD AUTO: 0.05 K/UL — SIGNIFICANT CHANGE UP (ref 0–0.2)
BASOPHILS # BLD AUTO: 0.1 K/UL — SIGNIFICANT CHANGE UP (ref 0–0.2)
BASOPHILS # BLD AUTO: 0.1 K/UL — SIGNIFICANT CHANGE UP (ref 0–0.2)
BASOPHILS NFR BLD AUTO: 0 % — SIGNIFICANT CHANGE UP (ref 0–2)
BASOPHILS NFR BLD AUTO: 0.1 % — SIGNIFICANT CHANGE UP (ref 0–2)
BASOPHILS NFR BLD AUTO: 0.4 % — SIGNIFICANT CHANGE UP (ref 0–2)
BASOPHILS NFR BLD AUTO: 0.5 % — SIGNIFICANT CHANGE UP (ref 0–2)
BASOPHILS NFR BLD AUTO: 0.6 % — SIGNIFICANT CHANGE UP (ref 0–2)
BASOPHILS NFR BLD AUTO: 0.6 % — SIGNIFICANT CHANGE UP (ref 0–2)
BILIRUB SERPL-MCNC: 0.5 MG/DL
BILIRUB SERPL-MCNC: 0.6 MG/DL — SIGNIFICANT CHANGE UP (ref 0.2–1.2)
BILIRUB SERPL-MCNC: 0.7 MG/DL — SIGNIFICANT CHANGE UP (ref 0.2–1.2)
BILIRUB SERPL-MCNC: 0.7 MG/DL — SIGNIFICANT CHANGE UP (ref 0.2–1.2)
BILIRUB SERPL-MCNC: 0.8 MG/DL — SIGNIFICANT CHANGE UP (ref 0.2–1.2)
BILIRUB SERPL-MCNC: 0.9 MG/DL — SIGNIFICANT CHANGE UP (ref 0.2–1.2)
BILIRUB SERPL-MCNC: 0.9 MG/DL — SIGNIFICANT CHANGE UP (ref 0.2–1.2)
BILIRUB SERPL-MCNC: 1 MG/DL — SIGNIFICANT CHANGE UP (ref 0.2–1.2)
BILIRUB SERPL-MCNC: 1.1 MG/DL — SIGNIFICANT CHANGE UP (ref 0.2–1.2)
BILIRUB SERPL-MCNC: 1.2 MG/DL — SIGNIFICANT CHANGE UP (ref 0.2–1.2)
BILIRUB SERPL-MCNC: 1.2 MG/DL — SIGNIFICANT CHANGE UP (ref 0.2–1.2)
BILIRUB SERPL-MCNC: 1.3 MG/DL — HIGH (ref 0.2–1.2)
BILIRUB SERPL-MCNC: 1.4 MG/DL — HIGH (ref 0.2–1.2)
BILIRUB SERPL-MCNC: 2.8 MG/DL — HIGH (ref 0.2–1.2)
BILIRUB SERPL-MCNC: 2.9 MG/DL — HIGH (ref 0.2–1.2)
BILIRUB UR-MCNC: NEGATIVE — SIGNIFICANT CHANGE UP
BLD GP AB SCN SERPL QL: SIGNIFICANT CHANGE UP
BLOOD GAS COMMENTS ARTERIAL: SIGNIFICANT CHANGE UP
BLOOD GAS COMMENTS, VENOUS: SIGNIFICANT CHANGE UP
BUN SERPL-MCNC: 101 MG/DL — HIGH (ref 7–18)
BUN SERPL-MCNC: 105 MG/DL — HIGH (ref 7–18)
BUN SERPL-MCNC: 126 MG/DL — HIGH (ref 7–18)
BUN SERPL-MCNC: 130 MG/DL — HIGH (ref 7–18)
BUN SERPL-MCNC: 133 MG/DL — HIGH (ref 7–18)
BUN SERPL-MCNC: 135 MG/DL — HIGH (ref 7–18)
BUN SERPL-MCNC: 139 MG/DL — HIGH (ref 7–18)
BUN SERPL-MCNC: 140 MG/DL — HIGH (ref 7–18)
BUN SERPL-MCNC: 150 MG/DL — HIGH (ref 7–18)
BUN SERPL-MCNC: 31 MG/DL
BUN SERPL-MCNC: 42 MG/DL — HIGH (ref 7–18)
BUN SERPL-MCNC: 43 MG/DL — HIGH (ref 7–18)
BUN SERPL-MCNC: 45 MG/DL — HIGH (ref 7–18)
BUN SERPL-MCNC: 47 MG/DL — HIGH (ref 7–18)
BUN SERPL-MCNC: 48 MG/DL — HIGH (ref 7–18)
BUN SERPL-MCNC: 49 MG/DL — HIGH (ref 7–18)
BUN SERPL-MCNC: 49 MG/DL — HIGH (ref 7–18)
BUN SERPL-MCNC: 51 MG/DL — HIGH (ref 7–18)
BUN SERPL-MCNC: 52 MG/DL — HIGH (ref 7–18)
BUN SERPL-MCNC: 54 MG/DL — HIGH (ref 7–18)
BUN SERPL-MCNC: 55 MG/DL — HIGH (ref 7–18)
BUN SERPL-MCNC: 55 MG/DL — HIGH (ref 7–18)
BUN SERPL-MCNC: 57 MG/DL — HIGH (ref 7–18)
BUN SERPL-MCNC: 60 MG/DL — HIGH (ref 7–18)
BUN SERPL-MCNC: 62 MG/DL — HIGH (ref 7–18)
BUN SERPL-MCNC: 79 MG/DL — HIGH (ref 7–18)
BUN SERPL-MCNC: 79 MG/DL — HIGH (ref 7–18)
BUN SERPL-MCNC: 86 MG/DL — HIGH (ref 7–18)
BUN SERPL-MCNC: 93 MG/DL — HIGH (ref 7–18)
BUN SERPL-MCNC: 95 MG/DL — HIGH (ref 7–18)
BUN SERPL-MCNC: 99 MG/DL — HIGH (ref 7–18)
CA-I BLD-SCNC: 4.2 MG/DL — LOW (ref 4.5–5.6)
CA-I SERPL-SCNC: SIGNIFICANT CHANGE UP MMOL/L (ref 1.15–1.33)
CALCIUM SERPL-MCNC: 10.7 MG/DL — HIGH (ref 8.4–10.5)
CALCIUM SERPL-MCNC: 6.5 MG/DL — CRITICAL LOW (ref 8.4–10.5)
CALCIUM SERPL-MCNC: 6.6 MG/DL — LOW (ref 8.4–10.5)
CALCIUM SERPL-MCNC: 6.9 MG/DL — LOW (ref 8.4–10.5)
CALCIUM SERPL-MCNC: 7 MG/DL — LOW (ref 8.4–10.5)
CALCIUM SERPL-MCNC: 7 MG/DL — LOW (ref 8.4–10.5)
CALCIUM SERPL-MCNC: 7.2 MG/DL — LOW (ref 8.4–10.5)
CALCIUM SERPL-MCNC: 7.3 MG/DL — LOW (ref 8.4–10.5)
CALCIUM SERPL-MCNC: 7.4 MG/DL — LOW (ref 8.4–10.5)
CALCIUM SERPL-MCNC: 7.5 MG/DL — LOW (ref 8.4–10.5)
CALCIUM SERPL-MCNC: 7.5 MG/DL — LOW (ref 8.4–10.5)
CALCIUM SERPL-MCNC: 7.6 MG/DL — LOW (ref 8.4–10.5)
CALCIUM SERPL-MCNC: 7.6 MG/DL — LOW (ref 8.4–10.5)
CALCIUM SERPL-MCNC: 7.7 MG/DL — LOW (ref 8.4–10.5)
CALCIUM SERPL-MCNC: 7.8 MG/DL — LOW (ref 8.4–10.5)
CALCIUM SERPL-MCNC: 7.8 MG/DL — LOW (ref 8.4–10.5)
CALCIUM SERPL-MCNC: 7.9 MG/DL — LOW (ref 8.4–10.5)
CALCIUM SERPL-MCNC: 8.1 MG/DL — LOW (ref 8.4–10.5)
CALCIUM SERPL-MCNC: 8.5 MG/DL — SIGNIFICANT CHANGE UP (ref 8.4–10.5)
CALCIUM SERPL-MCNC: 8.8 MG/DL — SIGNIFICANT CHANGE UP (ref 8.4–10.5)
CALCIUM SERPL-MCNC: 9.2 MG/DL — SIGNIFICANT CHANGE UP (ref 8.4–10.5)
CALCIUM SERPL-MCNC: 9.4 MG/DL — SIGNIFICANT CHANGE UP (ref 8.4–10.5)
CALCIUM SERPL-MCNC: 9.5 MG/DL
CALCIUM SERPL-MCNC: 9.6 MG/DL — SIGNIFICANT CHANGE UP (ref 8.4–10.5)
CHLORIDE SERPL-SCNC: 101 MMOL/L — SIGNIFICANT CHANGE UP (ref 96–108)
CHLORIDE SERPL-SCNC: 101 MMOL/L — SIGNIFICANT CHANGE UP (ref 96–108)
CHLORIDE SERPL-SCNC: 102 MMOL/L — SIGNIFICANT CHANGE UP (ref 96–108)
CHLORIDE SERPL-SCNC: 102 MMOL/L — SIGNIFICANT CHANGE UP (ref 96–108)
CHLORIDE SERPL-SCNC: 103 MMOL/L — SIGNIFICANT CHANGE UP (ref 96–108)
CHLORIDE SERPL-SCNC: 103 MMOL/L — SIGNIFICANT CHANGE UP (ref 96–108)
CHLORIDE SERPL-SCNC: 104 MMOL/L — SIGNIFICANT CHANGE UP (ref 96–108)
CHLORIDE SERPL-SCNC: 105 MMOL/L
CHLORIDE SERPL-SCNC: 105 MMOL/L — SIGNIFICANT CHANGE UP (ref 96–108)
CHLORIDE SERPL-SCNC: 106 MMOL/L — SIGNIFICANT CHANGE UP (ref 96–108)
CHLORIDE SERPL-SCNC: 107 MMOL/L — SIGNIFICANT CHANGE UP (ref 96–108)
CHLORIDE SERPL-SCNC: 108 MMOL/L — SIGNIFICANT CHANGE UP (ref 96–108)
CHLORIDE SERPL-SCNC: 109 MMOL/L — HIGH (ref 96–108)
CHLORIDE SERPL-SCNC: 110 MMOL/L — HIGH (ref 96–108)
CHLORIDE SERPL-SCNC: 111 MMOL/L — HIGH (ref 96–108)
CHLORIDE SERPL-SCNC: 112 MMOL/L — HIGH (ref 96–108)
CHLORIDE SERPL-SCNC: 113 MMOL/L — HIGH (ref 96–108)
CHLORIDE SERPL-SCNC: 99 MMOL/L — SIGNIFICANT CHANGE UP (ref 96–108)
CHOLEST SERPL-MCNC: 167 MG/DL
CHOLEST SERPL-MCNC: 62 MG/DL — SIGNIFICANT CHANGE UP
CK MB BLD-MCNC: 4.6 % — HIGH (ref 0–3.5)
CK MB CFR SERPL CALC: 4.6 NG/ML — HIGH (ref 0–3.6)
CK SERPL-CCNC: 101 U/L — SIGNIFICANT CHANGE UP (ref 35–232)
CK SERPL-CCNC: 232 U/L — SIGNIFICANT CHANGE UP (ref 35–232)
CLOSURE TME COLL+EPINEP BLD: 58 K/UL — LOW (ref 150–400)
CO2 SERPL-SCNC: 13 MMOL/L — LOW (ref 22–31)
CO2 SERPL-SCNC: 13 MMOL/L — LOW (ref 22–31)
CO2 SERPL-SCNC: 14 MMOL/L — LOW (ref 22–31)
CO2 SERPL-SCNC: 14 MMOL/L — LOW (ref 22–31)
CO2 SERPL-SCNC: 15 MMOL/L — LOW (ref 22–31)
CO2 SERPL-SCNC: 16 MMOL/L — LOW (ref 22–31)
CO2 SERPL-SCNC: 17 MMOL/L — LOW (ref 22–31)
CO2 SERPL-SCNC: 19 MMOL/L — LOW (ref 22–31)
CO2 SERPL-SCNC: 20 MMOL/L — LOW (ref 22–31)
CO2 SERPL-SCNC: 20 MMOL/L — LOW (ref 22–31)
CO2 SERPL-SCNC: 21 MMOL/L — LOW (ref 22–31)
CO2 SERPL-SCNC: 21 MMOL/L — LOW (ref 22–31)
CO2 SERPL-SCNC: 22 MMOL/L
CO2 SERPL-SCNC: 22 MMOL/L — SIGNIFICANT CHANGE UP (ref 22–31)
CO2 SERPL-SCNC: 23 MMOL/L — SIGNIFICANT CHANGE UP (ref 22–31)
CO2 SERPL-SCNC: 23 MMOL/L — SIGNIFICANT CHANGE UP (ref 22–31)
CO2 SERPL-SCNC: 24 MMOL/L — SIGNIFICANT CHANGE UP (ref 22–31)
CO2 SERPL-SCNC: 25 MMOL/L — SIGNIFICANT CHANGE UP (ref 22–31)
CO2 SERPL-SCNC: 26 MMOL/L — SIGNIFICANT CHANGE UP (ref 22–31)
CO2 SERPL-SCNC: 27 MMOL/L — SIGNIFICANT CHANGE UP (ref 22–31)
COLOR SPEC: YELLOW — SIGNIFICANT CHANGE UP
CREAT ?TM UR-MCNC: 76 MG/DL — SIGNIFICANT CHANGE UP
CREAT SERPL-MCNC: 1.21 MG/DL
CREAT SERPL-MCNC: 1.67 MG/DL — HIGH (ref 0.5–1.3)
CREAT SERPL-MCNC: 1.69 MG/DL — HIGH (ref 0.5–1.3)
CREAT SERPL-MCNC: 2.08 MG/DL — HIGH (ref 0.5–1.3)
CREAT SERPL-MCNC: 2.54 MG/DL — HIGH (ref 0.5–1.3)
CREAT SERPL-MCNC: 2.59 MG/DL — HIGH (ref 0.5–1.3)
CREAT SERPL-MCNC: 2.6 MG/DL — HIGH (ref 0.5–1.3)
CREAT SERPL-MCNC: 2.87 MG/DL — HIGH (ref 0.5–1.3)
CREAT SERPL-MCNC: 3.16 MG/DL — HIGH (ref 0.5–1.3)
CREAT SERPL-MCNC: 3.25 MG/DL — HIGH (ref 0.5–1.3)
CREAT SERPL-MCNC: 3.39 MG/DL — HIGH (ref 0.5–1.3)
CREAT SERPL-MCNC: 3.47 MG/DL — HIGH (ref 0.5–1.3)
CREAT SERPL-MCNC: 3.55 MG/DL — HIGH (ref 0.5–1.3)
CREAT SERPL-MCNC: 3.65 MG/DL — HIGH (ref 0.5–1.3)
CREAT SERPL-MCNC: 3.88 MG/DL — HIGH (ref 0.5–1.3)
CREAT SERPL-MCNC: 3.9 MG/DL — HIGH (ref 0.5–1.3)
CREAT SERPL-MCNC: 4.26 MG/DL — HIGH (ref 0.5–1.3)
CREAT SERPL-MCNC: 4.48 MG/DL — HIGH (ref 0.5–1.3)
CREAT SERPL-MCNC: 4.52 MG/DL — HIGH (ref 0.5–1.3)
CREAT SERPL-MCNC: 4.7 MG/DL — HIGH (ref 0.5–1.3)
CREAT SERPL-MCNC: 4.76 MG/DL — HIGH (ref 0.5–1.3)
CREAT SERPL-MCNC: 4.79 MG/DL — HIGH (ref 0.5–1.3)
CREAT SERPL-MCNC: 4.98 MG/DL — HIGH (ref 0.5–1.3)
CREAT SERPL-MCNC: 5.41 MG/DL — HIGH (ref 0.5–1.3)
CREAT SERPL-MCNC: 5.68 MG/DL — HIGH (ref 0.5–1.3)
CREAT SERPL-MCNC: 6.14 MG/DL — HIGH (ref 0.5–1.3)
CREAT SERPL-MCNC: 6.23 MG/DL — HIGH (ref 0.5–1.3)
CREAT SERPL-MCNC: 6.35 MG/DL — HIGH (ref 0.5–1.3)
CREAT SERPL-MCNC: 6.41 MG/DL — HIGH (ref 0.5–1.3)
CREAT SERPL-MCNC: 6.58 MG/DL — HIGH (ref 0.5–1.3)
CREAT SERPL-MCNC: 6.62 MG/DL — HIGH (ref 0.5–1.3)
CULTURE RESULTS: SIGNIFICANT CHANGE UP
D DIMER BLD IA.RAPID-MCNC: 7583 NG/ML DDU — HIGH
D DIMER BLD IA.RAPID-MCNC: HIGH NG/ML DDU
D DIMER BLD IA.RAPID-MCNC: HIGH NG/ML DDU
DIFF PNL FLD: ABNORMAL
EGFR: 10 ML/MIN/1.73M2 — LOW
EGFR: 10 ML/MIN/1.73M2 — LOW
EGFR: 11 ML/MIN/1.73M2 — LOW
EGFR: 12 ML/MIN/1.73M2 — LOW
EGFR: 13 ML/MIN/1.73M2 — LOW
EGFR: 13 ML/MIN/1.73M2 — LOW
EGFR: 14 ML/MIN/1.73M2 — LOW
EGFR: 15 ML/MIN/1.73M2 — LOW
EGFR: 15 ML/MIN/1.73M2 — LOW
EGFR: 16 ML/MIN/1.73M2 — LOW
EGFR: 17 ML/MIN/1.73M2 — LOW
EGFR: 17 ML/MIN/1.73M2 — LOW
EGFR: 18 ML/MIN/1.73M2 — LOW
EGFR: 19 ML/MIN/1.73M2 — LOW
EGFR: 19 ML/MIN/1.73M2 — LOW
EGFR: 22 ML/MIN/1.73M2 — LOW
EGFR: 24 ML/MIN/1.73M2 — LOW
EGFR: 25 ML/MIN/1.73M2 — LOW
EGFR: 25 ML/MIN/1.73M2 — LOW
EGFR: 32 ML/MIN/1.73M2 — LOW
EGFR: 41 ML/MIN/1.73M2 — LOW
EGFR: 42 ML/MIN/1.73M2 — LOW
EGFR: 61 ML/MIN/1.73M2
EGFR: 8 ML/MIN/1.73M2 — LOW
EGFR: 9 ML/MIN/1.73M2 — LOW
EGFR: 9 ML/MIN/1.73M2 — LOW
ELLIPTOCYTES BLD QL SMEAR: SLIGHT — SIGNIFICANT CHANGE UP
EOSINOPHIL # BLD AUTO: 0 K/UL — SIGNIFICANT CHANGE UP (ref 0–0.5)
EOSINOPHIL # BLD AUTO: 0.01 K/UL — SIGNIFICANT CHANGE UP (ref 0–0.5)
EOSINOPHIL # BLD AUTO: 0.01 K/UL — SIGNIFICANT CHANGE UP (ref 0–0.5)
EOSINOPHIL # BLD AUTO: 0.05 K/UL — SIGNIFICANT CHANGE UP (ref 0–0.5)
EOSINOPHIL # BLD AUTO: 0.12 K/UL — SIGNIFICANT CHANGE UP (ref 0–0.5)
EOSINOPHIL # BLD AUTO: 0.16 K/UL — SIGNIFICANT CHANGE UP (ref 0–0.5)
EOSINOPHIL NFR BLD AUTO: 0 % — SIGNIFICANT CHANGE UP (ref 0–6)
EOSINOPHIL NFR BLD AUTO: 0.1 % — SIGNIFICANT CHANGE UP (ref 0–6)
EOSINOPHIL NFR BLD AUTO: 0.1 % — SIGNIFICANT CHANGE UP (ref 0–6)
EOSINOPHIL NFR BLD AUTO: 0.7 % — SIGNIFICANT CHANGE UP (ref 0–6)
EOSINOPHIL NFR BLD AUTO: 0.9 % — SIGNIFICANT CHANGE UP (ref 0–6)
EOSINOPHIL NFR BLD AUTO: 1 % — SIGNIFICANT CHANGE UP (ref 0–6)
ESTIMATED AVERAGE GLUCOSE: 134 MG/DL
FIBRINOGEN PPP-MCNC: 116 MG/DL — LOW (ref 200–475)
FIBRINOGEN PPP-MCNC: 317 MG/DL — SIGNIFICANT CHANGE UP (ref 200–475)
FIBRINOGEN PPP-MCNC: 563 MG/DL — HIGH (ref 200–475)
FIBRINOGEN PPP-MCNC: 626 MG/DL — HIGH (ref 200–475)
FIBRINOGEN PPP-MCNC: 810 MG/DL — HIGH (ref 200–475)
FIBRINOGEN PPP-MCNC: 902 MG/DL — HIGH (ref 200–475)
G6PD RBC-CCNC: 12.2 U/G HGB — SIGNIFICANT CHANGE UP (ref 7–20.5)
GAS PNL BLDA: SIGNIFICANT CHANGE UP
GAS PNL BLDV: 132 MMOL/L — LOW (ref 136–145)
GAS PNL BLDV: 134 MMOL/L — LOW (ref 136–145)
GAS PNL BLDV: SIGNIFICANT CHANGE UP
GLUCOSE BLDC GLUCOMTR-MCNC: 103 MG/DL — HIGH (ref 70–99)
GLUCOSE BLDC GLUCOMTR-MCNC: 105 MG/DL — HIGH (ref 70–99)
GLUCOSE BLDC GLUCOMTR-MCNC: 108 MG/DL — HIGH (ref 70–99)
GLUCOSE BLDC GLUCOMTR-MCNC: 108 MG/DL — HIGH (ref 70–99)
GLUCOSE BLDC GLUCOMTR-MCNC: 110 MG/DL — HIGH (ref 70–99)
GLUCOSE BLDC GLUCOMTR-MCNC: 114 MG/DL — HIGH (ref 70–99)
GLUCOSE BLDC GLUCOMTR-MCNC: 121 MG/DL — HIGH (ref 70–99)
GLUCOSE BLDC GLUCOMTR-MCNC: 123 MG/DL — HIGH (ref 70–99)
GLUCOSE BLDC GLUCOMTR-MCNC: 123 MG/DL — HIGH (ref 70–99)
GLUCOSE BLDC GLUCOMTR-MCNC: 125 MG/DL — HIGH (ref 70–99)
GLUCOSE BLDC GLUCOMTR-MCNC: 130 MG/DL — HIGH (ref 70–99)
GLUCOSE BLDC GLUCOMTR-MCNC: 132 MG/DL — HIGH (ref 70–99)
GLUCOSE BLDC GLUCOMTR-MCNC: 135 MG/DL — HIGH (ref 70–99)
GLUCOSE BLDC GLUCOMTR-MCNC: 141 MG/DL — HIGH (ref 70–99)
GLUCOSE BLDC GLUCOMTR-MCNC: 142 MG/DL — HIGH (ref 70–99)
GLUCOSE BLDC GLUCOMTR-MCNC: 144 MG/DL — HIGH (ref 70–99)
GLUCOSE BLDC GLUCOMTR-MCNC: 147 MG/DL — HIGH (ref 70–99)
GLUCOSE BLDC GLUCOMTR-MCNC: 149 MG/DL — HIGH (ref 70–99)
GLUCOSE BLDC GLUCOMTR-MCNC: 150 MG/DL — HIGH (ref 70–99)
GLUCOSE BLDC GLUCOMTR-MCNC: 159 MG/DL — HIGH (ref 70–99)
GLUCOSE BLDC GLUCOMTR-MCNC: 160 MG/DL — HIGH (ref 70–99)
GLUCOSE BLDC GLUCOMTR-MCNC: 162 MG/DL — HIGH (ref 70–99)
GLUCOSE BLDC GLUCOMTR-MCNC: 163 MG/DL — HIGH (ref 70–99)
GLUCOSE BLDC GLUCOMTR-MCNC: 171 MG/DL — HIGH (ref 70–99)
GLUCOSE BLDC GLUCOMTR-MCNC: 172 MG/DL — HIGH (ref 70–99)
GLUCOSE BLDC GLUCOMTR-MCNC: 186 MG/DL — HIGH (ref 70–99)
GLUCOSE BLDC GLUCOMTR-MCNC: 190 MG/DL — HIGH (ref 70–99)
GLUCOSE BLDC GLUCOMTR-MCNC: 196 MG/DL — HIGH (ref 70–99)
GLUCOSE BLDC GLUCOMTR-MCNC: 204 MG/DL — HIGH (ref 70–99)
GLUCOSE BLDC GLUCOMTR-MCNC: 204 MG/DL — HIGH (ref 70–99)
GLUCOSE BLDC GLUCOMTR-MCNC: 207 MG/DL — HIGH (ref 70–99)
GLUCOSE BLDC GLUCOMTR-MCNC: 210 MG/DL — HIGH (ref 70–99)
GLUCOSE BLDC GLUCOMTR-MCNC: 280 MG/DL — HIGH (ref 70–99)
GLUCOSE BLDC GLUCOMTR-MCNC: 293 MG/DL — HIGH (ref 70–99)
GLUCOSE BLDC GLUCOMTR-MCNC: 352 MG/DL — HIGH (ref 70–99)
GLUCOSE BLDC GLUCOMTR-MCNC: 408 MG/DL — HIGH (ref 70–99)
GLUCOSE BLDC GLUCOMTR-MCNC: 67 MG/DL — LOW (ref 70–99)
GLUCOSE BLDC GLUCOMTR-MCNC: 73 MG/DL — SIGNIFICANT CHANGE UP (ref 70–99)
GLUCOSE BLDC GLUCOMTR-MCNC: 98 MG/DL — SIGNIFICANT CHANGE UP (ref 70–99)
GLUCOSE BLDC GLUCOMTR-MCNC: 99 MG/DL — SIGNIFICANT CHANGE UP (ref 70–99)
GLUCOSE SERPL-MCNC: 118 MG/DL — HIGH (ref 70–99)
GLUCOSE SERPL-MCNC: 139 MG/DL — HIGH (ref 70–99)
GLUCOSE SERPL-MCNC: 140 MG/DL — HIGH (ref 70–99)
GLUCOSE SERPL-MCNC: 146 MG/DL — HIGH (ref 70–99)
GLUCOSE SERPL-MCNC: 147 MG/DL — HIGH (ref 70–99)
GLUCOSE SERPL-MCNC: 153 MG/DL — HIGH (ref 70–99)
GLUCOSE SERPL-MCNC: 154 MG/DL — HIGH (ref 70–99)
GLUCOSE SERPL-MCNC: 159 MG/DL — HIGH (ref 70–99)
GLUCOSE SERPL-MCNC: 160 MG/DL — HIGH (ref 70–99)
GLUCOSE SERPL-MCNC: 162 MG/DL — HIGH (ref 70–99)
GLUCOSE SERPL-MCNC: 173 MG/DL — HIGH (ref 70–99)
GLUCOSE SERPL-MCNC: 177 MG/DL — HIGH (ref 70–99)
GLUCOSE SERPL-MCNC: 179 MG/DL — HIGH (ref 70–99)
GLUCOSE SERPL-MCNC: 189 MG/DL — HIGH (ref 70–99)
GLUCOSE SERPL-MCNC: 195 MG/DL — HIGH (ref 70–99)
GLUCOSE SERPL-MCNC: 201 MG/DL — HIGH (ref 70–99)
GLUCOSE SERPL-MCNC: 202 MG/DL — HIGH (ref 70–99)
GLUCOSE SERPL-MCNC: 202 MG/DL — HIGH (ref 70–99)
GLUCOSE SERPL-MCNC: 204 MG/DL — HIGH (ref 70–99)
GLUCOSE SERPL-MCNC: 205 MG/DL — HIGH (ref 70–99)
GLUCOSE SERPL-MCNC: 207 MG/DL — HIGH (ref 70–99)
GLUCOSE SERPL-MCNC: 212 MG/DL — HIGH (ref 70–99)
GLUCOSE SERPL-MCNC: 226 MG/DL — HIGH (ref 70–99)
GLUCOSE SERPL-MCNC: 228 MG/DL — HIGH (ref 70–99)
GLUCOSE SERPL-MCNC: 229 MG/DL — HIGH (ref 70–99)
GLUCOSE SERPL-MCNC: 264 MG/DL — HIGH (ref 70–99)
GLUCOSE SERPL-MCNC: 264 MG/DL — HIGH (ref 70–99)
GLUCOSE SERPL-MCNC: 318 MG/DL — HIGH (ref 70–99)
GLUCOSE SERPL-MCNC: 325 MG/DL — HIGH (ref 70–99)
GLUCOSE SERPL-MCNC: 341 MG/DL — HIGH (ref 70–99)
GLUCOSE SERPL-MCNC: 88 MG/DL
GLUCOSE UR QL: 1000 MG/DL
HAPTOGLOB SERPL-MCNC: 219 MG/DL — HIGH (ref 34–200)
HAPTOGLOB SERPL-MCNC: 270 MG/DL — HIGH (ref 34–200)
HAPTOGLOB SERPL-MCNC: 331 MG/DL — HIGH (ref 34–200)
HBA1C MFR BLD HPLC: 6.3 %
HBV SURFACE AG SER-ACNC: SIGNIFICANT CHANGE UP
HCO3 BLDA-SCNC: 10 MMOL/L — CRITICAL LOW (ref 21–28)
HCO3 BLDA-SCNC: 12 MMOL/L — LOW (ref 21–28)
HCO3 BLDA-SCNC: 13 MMOL/L — LOW (ref 21–28)
HCO3 BLDA-SCNC: 13 MMOL/L — LOW (ref 21–28)
HCO3 BLDA-SCNC: 15 MMOL/L — LOW (ref 21–28)
HCO3 BLDA-SCNC: 18 MMOL/L — LOW (ref 21–28)
HCO3 BLDA-SCNC: 19 MMOL/L — LOW (ref 21–28)
HCO3 BLDA-SCNC: 19 MMOL/L — LOW (ref 21–28)
HCO3 BLDA-SCNC: 21 MMOL/L — SIGNIFICANT CHANGE UP (ref 21–28)
HCO3 BLDA-SCNC: 22 MMOL/L — SIGNIFICANT CHANGE UP (ref 21–28)
HCO3 BLDA-SCNC: 22 MMOL/L — SIGNIFICANT CHANGE UP (ref 21–28)
HCO3 BLDA-SCNC: 23 MMOL/L — SIGNIFICANT CHANGE UP (ref 21–28)
HCO3 BLDA-SCNC: 23 MMOL/L — SIGNIFICANT CHANGE UP (ref 21–28)
HCO3 BLDA-SCNC: 24 MMOL/L — SIGNIFICANT CHANGE UP (ref 21–28)
HCO3 BLDA-SCNC: 25 MMOL/L — SIGNIFICANT CHANGE UP (ref 21–28)
HCO3 BLDA-SCNC: 25 MMOL/L — SIGNIFICANT CHANGE UP (ref 21–28)
HCO3 BLDA-SCNC: 26 MMOL/L — SIGNIFICANT CHANGE UP (ref 21–28)
HCO3 BLDV-SCNC: 15 MMOL/L — LOW (ref 22–29)
HCO3 BLDV-SCNC: 18 MMOL/L — LOW (ref 22–29)
HCO3 BLDV-SCNC: 20 MMOL/L — LOW (ref 22–29)
HCO3 BLDV-SCNC: 23 MMOL/L — SIGNIFICANT CHANGE UP (ref 22–29)
HCO3 BLDV-SCNC: 25 MMOL/L — SIGNIFICANT CHANGE UP (ref 22–29)
HCT VFR BLD CALC: 25.8 % — LOW (ref 39–50)
HCT VFR BLD CALC: 26.2 % — LOW (ref 39–50)
HCT VFR BLD CALC: 27 % — LOW (ref 39–50)
HCT VFR BLD CALC: 27.6 % — LOW (ref 39–50)
HCT VFR BLD CALC: 28.2 % — LOW (ref 39–50)
HCT VFR BLD CALC: 28.2 % — LOW (ref 39–50)
HCT VFR BLD CALC: 28.5 % — LOW (ref 39–50)
HCT VFR BLD CALC: 28.6 % — LOW (ref 39–50)
HCT VFR BLD CALC: 30.5 % — LOW (ref 39–50)
HCT VFR BLD CALC: 31.8 % — LOW (ref 39–50)
HCT VFR BLD CALC: 32.2 % — LOW (ref 39–50)
HCT VFR BLD CALC: 32.3 % — LOW (ref 39–50)
HCT VFR BLD CALC: 38.1 % — LOW (ref 39–50)
HCT VFR BLD CALC: 38.8 % — LOW (ref 39–50)
HCT VFR BLD CALC: 41.3 % — SIGNIFICANT CHANGE UP (ref 39–50)
HCT VFR BLD CALC: 44.9 % — SIGNIFICANT CHANGE UP (ref 39–50)
HCT VFR BLD CALC: 45.9 % — SIGNIFICANT CHANGE UP (ref 39–50)
HCT VFR BLD CALC: 46 % — SIGNIFICANT CHANGE UP (ref 39–50)
HCT VFR BLD CALC: 46.8 % — SIGNIFICANT CHANGE UP (ref 39–50)
HCT VFR BLD CALC: 46.9 % — SIGNIFICANT CHANGE UP (ref 39–50)
HCT VFR BLD CALC: 47.1 % — SIGNIFICANT CHANGE UP (ref 39–50)
HCT VFR BLD CALC: 48.4 % — SIGNIFICANT CHANGE UP (ref 39–50)
HCT VFR BLD CALC: 49.6 % — SIGNIFICANT CHANGE UP (ref 39–50)
HDLC SERPL-MCNC: 43 MG/DL
HDLC SERPL-MCNC: 9 MG/DL — LOW
HGB BLD-MCNC: 10.1 G/DL — LOW (ref 13–17)
HGB BLD-MCNC: 10.4 G/DL — LOW (ref 13–17)
HGB BLD-MCNC: 10.5 G/DL — LOW (ref 13–17)
HGB BLD-MCNC: 10.6 G/DL — LOW (ref 13–17)
HGB BLD-MCNC: 12.5 G/DL — LOW (ref 13–17)
HGB BLD-MCNC: 12.9 G/DL — LOW (ref 13–17)
HGB BLD-MCNC: 13.6 G/DL — SIGNIFICANT CHANGE UP (ref 13–17)
HGB BLD-MCNC: 14.6 G/DL — SIGNIFICANT CHANGE UP (ref 13–17)
HGB BLD-MCNC: 14.7 G/DL — SIGNIFICANT CHANGE UP (ref 13–17)
HGB BLD-MCNC: 14.8 G/DL — SIGNIFICANT CHANGE UP (ref 13–17)
HGB BLD-MCNC: 15.2 G/DL — SIGNIFICANT CHANGE UP (ref 13–17)
HGB BLD-MCNC: 15.3 G/DL — SIGNIFICANT CHANGE UP (ref 13–17)
HGB BLD-MCNC: 15.8 G/DL — SIGNIFICANT CHANGE UP (ref 13–17)
HGB BLD-MCNC: 16 G/DL — SIGNIFICANT CHANGE UP (ref 13–17)
HGB BLD-MCNC: 16.3 G/DL — SIGNIFICANT CHANGE UP (ref 13–17)
HGB BLD-MCNC: 8.1 G/DL — LOW (ref 13–17)
HGB BLD-MCNC: 8.3 G/DL — LOW (ref 13–17)
HGB BLD-MCNC: 9.1 G/DL — LOW (ref 13–17)
HGB BLD-MCNC: 9.3 G/DL — LOW (ref 13–17)
HGB BLD-MCNC: 9.4 G/DL — LOW (ref 13–17)
HGB BLD-MCNC: 9.5 G/DL — LOW (ref 13–17)
HOROWITZ INDEX BLDA+IHG-RTO: 30 — SIGNIFICANT CHANGE UP
HOROWITZ INDEX BLDA+IHG-RTO: 40 — SIGNIFICANT CHANGE UP
HOROWITZ INDEX BLDA+IHG-RTO: 50 — SIGNIFICANT CHANGE UP
HOROWITZ INDEX BLDA+IHG-RTO: 60 — SIGNIFICANT CHANGE UP
HOROWITZ INDEX BLDA+IHG-RTO: 60 — SIGNIFICANT CHANGE UP
HOROWITZ INDEX BLDA+IHG-RTO: 70 — SIGNIFICANT CHANGE UP
HOROWITZ INDEX BLDA+IHG-RTO: 80 — SIGNIFICANT CHANGE UP
HOROWITZ INDEX BLDV+IHG-RTO: 100 — SIGNIFICANT CHANGE UP
HOROWITZ INDEX BLDV+IHG-RTO: 40 — SIGNIFICANT CHANGE UP
HYPOCHROMIA BLD QL: SIGNIFICANT CHANGE UP
HYPOSEGMENTATION: PRESENT — SIGNIFICANT CHANGE UP
HYPOSEGMENTATION: PRESENT — SIGNIFICANT CHANGE UP
IMM GRANULOCYTES NFR BLD AUTO: 0.3 % — SIGNIFICANT CHANGE UP (ref 0–0.9)
IMM GRANULOCYTES NFR BLD AUTO: 0.5 % — SIGNIFICANT CHANGE UP (ref 0–0.9)
IMM GRANULOCYTES NFR BLD AUTO: 1.2 % — HIGH (ref 0–0.9)
IMM GRANULOCYTES NFR BLD AUTO: 1.9 % — HIGH (ref 0–0.9)
IMM GRANULOCYTES NFR BLD AUTO: 2.7 % — HIGH (ref 0–0.9)
IMM GRANULOCYTES NFR BLD AUTO: 2.7 % — HIGH (ref 0–0.9)
IMM GRANULOCYTES NFR BLD AUTO: 7.4 % — HIGH (ref 0–0.9)
INR BLD: 1.14 RATIO — SIGNIFICANT CHANGE UP (ref 0.88–1.16)
INR BLD: 1.18 RATIO — HIGH (ref 0.88–1.16)
INR BLD: 1.33 RATIO — HIGH (ref 0.88–1.16)
INR BLD: 1.33 RATIO — HIGH (ref 0.88–1.16)
INR BLD: 1.44 RATIO — HIGH (ref 0.88–1.16)
INR BLD: 1.53 RATIO — HIGH (ref 0.88–1.16)
INR BLD: 1.56 RATIO — HIGH (ref 0.88–1.16)
INR BLD: 1.69 RATIO — HIGH (ref 0.88–1.16)
INR BLD: 1.72 RATIO — HIGH (ref 0.88–1.16)
KETONES UR-MCNC: NEGATIVE — SIGNIFICANT CHANGE UP
LACTATE BLDV-MCNC: 4.3 MMOL/L — CRITICAL HIGH (ref 0.5–2)
LACTATE BLDV-MCNC: 4.7 MMOL/L — CRITICAL HIGH (ref 0.5–2)
LACTATE SERPL-SCNC: 1.8 MMOL/L — SIGNIFICANT CHANGE UP (ref 0.7–2)
LACTATE SERPL-SCNC: 1.9 MMOL/L — SIGNIFICANT CHANGE UP (ref 0.7–2)
LACTATE SERPL-SCNC: 12.7 MMOL/L — CRITICAL HIGH (ref 0.7–2)
LACTATE SERPL-SCNC: 15.9 MMOL/L — CRITICAL HIGH (ref 0.7–2)
LACTATE SERPL-SCNC: 16.1 MMOL/L — CRITICAL HIGH (ref 0.7–2)
LACTATE SERPL-SCNC: 16.7 MMOL/L — CRITICAL HIGH (ref 0.7–2)
LACTATE SERPL-SCNC: 17.3 MMOL/L — CRITICAL HIGH (ref 0.7–2)
LACTATE SERPL-SCNC: 2.1 MMOL/L — HIGH (ref 0.7–2)
LACTATE SERPL-SCNC: 2.8 MMOL/L — HIGH (ref 0.7–2)
LACTATE SERPL-SCNC: 4.5 MMOL/L — CRITICAL HIGH (ref 0.7–2)
LACTATE SERPL-SCNC: 5 MMOL/L — CRITICAL HIGH (ref 0.7–2)
LACTATE SERPL-SCNC: 5.2 MMOL/L — CRITICAL HIGH (ref 0.7–2)
LACTATE SERPL-SCNC: 5.5 MMOL/L — CRITICAL HIGH (ref 0.7–2)
LACTATE SERPL-SCNC: 5.6 MMOL/L — CRITICAL HIGH (ref 0.7–2)
LACTATE SERPL-SCNC: 5.7 MMOL/L — CRITICAL HIGH (ref 0.7–2)
LACTATE SERPL-SCNC: 6.3 MMOL/L — CRITICAL HIGH (ref 0.7–2)
LACTATE SERPL-SCNC: 6.4 MMOL/L — CRITICAL HIGH (ref 0.7–2)
LACTATE SERPL-SCNC: 6.8 MMOL/L — CRITICAL HIGH (ref 0.7–2)
LACTATE SERPL-SCNC: 7 MMOL/L — CRITICAL HIGH (ref 0.7–2)
LACTATE SERPL-SCNC: 8.1 MMOL/L — CRITICAL HIGH (ref 0.7–2)
LDH SERPL L TO P-CCNC: 335 U/L — HIGH (ref 120–225)
LDH SERPL L TO P-CCNC: 353 U/L — HIGH (ref 120–225)
LDH SERPL L TO P-CCNC: 362 U/L — HIGH (ref 120–225)
LDLC SERPL CALC-MCNC: 101 MG/DL
LEUKOCYTE ESTERASE UR-ACNC: NEGATIVE — SIGNIFICANT CHANGE UP
LG PLATELETS BLD QL AUTO: SLIGHT — SIGNIFICANT CHANGE UP
LG PLATELETS BLD QL AUTO: SLIGHT — SIGNIFICANT CHANGE UP
LIDOCAIN IGE QN: 375 U/L — SIGNIFICANT CHANGE UP (ref 73–393)
LIDOCAIN IGE QN: 40 U/L — LOW (ref 73–393)
LIPID PNL WITH DIRECT LDL SERPL: 19 MG/DL — SIGNIFICANT CHANGE UP
LYMPHOCYTES # BLD AUTO: 0 % — LOW (ref 13–44)
LYMPHOCYTES # BLD AUTO: 0 K/UL — LOW (ref 1–3.3)
LYMPHOCYTES # BLD AUTO: 0.17 K/UL — LOW (ref 1–3.3)
LYMPHOCYTES # BLD AUTO: 0.32 K/UL — LOW (ref 1–3.3)
LYMPHOCYTES # BLD AUTO: 0.4 K/UL — LOW (ref 1–3.3)
LYMPHOCYTES # BLD AUTO: 0.45 K/UL — LOW (ref 1–3.3)
LYMPHOCYTES # BLD AUTO: 0.52 K/UL — LOW (ref 1–3.3)
LYMPHOCYTES # BLD AUTO: 0.53 K/UL — LOW (ref 1–3.3)
LYMPHOCYTES # BLD AUTO: 0.63 K/UL — LOW (ref 1–3.3)
LYMPHOCYTES # BLD AUTO: 0.66 K/UL — LOW (ref 1–3.3)
LYMPHOCYTES # BLD AUTO: 0.66 K/UL — LOW (ref 1–3.3)
LYMPHOCYTES # BLD AUTO: 0.68 K/UL — LOW (ref 1–3.3)
LYMPHOCYTES # BLD AUTO: 0.73 K/UL — LOW (ref 1–3.3)
LYMPHOCYTES # BLD AUTO: 0.91 K/UL — LOW (ref 1–3.3)
LYMPHOCYTES # BLD AUTO: 1.21 K/UL — SIGNIFICANT CHANGE UP (ref 1–3.3)
LYMPHOCYTES # BLD AUTO: 2 % — LOW (ref 13–44)
LYMPHOCYTES # BLD AUTO: 3.7 % — LOW (ref 13–44)
LYMPHOCYTES # BLD AUTO: 3.8 % — LOW (ref 13–44)
LYMPHOCYTES # BLD AUTO: 3.9 % — LOW (ref 13–44)
LYMPHOCYTES # BLD AUTO: 4 % — LOW (ref 13–44)
LYMPHOCYTES # BLD AUTO: 4.5 % — LOW (ref 13–44)
LYMPHOCYTES # BLD AUTO: 5 % — LOW (ref 13–44)
LYMPHOCYTES # BLD AUTO: 5.3 % — LOW (ref 13–44)
LYMPHOCYTES # BLD AUTO: 6 % — LOW (ref 13–44)
LYMPHOCYTES # BLD AUTO: 7 % — LOW (ref 13–44)
LYMPHOCYTES # BLD AUTO: 7.1 % — LOW (ref 13–44)
LYMPHOCYTES # BLD AUTO: 8 % — LOW (ref 13–44)
LYMPHOCYTES # BLD AUTO: 8 % — LOW (ref 13–44)
MAGNESIUM SERPL-MCNC: 1.6 MG/DL — SIGNIFICANT CHANGE UP (ref 1.6–2.6)
MAGNESIUM SERPL-MCNC: 1.7 MG/DL — SIGNIFICANT CHANGE UP (ref 1.6–2.6)
MAGNESIUM SERPL-MCNC: 1.8 MG/DL — SIGNIFICANT CHANGE UP (ref 1.6–2.6)
MAGNESIUM SERPL-MCNC: 1.8 MG/DL — SIGNIFICANT CHANGE UP (ref 1.6–2.6)
MAGNESIUM SERPL-MCNC: 1.9 MG/DL — SIGNIFICANT CHANGE UP (ref 1.6–2.6)
MAGNESIUM SERPL-MCNC: 2 MG/DL — SIGNIFICANT CHANGE UP (ref 1.6–2.6)
MAGNESIUM SERPL-MCNC: 2.1 MG/DL — SIGNIFICANT CHANGE UP (ref 1.6–2.6)
MAGNESIUM SERPL-MCNC: 2.1 MG/DL — SIGNIFICANT CHANGE UP (ref 1.6–2.6)
MAGNESIUM SERPL-MCNC: 2.2 MG/DL — SIGNIFICANT CHANGE UP (ref 1.6–2.6)
MAGNESIUM SERPL-MCNC: 2.4 MG/DL — SIGNIFICANT CHANGE UP (ref 1.6–2.6)
MAGNESIUM SERPL-MCNC: 2.4 MG/DL — SIGNIFICANT CHANGE UP (ref 1.6–2.6)
MAGNESIUM SERPL-MCNC: 2.8 MG/DL — HIGH (ref 1.6–2.6)
MAGNESIUM SERPL-MCNC: 2.8 MG/DL — HIGH (ref 1.6–2.6)
MAGNESIUM SERPL-MCNC: 2.9 MG/DL — HIGH (ref 1.6–2.6)
MANUAL SMEAR VERIFICATION: SIGNIFICANT CHANGE UP
MCHC RBC-ENTMCNC: 28.6 PG — SIGNIFICANT CHANGE UP (ref 27–34)
MCHC RBC-ENTMCNC: 28.7 PG — SIGNIFICANT CHANGE UP (ref 27–34)
MCHC RBC-ENTMCNC: 28.8 PG — SIGNIFICANT CHANGE UP (ref 27–34)
MCHC RBC-ENTMCNC: 28.8 PG — SIGNIFICANT CHANGE UP (ref 27–34)
MCHC RBC-ENTMCNC: 28.9 PG — SIGNIFICANT CHANGE UP (ref 27–34)
MCHC RBC-ENTMCNC: 29 PG — SIGNIFICANT CHANGE UP (ref 27–34)
MCHC RBC-ENTMCNC: 29.1 PG — SIGNIFICANT CHANGE UP (ref 27–34)
MCHC RBC-ENTMCNC: 29.2 PG — SIGNIFICANT CHANGE UP (ref 27–34)
MCHC RBC-ENTMCNC: 29.3 PG — SIGNIFICANT CHANGE UP (ref 27–34)
MCHC RBC-ENTMCNC: 29.4 PG — SIGNIFICANT CHANGE UP (ref 27–34)
MCHC RBC-ENTMCNC: 29.5 PG — SIGNIFICANT CHANGE UP (ref 27–34)
MCHC RBC-ENTMCNC: 29.5 PG — SIGNIFICANT CHANGE UP (ref 27–34)
MCHC RBC-ENTMCNC: 29.6 PG — SIGNIFICANT CHANGE UP (ref 27–34)
MCHC RBC-ENTMCNC: 29.8 PG — SIGNIFICANT CHANGE UP (ref 27–34)
MCHC RBC-ENTMCNC: 30.1 PG — SIGNIFICANT CHANGE UP (ref 27–34)
MCHC RBC-ENTMCNC: 30.4 PG — SIGNIFICANT CHANGE UP (ref 27–34)
MCHC RBC-ENTMCNC: 31.2 GM/DL — LOW (ref 32–36)
MCHC RBC-ENTMCNC: 31.4 GM/DL — LOW (ref 32–36)
MCHC RBC-ENTMCNC: 31.7 GM/DL — LOW (ref 32–36)
MCHC RBC-ENTMCNC: 31.7 GM/DL — LOW (ref 32–36)
MCHC RBC-ENTMCNC: 32.6 GM/DL — SIGNIFICANT CHANGE UP (ref 32–36)
MCHC RBC-ENTMCNC: 32.7 GM/DL — SIGNIFICANT CHANGE UP (ref 32–36)
MCHC RBC-ENTMCNC: 32.7 GM/DL — SIGNIFICANT CHANGE UP (ref 32–36)
MCHC RBC-ENTMCNC: 32.8 GM/DL — SIGNIFICANT CHANGE UP (ref 32–36)
MCHC RBC-ENTMCNC: 32.8 GM/DL — SIGNIFICANT CHANGE UP (ref 32–36)
MCHC RBC-ENTMCNC: 32.9 GM/DL — SIGNIFICANT CHANGE UP (ref 32–36)
MCHC RBC-ENTMCNC: 32.9 GM/DL — SIGNIFICANT CHANGE UP (ref 32–36)
MCHC RBC-ENTMCNC: 33 GM/DL — SIGNIFICANT CHANGE UP (ref 32–36)
MCHC RBC-ENTMCNC: 33 GM/DL — SIGNIFICANT CHANGE UP (ref 32–36)
MCHC RBC-ENTMCNC: 33.1 GM/DL — SIGNIFICANT CHANGE UP (ref 32–36)
MCHC RBC-ENTMCNC: 33.2 GM/DL — SIGNIFICANT CHANGE UP (ref 32–36)
MCHC RBC-ENTMCNC: 33.2 GM/DL — SIGNIFICANT CHANGE UP (ref 32–36)
MCHC RBC-ENTMCNC: 33.3 GM/DL — SIGNIFICANT CHANGE UP (ref 32–36)
MCHC RBC-ENTMCNC: 33.3 GM/DL — SIGNIFICANT CHANGE UP (ref 32–36)
MCHC RBC-ENTMCNC: 33.7 GM/DL — SIGNIFICANT CHANGE UP (ref 32–36)
MCV RBC AUTO: 86 FL — SIGNIFICANT CHANGE UP (ref 80–100)
MCV RBC AUTO: 87.2 FL — SIGNIFICANT CHANGE UP (ref 80–100)
MCV RBC AUTO: 87.3 FL — SIGNIFICANT CHANGE UP (ref 80–100)
MCV RBC AUTO: 87.7 FL — SIGNIFICANT CHANGE UP (ref 80–100)
MCV RBC AUTO: 87.7 FL — SIGNIFICANT CHANGE UP (ref 80–100)
MCV RBC AUTO: 87.9 FL — SIGNIFICANT CHANGE UP (ref 80–100)
MCV RBC AUTO: 88 FL — SIGNIFICANT CHANGE UP (ref 80–100)
MCV RBC AUTO: 88.3 FL — SIGNIFICANT CHANGE UP (ref 80–100)
MCV RBC AUTO: 88.4 FL — SIGNIFICANT CHANGE UP (ref 80–100)
MCV RBC AUTO: 89 FL — SIGNIFICANT CHANGE UP (ref 80–100)
MCV RBC AUTO: 89 FL — SIGNIFICANT CHANGE UP (ref 80–100)
MCV RBC AUTO: 89.4 FL — SIGNIFICANT CHANGE UP (ref 80–100)
MCV RBC AUTO: 89.5 FL — SIGNIFICANT CHANGE UP (ref 80–100)
MCV RBC AUTO: 90.1 FL — SIGNIFICANT CHANGE UP (ref 80–100)
MCV RBC AUTO: 90.2 FL — SIGNIFICANT CHANGE UP (ref 80–100)
MCV RBC AUTO: 91 FL — SIGNIFICANT CHANGE UP (ref 80–100)
MCV RBC AUTO: 91.3 FL — SIGNIFICANT CHANGE UP (ref 80–100)
MCV RBC AUTO: 91.4 FL — SIGNIFICANT CHANGE UP (ref 80–100)
MCV RBC AUTO: 91.6 FL — SIGNIFICANT CHANGE UP (ref 80–100)
MCV RBC AUTO: 91.8 FL — SIGNIFICANT CHANGE UP (ref 80–100)
MCV RBC AUTO: 92.2 FL — SIGNIFICANT CHANGE UP (ref 80–100)
METAMYELOCYTES # FLD: 1 % — HIGH (ref 0–0)
METAMYELOCYTES # FLD: 1 % — HIGH (ref 0–0)
METHOD TYPE: SIGNIFICANT CHANGE UP
MONOCYTES # BLD AUTO: 0 K/UL — SIGNIFICANT CHANGE UP (ref 0–0.9)
MONOCYTES # BLD AUTO: 0.11 K/UL — SIGNIFICANT CHANGE UP (ref 0–0.9)
MONOCYTES # BLD AUTO: 0.14 K/UL — SIGNIFICANT CHANGE UP (ref 0–0.9)
MONOCYTES # BLD AUTO: 0.23 K/UL — SIGNIFICANT CHANGE UP (ref 0–0.9)
MONOCYTES # BLD AUTO: 0.4 K/UL — SIGNIFICANT CHANGE UP (ref 0–0.9)
MONOCYTES # BLD AUTO: 0.41 K/UL — SIGNIFICANT CHANGE UP (ref 0–0.9)
MONOCYTES # BLD AUTO: 0.46 K/UL — SIGNIFICANT CHANGE UP (ref 0–0.9)
MONOCYTES # BLD AUTO: 0.54 K/UL — SIGNIFICANT CHANGE UP (ref 0–0.9)
MONOCYTES # BLD AUTO: 0.63 K/UL — SIGNIFICANT CHANGE UP (ref 0–0.9)
MONOCYTES # BLD AUTO: 0.69 K/UL — SIGNIFICANT CHANGE UP (ref 0–0.9)
MONOCYTES # BLD AUTO: 0.7 K/UL — SIGNIFICANT CHANGE UP (ref 0–0.9)
MONOCYTES # BLD AUTO: 0.75 K/UL — SIGNIFICANT CHANGE UP (ref 0–0.9)
MONOCYTES # BLD AUTO: 0.77 K/UL — SIGNIFICANT CHANGE UP (ref 0–0.9)
MONOCYTES # BLD AUTO: 0.82 K/UL — SIGNIFICANT CHANGE UP (ref 0–0.9)
MONOCYTES NFR BLD AUTO: 0 % — LOW (ref 2–14)
MONOCYTES NFR BLD AUTO: 1 % — LOW (ref 2–14)
MONOCYTES NFR BLD AUTO: 1 % — LOW (ref 2–14)
MONOCYTES NFR BLD AUTO: 11 % — SIGNIFICANT CHANGE UP (ref 2–14)
MONOCYTES NFR BLD AUTO: 2 % — SIGNIFICANT CHANGE UP (ref 2–14)
MONOCYTES NFR BLD AUTO: 2.9 % — SIGNIFICANT CHANGE UP (ref 2–14)
MONOCYTES NFR BLD AUTO: 2.9 % — SIGNIFICANT CHANGE UP (ref 2–14)
MONOCYTES NFR BLD AUTO: 4 % — SIGNIFICANT CHANGE UP (ref 2–14)
MONOCYTES NFR BLD AUTO: 4 % — SIGNIFICANT CHANGE UP (ref 2–14)
MONOCYTES NFR BLD AUTO: 4.1 % — SIGNIFICANT CHANGE UP (ref 2–14)
MONOCYTES NFR BLD AUTO: 5.7 % — SIGNIFICANT CHANGE UP (ref 2–14)
MONOCYTES NFR BLD AUTO: 7 % — SIGNIFICANT CHANGE UP (ref 2–14)
MONOCYTES NFR BLD AUTO: 9 % — SIGNIFICANT CHANGE UP (ref 2–14)
MONOCYTES NFR BLD AUTO: 9.9 % — SIGNIFICANT CHANGE UP (ref 2–14)
MRSA PCR RESULT.: SIGNIFICANT CHANGE UP
MYELOCYTES NFR BLD: 3 % — HIGH (ref 0–0)
NEUTROPHILS # BLD AUTO: 10.15 K/UL — HIGH (ref 1.8–7.4)
NEUTROPHILS # BLD AUTO: 11.22 K/UL — HIGH (ref 1.8–7.4)
NEUTROPHILS # BLD AUTO: 11.85 K/UL — HIGH (ref 1.8–7.4)
NEUTROPHILS # BLD AUTO: 12.06 K/UL — HIGH (ref 1.8–7.4)
NEUTROPHILS # BLD AUTO: 12.54 K/UL — HIGH (ref 1.8–7.4)
NEUTROPHILS # BLD AUTO: 12.64 K/UL — HIGH (ref 1.8–7.4)
NEUTROPHILS # BLD AUTO: 14.16 K/UL — HIGH (ref 1.8–7.4)
NEUTROPHILS # BLD AUTO: 14.73 K/UL — HIGH (ref 1.8–7.4)
NEUTROPHILS # BLD AUTO: 3.43 K/UL — SIGNIFICANT CHANGE UP (ref 1.8–7.4)
NEUTROPHILS # BLD AUTO: 6.25 K/UL — SIGNIFICANT CHANGE UP (ref 1.8–7.4)
NEUTROPHILS # BLD AUTO: 7.33 K/UL — SIGNIFICANT CHANGE UP (ref 1.8–7.4)
NEUTROPHILS # BLD AUTO: 8.35 K/UL — HIGH (ref 1.8–7.4)
NEUTROPHILS # BLD AUTO: 9.24 K/UL — HIGH (ref 1.8–7.4)
NEUTROPHILS # BLD AUTO: 9.94 K/UL — HIGH (ref 1.8–7.4)
NEUTROPHILS NFR BLD AUTO: 72 % — SIGNIFICANT CHANGE UP (ref 43–77)
NEUTROPHILS NFR BLD AUTO: 77 % — SIGNIFICANT CHANGE UP (ref 43–77)
NEUTROPHILS NFR BLD AUTO: 77 % — SIGNIFICANT CHANGE UP (ref 43–77)
NEUTROPHILS NFR BLD AUTO: 78 % — HIGH (ref 43–77)
NEUTROPHILS NFR BLD AUTO: 82.4 % — HIGH (ref 43–77)
NEUTROPHILS NFR BLD AUTO: 83 % — HIGH (ref 43–77)
NEUTROPHILS NFR BLD AUTO: 83.1 % — HIGH (ref 43–77)
NEUTROPHILS NFR BLD AUTO: 85 % — HIGH (ref 43–77)
NEUTROPHILS NFR BLD AUTO: 86.8 % — HIGH (ref 43–77)
NEUTROPHILS NFR BLD AUTO: 89.3 % — HIGH (ref 43–77)
NEUTROPHILS NFR BLD AUTO: 90.6 % — HIGH (ref 43–77)
NEUTROPHILS NFR BLD AUTO: 90.6 % — HIGH (ref 43–77)
NEUTROPHILS NFR BLD AUTO: 92.5 % — HIGH (ref 43–77)
NEUTROPHILS NFR BLD AUTO: 94 % — HIGH (ref 43–77)
NEUTS BAND # BLD: 2 % — SIGNIFICANT CHANGE UP (ref 0–8)
NEUTS BAND # BLD: 4 % — SIGNIFICANT CHANGE UP (ref 0–8)
NEUTS BAND # BLD: 4 % — SIGNIFICANT CHANGE UP (ref 0–8)
NEUTS BAND # BLD: 5 % — SIGNIFICANT CHANGE UP (ref 0–8)
NEUTS BAND # BLD: 7 % — SIGNIFICANT CHANGE UP (ref 0–8)
NEUTS BAND # BLD: 8 % — SIGNIFICANT CHANGE UP (ref 0–8)
NEUTS BAND # BLD: 9 % — HIGH (ref 0–8)
NITRITE UR-MCNC: NEGATIVE — SIGNIFICANT CHANGE UP
NON HDL CHOLESTEROL: 53 MG/DL — SIGNIFICANT CHANGE UP
NONHDLC SERPL-MCNC: 124 MG/DL
NRBC # BLD: 0 /100 WBCS — SIGNIFICANT CHANGE UP (ref 0–0)
NRBC # BLD: 0 /100 — SIGNIFICANT CHANGE UP (ref 0–0)
NRBC # BLD: 1 /100 — HIGH (ref 0–0)
NRBC # BLD: 2 /100 WBCS — HIGH (ref 0–0)
NRBC # BLD: 6 /100 WBCS — HIGH (ref 0–0)
NRBC # BLD: 8 /100 WBCS — HIGH (ref 0–0)
ORGANISM # SPEC MICROSCOPIC CNT: SIGNIFICANT CHANGE UP
ORGANISM # SPEC MICROSCOPIC CNT: SIGNIFICANT CHANGE UP
PCO2 BLDA: 30 MMHG — LOW (ref 35–48)
PCO2 BLDA: 30 MMHG — LOW (ref 35–48)
PCO2 BLDA: 31 MMHG — LOW (ref 35–48)
PCO2 BLDA: 34 MMHG — LOW (ref 35–48)
PCO2 BLDA: 34 MMHG — LOW (ref 35–48)
PCO2 BLDA: 35 MMHG — SIGNIFICANT CHANGE UP (ref 35–48)
PCO2 BLDA: 36 MMHG — SIGNIFICANT CHANGE UP (ref 35–48)
PCO2 BLDA: 38 MMHG — SIGNIFICANT CHANGE UP (ref 35–48)
PCO2 BLDA: 38 MMHG — SIGNIFICANT CHANGE UP (ref 35–48)
PCO2 BLDA: 39 MMHG — SIGNIFICANT CHANGE UP (ref 35–48)
PCO2 BLDA: 40 MMHG — SIGNIFICANT CHANGE UP (ref 35–48)
PCO2 BLDA: 41 MMHG — SIGNIFICANT CHANGE UP (ref 35–48)
PCO2 BLDA: 42 MMHG — SIGNIFICANT CHANGE UP (ref 35–48)
PCO2 BLDA: 43 MMHG — SIGNIFICANT CHANGE UP (ref 35–48)
PCO2 BLDA: 44 MMHG — SIGNIFICANT CHANGE UP (ref 35–48)
PCO2 BLDA: 44 MMHG — SIGNIFICANT CHANGE UP (ref 35–48)
PCO2 BLDA: 49 MMHG — HIGH (ref 35–48)
PCO2 BLDA: 49 MMHG — HIGH (ref 35–48)
PCO2 BLDA: 55 MMHG — HIGH (ref 35–48)
PCO2 BLDV: 44 MMHG — SIGNIFICANT CHANGE UP (ref 42–55)
PCO2 BLDV: 47 MMHG — SIGNIFICANT CHANGE UP (ref 42–55)
PCO2 BLDV: 48 MMHG — SIGNIFICANT CHANGE UP (ref 42–55)
PCO2 BLDV: 52 MMHG — SIGNIFICANT CHANGE UP (ref 42–55)
PCO2 BLDV: 52 MMHG — SIGNIFICANT CHANGE UP (ref 42–55)
PH BLDA: 7.12 — CRITICAL LOW (ref 7.35–7.45)
PH BLDA: 7.13 — CRITICAL LOW (ref 7.35–7.45)
PH BLDA: 7.19 — CRITICAL LOW (ref 7.35–7.45)
PH BLDA: 7.24 — LOW (ref 7.35–7.45)
PH BLDA: 7.24 — LOW (ref 7.35–7.45)
PH BLDA: 7.25 — LOW (ref 7.35–7.45)
PH BLDA: 7.26 — LOW (ref 7.35–7.45)
PH BLDA: 7.28 — LOW (ref 7.35–7.45)
PH BLDA: 7.3 — LOW (ref 7.35–7.45)
PH BLDA: 7.31 — LOW (ref 7.35–7.45)
PH BLDA: 7.31 — LOW (ref 7.35–7.45)
PH BLDA: 7.33 — LOW (ref 7.35–7.45)
PH BLDA: 7.34 — LOW (ref 7.35–7.45)
PH BLDA: 7.39 — SIGNIFICANT CHANGE UP (ref 7.35–7.45)
PH BLDA: 7.41 — SIGNIFICANT CHANGE UP (ref 7.35–7.45)
PH BLDV: 7.06 — LOW (ref 7.32–7.43)
PH BLDV: 7.19 — LOW (ref 7.32–7.43)
PH BLDV: 7.23 — LOW (ref 7.32–7.43)
PH BLDV: 7.29 — LOW (ref 7.32–7.43)
PH BLDV: 7.34 — SIGNIFICANT CHANGE UP (ref 7.32–7.43)
PH UR: 5 — SIGNIFICANT CHANGE UP (ref 5–8)
PHOSPHATE SERPL-MCNC: 11.4 MG/DL — SIGNIFICANT CHANGE UP (ref 2.5–4.5)
PHOSPHATE SERPL-MCNC: 13.5 MG/DL — SIGNIFICANT CHANGE UP (ref 2.5–4.5)
PHOSPHATE SERPL-MCNC: 14 MG/DL — SIGNIFICANT CHANGE UP (ref 2.5–4.5)
PHOSPHATE SERPL-MCNC: 2.5 MG/DL — SIGNIFICANT CHANGE UP (ref 2.5–4.5)
PHOSPHATE SERPL-MCNC: 4 MG/DL — SIGNIFICANT CHANGE UP (ref 2.5–4.5)
PHOSPHATE SERPL-MCNC: 4.1 MG/DL — SIGNIFICANT CHANGE UP (ref 2.5–4.5)
PHOSPHATE SERPL-MCNC: 4.4 MG/DL — SIGNIFICANT CHANGE UP (ref 2.5–4.5)
PHOSPHATE SERPL-MCNC: 4.6 MG/DL — HIGH (ref 2.5–4.5)
PHOSPHATE SERPL-MCNC: 4.9 MG/DL — HIGH (ref 2.5–4.5)
PHOSPHATE SERPL-MCNC: 5.1 MG/DL — HIGH (ref 2.5–4.5)
PHOSPHATE SERPL-MCNC: 5.6 MG/DL — HIGH (ref 2.5–4.5)
PHOSPHATE SERPL-MCNC: 5.9 MG/DL — HIGH (ref 2.5–4.5)
PHOSPHATE SERPL-MCNC: 6 MG/DL — HIGH (ref 2.5–4.5)
PHOSPHATE SERPL-MCNC: 6 MG/DL — HIGH (ref 2.5–4.5)
PHOSPHATE SERPL-MCNC: 6.1 MG/DL — HIGH (ref 2.5–4.5)
PHOSPHATE SERPL-MCNC: 6.4 MG/DL — HIGH (ref 2.5–4.5)
PHOSPHATE SERPL-MCNC: 6.5 MG/DL — HIGH (ref 2.5–4.5)
PHOSPHATE SERPL-MCNC: 6.5 MG/DL — HIGH (ref 2.5–4.5)
PHOSPHATE SERPL-MCNC: 6.6 MG/DL — HIGH (ref 2.5–4.5)
PHOSPHATE SERPL-MCNC: 6.6 MG/DL — HIGH (ref 2.5–4.5)
PHOSPHATE SERPL-MCNC: 7.9 MG/DL — HIGH (ref 2.5–4.5)
PHOSPHATE SERPL-MCNC: >9 MG/DL — HIGH (ref 2.5–4.5)
PLAT MORPH BLD: NORMAL — SIGNIFICANT CHANGE UP
PLATELET # BLD AUTO: 106 K/UL — LOW (ref 150–400)
PLATELET # BLD AUTO: 108 K/UL — LOW (ref 150–400)
PLATELET # BLD AUTO: 118 K/UL — LOW (ref 150–400)
PLATELET # BLD AUTO: 129 K/UL — LOW (ref 150–400)
PLATELET # BLD AUTO: 133 K/UL — LOW (ref 150–400)
PLATELET # BLD AUTO: 145 K/UL — LOW (ref 150–400)
PLATELET # BLD AUTO: 152 K/UL — SIGNIFICANT CHANGE UP (ref 150–400)
PLATELET # BLD AUTO: 186 K/UL — SIGNIFICANT CHANGE UP (ref 150–400)
PLATELET # BLD AUTO: 205 K/UL — SIGNIFICANT CHANGE UP (ref 150–400)
PLATELET # BLD AUTO: 244 K/UL — SIGNIFICANT CHANGE UP (ref 150–400)
PLATELET # BLD AUTO: 252 K/UL — SIGNIFICANT CHANGE UP (ref 150–400)
PLATELET # BLD AUTO: 257 K/UL — SIGNIFICANT CHANGE UP (ref 150–400)
PLATELET # BLD AUTO: 289 K/UL — SIGNIFICANT CHANGE UP (ref 150–400)
PLATELET # BLD AUTO: 43 K/UL — LOW (ref 150–400)
PLATELET # BLD AUTO: 49 K/UL — LOW (ref 150–400)
PLATELET # BLD AUTO: 50 K/UL — LOW (ref 150–400)
PLATELET # BLD AUTO: 53 K/UL — LOW (ref 150–400)
PLATELET # BLD AUTO: 56 K/UL — LOW (ref 150–400)
PLATELET # BLD AUTO: 65 K/UL — LOW (ref 150–400)
PLATELET # BLD AUTO: 88 K/UL — LOW (ref 150–400)
PLATELET # BLD AUTO: 92 K/UL — LOW (ref 150–400)
PLATELET # BLD AUTO: 92 K/UL — LOW (ref 150–400)
PLATELET # BLD AUTO: 97 K/UL — LOW (ref 150–400)
PLATELET CLUMP BLD QL SMEAR: SLIGHT
PLATELET COUNT - ESTIMATE: ABNORMAL
PLATELET COUNT - ESTIMATE: NORMAL — SIGNIFICANT CHANGE UP
PO2 BLDA: 107 MMHG — SIGNIFICANT CHANGE UP (ref 83–108)
PO2 BLDA: 109 MMHG — HIGH (ref 83–108)
PO2 BLDA: 117 MMHG — HIGH (ref 83–108)
PO2 BLDA: 134 MMHG — HIGH (ref 83–108)
PO2 BLDA: 134 MMHG — HIGH (ref 83–108)
PO2 BLDA: 138 MMHG — HIGH (ref 83–108)
PO2 BLDA: 146 MMHG — HIGH (ref 83–108)
PO2 BLDA: 165 MMHG — HIGH (ref 83–108)
PO2 BLDA: 236 MMHG — HIGH (ref 83–108)
PO2 BLDA: 74 MMHG — LOW (ref 83–108)
PO2 BLDA: 76 MMHG — LOW (ref 83–108)
PO2 BLDA: 77 MMHG — LOW (ref 83–108)
PO2 BLDA: 79 MMHG — LOW (ref 83–108)
PO2 BLDA: 83 MMHG — SIGNIFICANT CHANGE UP (ref 83–108)
PO2 BLDA: 86 MMHG — SIGNIFICANT CHANGE UP (ref 83–108)
PO2 BLDA: 89 MMHG — SIGNIFICANT CHANGE UP (ref 83–108)
PO2 BLDA: 89 MMHG — SIGNIFICANT CHANGE UP (ref 83–108)
PO2 BLDA: 90 MMHG — SIGNIFICANT CHANGE UP (ref 83–108)
PO2 BLDA: 98 MMHG — SIGNIFICANT CHANGE UP (ref 83–108)
PO2 BLDV: 35 MMHG — SIGNIFICANT CHANGE UP
PO2 BLDV: 36 MMHG — SIGNIFICANT CHANGE UP
PO2 BLDV: 49 MMHG — SIGNIFICANT CHANGE UP
PO2 BLDV: 50 MMHG — SIGNIFICANT CHANGE UP
PO2 BLDV: 70 MMHG — SIGNIFICANT CHANGE UP
POIKILOCYTOSIS BLD QL AUTO: SLIGHT — SIGNIFICANT CHANGE UP
POIKILOCYTOSIS BLD QL AUTO: SLIGHT — SIGNIFICANT CHANGE UP
POTASSIUM BLDV-SCNC: 4.6 MMOL/L — SIGNIFICANT CHANGE UP (ref 3.5–5.1)
POTASSIUM BLDV-SCNC: 5.9 MMOL/L — HIGH (ref 3.5–5.1)
POTASSIUM SERPL-MCNC: 4.1 MMOL/L — SIGNIFICANT CHANGE UP (ref 3.5–5.3)
POTASSIUM SERPL-MCNC: 4.2 MMOL/L — SIGNIFICANT CHANGE UP (ref 3.5–5.3)
POTASSIUM SERPL-MCNC: 4.4 MMOL/L — SIGNIFICANT CHANGE UP (ref 3.5–5.3)
POTASSIUM SERPL-MCNC: 4.5 MMOL/L — SIGNIFICANT CHANGE UP (ref 3.5–5.3)
POTASSIUM SERPL-MCNC: 4.7 MMOL/L — SIGNIFICANT CHANGE UP (ref 3.5–5.3)
POTASSIUM SERPL-MCNC: 4.9 MMOL/L — SIGNIFICANT CHANGE UP (ref 3.5–5.3)
POTASSIUM SERPL-MCNC: 5 MMOL/L — SIGNIFICANT CHANGE UP (ref 3.5–5.3)
POTASSIUM SERPL-MCNC: 5.1 MMOL/L — SIGNIFICANT CHANGE UP (ref 3.5–5.3)
POTASSIUM SERPL-MCNC: 5.1 MMOL/L — SIGNIFICANT CHANGE UP (ref 3.5–5.3)
POTASSIUM SERPL-MCNC: 5.2 MMOL/L — SIGNIFICANT CHANGE UP (ref 3.5–5.3)
POTASSIUM SERPL-MCNC: 5.2 MMOL/L — SIGNIFICANT CHANGE UP (ref 3.5–5.3)
POTASSIUM SERPL-MCNC: 5.4 MMOL/L — HIGH (ref 3.5–5.3)
POTASSIUM SERPL-MCNC: 5.5 MMOL/L — HIGH (ref 3.5–5.3)
POTASSIUM SERPL-MCNC: 5.6 MMOL/L — HIGH (ref 3.5–5.3)
POTASSIUM SERPL-MCNC: 5.7 MMOL/L — HIGH (ref 3.5–5.3)
POTASSIUM SERPL-MCNC: 5.9 MMOL/L — HIGH (ref 3.5–5.3)
POTASSIUM SERPL-MCNC: 6 MMOL/L — HIGH (ref 3.5–5.3)
POTASSIUM SERPL-MCNC: 6 MMOL/L — HIGH (ref 3.5–5.3)
POTASSIUM SERPL-MCNC: 6.1 MMOL/L — HIGH (ref 3.5–5.3)
POTASSIUM SERPL-MCNC: 6.2 MMOL/L — CRITICAL HIGH (ref 3.5–5.3)
POTASSIUM SERPL-MCNC: 6.2 MMOL/L — CRITICAL HIGH (ref 3.5–5.3)
POTASSIUM SERPL-MCNC: 6.4 MMOL/L — CRITICAL HIGH (ref 3.5–5.3)
POTASSIUM SERPL-MCNC: 6.5 MMOL/L — CRITICAL HIGH (ref 3.5–5.3)
POTASSIUM SERPL-MCNC: 7 MMOL/L — CRITICAL HIGH (ref 3.5–5.3)
POTASSIUM SERPL-MCNC: 7.4 MMOL/L — CRITICAL HIGH (ref 3.5–5.3)
POTASSIUM SERPL-MCNC: 7.4 MMOL/L — CRITICAL HIGH (ref 3.5–5.3)
POTASSIUM SERPL-SCNC: 4.1 MMOL/L — SIGNIFICANT CHANGE UP (ref 3.5–5.3)
POTASSIUM SERPL-SCNC: 4.2 MMOL/L — SIGNIFICANT CHANGE UP (ref 3.5–5.3)
POTASSIUM SERPL-SCNC: 4.4 MMOL/L — SIGNIFICANT CHANGE UP (ref 3.5–5.3)
POTASSIUM SERPL-SCNC: 4.5 MMOL/L — SIGNIFICANT CHANGE UP (ref 3.5–5.3)
POTASSIUM SERPL-SCNC: 4.7 MMOL/L — SIGNIFICANT CHANGE UP (ref 3.5–5.3)
POTASSIUM SERPL-SCNC: 4.9 MMOL/L — SIGNIFICANT CHANGE UP (ref 3.5–5.3)
POTASSIUM SERPL-SCNC: 5 MMOL/L — SIGNIFICANT CHANGE UP (ref 3.5–5.3)
POTASSIUM SERPL-SCNC: 5.1 MMOL/L — SIGNIFICANT CHANGE UP (ref 3.5–5.3)
POTASSIUM SERPL-SCNC: 5.1 MMOL/L — SIGNIFICANT CHANGE UP (ref 3.5–5.3)
POTASSIUM SERPL-SCNC: 5.2 MMOL/L — SIGNIFICANT CHANGE UP (ref 3.5–5.3)
POTASSIUM SERPL-SCNC: 5.2 MMOL/L — SIGNIFICANT CHANGE UP (ref 3.5–5.3)
POTASSIUM SERPL-SCNC: 5.3 MMOL/L
POTASSIUM SERPL-SCNC: 5.4 MMOL/L — HIGH (ref 3.5–5.3)
POTASSIUM SERPL-SCNC: 5.5 MMOL/L — HIGH (ref 3.5–5.3)
POTASSIUM SERPL-SCNC: 5.6 MMOL/L — HIGH (ref 3.5–5.3)
POTASSIUM SERPL-SCNC: 5.7 MMOL/L — HIGH (ref 3.5–5.3)
POTASSIUM SERPL-SCNC: 5.9 MMOL/L — HIGH (ref 3.5–5.3)
POTASSIUM SERPL-SCNC: 6 MMOL/L — HIGH (ref 3.5–5.3)
POTASSIUM SERPL-SCNC: 6 MMOL/L — HIGH (ref 3.5–5.3)
POTASSIUM SERPL-SCNC: 6.1 MMOL/L — HIGH (ref 3.5–5.3)
POTASSIUM SERPL-SCNC: 6.2 MMOL/L — CRITICAL HIGH (ref 3.5–5.3)
POTASSIUM SERPL-SCNC: 6.2 MMOL/L — CRITICAL HIGH (ref 3.5–5.3)
POTASSIUM SERPL-SCNC: 6.4 MMOL/L — CRITICAL HIGH (ref 3.5–5.3)
POTASSIUM SERPL-SCNC: 6.5 MMOL/L — CRITICAL HIGH (ref 3.5–5.3)
POTASSIUM SERPL-SCNC: 7 MMOL/L — CRITICAL HIGH (ref 3.5–5.3)
POTASSIUM SERPL-SCNC: 7.4 MMOL/L — CRITICAL HIGH (ref 3.5–5.3)
POTASSIUM SERPL-SCNC: 7.4 MMOL/L — CRITICAL HIGH (ref 3.5–5.3)
POTASSIUM UR-SCNC: 54 MMOL/L — SIGNIFICANT CHANGE UP
PROT ?TM UR-MCNC: 117 MG/DL — HIGH (ref 0–12)
PROT SERPL-MCNC: 4.1 G/DL — LOW (ref 6–8.3)
PROT SERPL-MCNC: 4.3 G/DL — LOW (ref 6–8.3)
PROT SERPL-MCNC: 4.3 G/DL — LOW (ref 6–8.3)
PROT SERPL-MCNC: 5 G/DL — LOW (ref 6–8.3)
PROT SERPL-MCNC: 5 G/DL — LOW (ref 6–8.3)
PROT SERPL-MCNC: 5.1 G/DL — LOW (ref 6–8.3)
PROT SERPL-MCNC: 5.2 G/DL — LOW (ref 6–8.3)
PROT SERPL-MCNC: 5.3 G/DL — LOW (ref 6–8.3)
PROT SERPL-MCNC: 5.4 G/DL — LOW (ref 6–8.3)
PROT SERPL-MCNC: 5.5 G/DL — LOW (ref 6–8.3)
PROT SERPL-MCNC: 5.6 G/DL — LOW (ref 6–8.3)
PROT SERPL-MCNC: 5.7 G/DL — LOW (ref 6–8.3)
PROT SERPL-MCNC: 6.2 G/DL — SIGNIFICANT CHANGE UP (ref 6–8.3)
PROT SERPL-MCNC: 7.5 G/DL
PROT SERPL-MCNC: 9.3 G/DL — HIGH (ref 6–8.3)
PROT UR-MCNC: 15 MG/DL
PROTHROM AB SERPL-ACNC: 13.6 SEC — HIGH (ref 10.5–13.4)
PROTHROM AB SERPL-ACNC: 14.1 SEC — HIGH (ref 10.5–13.4)
PROTHROM AB SERPL-ACNC: 15.9 SEC — HIGH (ref 10.5–13.4)
PROTHROM AB SERPL-ACNC: 15.9 SEC — HIGH (ref 10.5–13.4)
PROTHROM AB SERPL-ACNC: 17.2 SEC — HIGH (ref 10.5–13.4)
PROTHROM AB SERPL-ACNC: 18.3 SEC — HIGH (ref 10.5–13.4)
PROTHROM AB SERPL-ACNC: 18.6 SEC — HIGH (ref 10.5–13.4)
PROTHROM AB SERPL-ACNC: 20.2 SEC — HIGH (ref 10.5–13.4)
PROTHROM AB SERPL-ACNC: 20.6 SEC — HIGH (ref 10.5–13.4)
PSA FREE FLD-MCNC: 21 %
PSA FREE SERPL-MCNC: 1.43 NG/ML
PSA SERPL-MCNC: 6.77 NG/ML
RBC # BLD: 2.81 M/UL — LOW (ref 4.2–5.8)
RBC # BLD: 2.88 M/UL — LOW (ref 4.2–5.8)
RBC # BLD: 3.14 M/UL — LOW (ref 4.2–5.8)
RBC # BLD: 3.16 M/UL — LOW (ref 4.2–5.8)
RBC # BLD: 3.23 M/UL — LOW (ref 4.2–5.8)
RBC # BLD: 3.23 M/UL — LOW (ref 4.2–5.8)
RBC # BLD: 3.25 M/UL — LOW (ref 4.2–5.8)
RBC # BLD: 3.28 M/UL — LOW (ref 4.2–5.8)
RBC # BLD: 3.47 M/UL — LOW (ref 4.2–5.8)
RBC # BLD: 3.53 M/UL — LOW (ref 4.2–5.8)
RBC # BLD: 3.58 M/UL — LOW (ref 4.2–5.8)
RBC # BLD: 3.66 M/UL — LOW (ref 4.2–5.8)
RBC # BLD: 4.25 M/UL — SIGNIFICANT CHANGE UP (ref 4.2–5.8)
RBC # BLD: 4.28 M/UL — SIGNIFICANT CHANGE UP (ref 4.2–5.8)
RBC # BLD: 4.52 M/UL — SIGNIFICANT CHANGE UP (ref 4.2–5.8)
RBC # BLD: 4.99 M/UL — SIGNIFICANT CHANGE UP (ref 4.2–5.8)
RBC # BLD: 5.02 M/UL — SIGNIFICANT CHANGE UP (ref 4.2–5.8)
RBC # BLD: 5.14 M/UL — SIGNIFICANT CHANGE UP (ref 4.2–5.8)
RBC # BLD: 5.19 M/UL — SIGNIFICANT CHANGE UP (ref 4.2–5.8)
RBC # BLD: 5.23 M/UL — SIGNIFICANT CHANGE UP (ref 4.2–5.8)
RBC # BLD: 5.31 M/UL — SIGNIFICANT CHANGE UP (ref 4.2–5.8)
RBC # BLD: 5.52 M/UL — SIGNIFICANT CHANGE UP (ref 4.2–5.8)
RBC # BLD: 5.57 M/UL — SIGNIFICANT CHANGE UP (ref 4.2–5.8)
RBC # FLD: 13.5 % — SIGNIFICANT CHANGE UP (ref 10.3–14.5)
RBC # FLD: 13.6 % — SIGNIFICANT CHANGE UP (ref 10.3–14.5)
RBC # FLD: 13.6 % — SIGNIFICANT CHANGE UP (ref 10.3–14.5)
RBC # FLD: 13.9 % — SIGNIFICANT CHANGE UP (ref 10.3–14.5)
RBC # FLD: 14.1 % — SIGNIFICANT CHANGE UP (ref 10.3–14.5)
RBC # FLD: 14.2 % — SIGNIFICANT CHANGE UP (ref 10.3–14.5)
RBC # FLD: 14.3 % — SIGNIFICANT CHANGE UP (ref 10.3–14.5)
RBC # FLD: 14.3 % — SIGNIFICANT CHANGE UP (ref 10.3–14.5)
RBC # FLD: 14.5 % — SIGNIFICANT CHANGE UP (ref 10.3–14.5)
RBC # FLD: 14.5 % — SIGNIFICANT CHANGE UP (ref 10.3–14.5)
RBC # FLD: 14.6 % — HIGH (ref 10.3–14.5)
RBC # FLD: 14.7 % — HIGH (ref 10.3–14.5)
RBC # FLD: 14.8 % — HIGH (ref 10.3–14.5)
RBC # FLD: 14.8 % — HIGH (ref 10.3–14.5)
RBC # FLD: 14.9 % — HIGH (ref 10.3–14.5)
RBC # FLD: 15.1 % — HIGH (ref 10.3–14.5)
RBC # FLD: 15.2 % — HIGH (ref 10.3–14.5)
RBC # FLD: 15.3 % — HIGH (ref 10.3–14.5)
RBC # FLD: 15.3 % — HIGH (ref 10.3–14.5)
RBC # FLD: 15.5 % — HIGH (ref 10.3–14.5)
RBC # FLD: 15.6 % — HIGH (ref 10.3–14.5)
RBC # FLD: 15.9 % — HIGH (ref 10.3–14.5)
RBC # FLD: 16.1 % — HIGH (ref 10.3–14.5)
RBC BLD AUTO: ABNORMAL
RBC BLD AUTO: NORMAL — SIGNIFICANT CHANGE UP
RETICS #: 52.2 K/UL — SIGNIFICANT CHANGE UP (ref 25–125)
RETICS/RBC NFR: 1.2 % — SIGNIFICANT CHANGE UP (ref 0.5–2.5)
S AUREUS DNA NOSE QL NAA+PROBE: DETECTED
SAO2 % BLDA: 100 % — SIGNIFICANT CHANGE UP
SAO2 % BLDA: 95 % — SIGNIFICANT CHANGE UP
SAO2 % BLDA: 96 % — SIGNIFICANT CHANGE UP
SAO2 % BLDA: 96 % — SIGNIFICANT CHANGE UP
SAO2 % BLDA: 97 % — SIGNIFICANT CHANGE UP
SAO2 % BLDA: 98 % — SIGNIFICANT CHANGE UP
SAO2 % BLDA: 99 % — SIGNIFICANT CHANGE UP
SAO2 % BLDV: 45.2 % — SIGNIFICANT CHANGE UP
SAO2 % BLDV: 81 % — SIGNIFICANT CHANGE UP
SAO2 % BLDV: 82.4 % — SIGNIFICANT CHANGE UP
SAO2 % BLDV: 92 % — SIGNIFICANT CHANGE UP
SAO2 % BLDV: SIGNIFICANT CHANGE UP %
SMUDGE CELLS # BLD: PRESENT — SIGNIFICANT CHANGE UP
SODIUM SERPL-SCNC: 133 MMOL/L — LOW (ref 135–145)
SODIUM SERPL-SCNC: 135 MMOL/L — SIGNIFICANT CHANGE UP (ref 135–145)
SODIUM SERPL-SCNC: 136 MMOL/L — SIGNIFICANT CHANGE UP (ref 135–145)
SODIUM SERPL-SCNC: 137 MMOL/L — SIGNIFICANT CHANGE UP (ref 135–145)
SODIUM SERPL-SCNC: 139 MMOL/L — SIGNIFICANT CHANGE UP (ref 135–145)
SODIUM SERPL-SCNC: 140 MMOL/L — SIGNIFICANT CHANGE UP (ref 135–145)
SODIUM SERPL-SCNC: 141 MMOL/L — SIGNIFICANT CHANGE UP (ref 135–145)
SODIUM SERPL-SCNC: 142 MMOL/L
SODIUM SERPL-SCNC: 142 MMOL/L — SIGNIFICANT CHANGE UP (ref 135–145)
SODIUM SERPL-SCNC: 143 MMOL/L — SIGNIFICANT CHANGE UP (ref 135–145)
SODIUM SERPL-SCNC: 144 MMOL/L — SIGNIFICANT CHANGE UP (ref 135–145)
SODIUM SERPL-SCNC: 145 MMOL/L — SIGNIFICANT CHANGE UP (ref 135–145)
SODIUM SERPL-SCNC: 145 MMOL/L — SIGNIFICANT CHANGE UP (ref 135–145)
SODIUM SERPL-SCNC: 147 MMOL/L — HIGH (ref 135–145)
SODIUM SERPL-SCNC: 147 MMOL/L — HIGH (ref 135–145)
SODIUM UR-SCNC: 58 MMOL/L — SIGNIFICANT CHANGE UP
SP GR SPEC: 1.02 — SIGNIFICANT CHANGE UP (ref 1.01–1.02)
SPECIMEN SOURCE: SIGNIFICANT CHANGE UP
STOMATOCYTES BLD QL SMEAR: SLIGHT — SIGNIFICANT CHANGE UP
SURGICAL PATHOLOGY STUDY: SIGNIFICANT CHANGE UP
TRIGL SERPL-MCNC: 113 MG/DL
TRIGL SERPL-MCNC: 172 MG/DL — HIGH
TRIGL SERPL-MCNC: 851 MG/DL — HIGH
TROPONIN I, HIGH SENSITIVITY RESULT: 180.6 NG/L — HIGH
TROPONIN I, HIGH SENSITIVITY RESULT: 289.5 NG/L — HIGH
TROPONIN I, HIGH SENSITIVITY RESULT: 3.8 NG/L — SIGNIFICANT CHANGE UP
TROPONIN I, HIGH SENSITIVITY RESULT: 314 NG/L — HIGH
TROPONIN I, HIGH SENSITIVITY RESULT: 347.1 NG/L — HIGH
TROPONIN I, HIGH SENSITIVITY RESULT: 356.4 NG/L — HIGH
UROBILINOGEN FLD QL: NEGATIVE — SIGNIFICANT CHANGE UP
VARIANT LYMPHS # BLD: 1 % — SIGNIFICANT CHANGE UP (ref 0–6)
VARIANT LYMPHS # BLD: 3 % — SIGNIFICANT CHANGE UP (ref 0–6)
VARIANT LYMPHS # BLD: 4 % — SIGNIFICANT CHANGE UP (ref 0–6)
VARIANT LYMPHS # BLD: 6 % — SIGNIFICANT CHANGE UP (ref 0–6)
WBC # BLD: 11 K/UL — HIGH (ref 3.8–10.5)
WBC # BLD: 11.43 K/UL — HIGH (ref 3.8–10.5)
WBC # BLD: 11.69 K/UL — HIGH (ref 3.8–10.5)
WBC # BLD: 12.22 K/UL — HIGH (ref 3.8–10.5)
WBC # BLD: 12.78 K/UL — HIGH (ref 3.8–10.5)
WBC # BLD: 13.51 K/UL — HIGH (ref 3.8–10.5)
WBC # BLD: 13.56 K/UL — HIGH (ref 3.8–10.5)
WBC # BLD: 13.62 K/UL — HIGH (ref 3.8–10.5)
WBC # BLD: 13.96 K/UL — HIGH (ref 3.8–10.5)
WBC # BLD: 15.65 K/UL — HIGH (ref 3.8–10.5)
WBC # BLD: 16.98 K/UL — HIGH (ref 3.8–10.5)
WBC # BLD: 17.61 K/UL — HIGH (ref 3.8–10.5)
WBC # BLD: 2.49 K/UL — LOW (ref 3.8–10.5)
WBC # BLD: 22.82 K/UL — HIGH (ref 3.8–10.5)
WBC # BLD: 24 K/UL — HIGH (ref 3.8–10.5)
WBC # BLD: 24.99 K/UL — HIGH (ref 3.8–10.5)
WBC # BLD: 4.18 K/UL — SIGNIFICANT CHANGE UP (ref 3.8–10.5)
WBC # BLD: 5.28 K/UL — SIGNIFICANT CHANGE UP (ref 3.8–10.5)
WBC # BLD: 7.58 K/UL — SIGNIFICANT CHANGE UP (ref 3.8–10.5)
WBC # BLD: 9.16 K/UL — SIGNIFICANT CHANGE UP (ref 3.8–10.5)
WBC # BLD: 9.38 K/UL — SIGNIFICANT CHANGE UP (ref 3.8–10.5)
WBC # BLD: 9.65 K/UL — SIGNIFICANT CHANGE UP (ref 3.8–10.5)
WBC # BLD: 9.93 K/UL — SIGNIFICANT CHANGE UP (ref 3.8–10.5)
WBC # FLD AUTO: 11 K/UL — HIGH (ref 3.8–10.5)
WBC # FLD AUTO: 11.43 K/UL — HIGH (ref 3.8–10.5)
WBC # FLD AUTO: 11.69 K/UL — HIGH (ref 3.8–10.5)
WBC # FLD AUTO: 12.22 K/UL — HIGH (ref 3.8–10.5)
WBC # FLD AUTO: 12.78 K/UL — HIGH (ref 3.8–10.5)
WBC # FLD AUTO: 13.51 K/UL — HIGH (ref 3.8–10.5)
WBC # FLD AUTO: 13.56 K/UL — HIGH (ref 3.8–10.5)
WBC # FLD AUTO: 13.62 K/UL — HIGH (ref 3.8–10.5)
WBC # FLD AUTO: 13.96 K/UL — HIGH (ref 3.8–10.5)
WBC # FLD AUTO: 15.65 K/UL — HIGH (ref 3.8–10.5)
WBC # FLD AUTO: 16.98 K/UL — HIGH (ref 3.8–10.5)
WBC # FLD AUTO: 17.61 K/UL — HIGH (ref 3.8–10.5)
WBC # FLD AUTO: 2.49 K/UL — LOW (ref 3.8–10.5)
WBC # FLD AUTO: 22.82 K/UL — HIGH (ref 3.8–10.5)
WBC # FLD AUTO: 24 K/UL — HIGH (ref 3.8–10.5)
WBC # FLD AUTO: 24.99 K/UL — HIGH (ref 3.8–10.5)
WBC # FLD AUTO: 4.18 K/UL — SIGNIFICANT CHANGE UP (ref 3.8–10.5)
WBC # FLD AUTO: 5.28 K/UL — SIGNIFICANT CHANGE UP (ref 3.8–10.5)
WBC # FLD AUTO: 7.58 K/UL — SIGNIFICANT CHANGE UP (ref 3.8–10.5)
WBC # FLD AUTO: 9.16 K/UL — SIGNIFICANT CHANGE UP (ref 3.8–10.5)
WBC # FLD AUTO: 9.38 K/UL — SIGNIFICANT CHANGE UP (ref 3.8–10.5)
WBC # FLD AUTO: 9.65 K/UL — SIGNIFICANT CHANGE UP (ref 3.8–10.5)
WBC # FLD AUTO: 9.93 K/UL — SIGNIFICANT CHANGE UP (ref 3.8–10.5)

## 2023-01-01 PROCEDURE — 84156 ASSAY OF PROTEIN URINE: CPT

## 2023-01-01 PROCEDURE — 71045 X-RAY EXAM CHEST 1 VIEW: CPT | Mod: 26

## 2023-01-01 PROCEDURE — 85025 COMPLETE CBC W/AUTO DIFF WBC: CPT

## 2023-01-01 PROCEDURE — 80061 LIPID PANEL: CPT

## 2023-01-01 PROCEDURE — 99291 CRITICAL CARE FIRST HOUR: CPT

## 2023-01-01 PROCEDURE — 44121 REMOVAL OF SMALL INTESTINE: CPT | Mod: AS,59

## 2023-01-01 PROCEDURE — 95816 EEG AWAKE AND DROWSY: CPT | Mod: 26

## 2023-01-01 PROCEDURE — P9045: CPT

## 2023-01-01 PROCEDURE — 87077 CULTURE AEROBIC IDENTIFY: CPT

## 2023-01-01 PROCEDURE — 36415 COLL VENOUS BLD VENIPUNCTURE: CPT

## 2023-01-01 PROCEDURE — 93970 EXTREMITY STUDY: CPT

## 2023-01-01 PROCEDURE — 85610 PROTHROMBIN TIME: CPT

## 2023-01-01 PROCEDURE — 87340 HEPATITIS B SURFACE AG IA: CPT

## 2023-01-01 PROCEDURE — 74177 CT ABD & PELVIS W/CONTRAST: CPT

## 2023-01-01 PROCEDURE — 83615 LACTATE (LD) (LDH) ENZYME: CPT

## 2023-01-01 PROCEDURE — 93010 ELECTROCARDIOGRAM REPORT: CPT

## 2023-01-01 PROCEDURE — 99261: CPT

## 2023-01-01 PROCEDURE — 96374 THER/PROPH/DIAG INJ IV PUSH: CPT

## 2023-01-01 PROCEDURE — 95957 EEG DIGITAL ANALYSIS: CPT

## 2023-01-01 PROCEDURE — 82330 ASSAY OF CALCIUM: CPT

## 2023-01-01 PROCEDURE — 84132 ASSAY OF SERUM POTASSIUM: CPT

## 2023-01-01 PROCEDURE — 84484 ASSAY OF TROPONIN QUANT: CPT

## 2023-01-01 PROCEDURE — 99214 OFFICE O/P EST MOD 30 MIN: CPT

## 2023-01-01 PROCEDURE — 84100 ASSAY OF PHOSPHORUS: CPT

## 2023-01-01 PROCEDURE — 85049 AUTOMATED PLATELET COUNT: CPT

## 2023-01-01 PROCEDURE — 86850 RBC ANTIBODY SCREEN: CPT

## 2023-01-01 PROCEDURE — 94003 VENT MGMT INPAT SUBQ DAY: CPT

## 2023-01-01 PROCEDURE — 85045 AUTOMATED RETICULOCYTE COUNT: CPT

## 2023-01-01 PROCEDURE — 96375 TX/PRO/DX INJ NEW DRUG ADDON: CPT

## 2023-01-01 PROCEDURE — 87186 SC STD MICRODIL/AGAR DIL: CPT

## 2023-01-01 PROCEDURE — 83690 ASSAY OF LIPASE: CPT

## 2023-01-01 PROCEDURE — 93306 TTE W/DOPPLER COMPLETE: CPT | Mod: 26

## 2023-01-01 PROCEDURE — 82553 CREATINE MB FRACTION: CPT

## 2023-01-01 PROCEDURE — 44310 ILEOSTOMY/JEJUNOSTOMY: CPT | Mod: AS

## 2023-01-01 PROCEDURE — 83605 ASSAY OF LACTIC ACID: CPT

## 2023-01-01 PROCEDURE — 86901 BLOOD TYPING SEROLOGIC RH(D): CPT

## 2023-01-01 PROCEDURE — P9047: CPT

## 2023-01-01 PROCEDURE — P9100: CPT

## 2023-01-01 PROCEDURE — 36430 TRANSFUSION BLD/BLD COMPNT: CPT

## 2023-01-01 PROCEDURE — 44120 REMOVAL OF SMALL INTESTINE: CPT | Mod: AS

## 2023-01-01 PROCEDURE — 99285 EMERGENCY DEPT VISIT HI MDM: CPT

## 2023-01-01 PROCEDURE — 74177 CT ABD & PELVIS W/CONTRAST: CPT | Mod: 26

## 2023-01-01 PROCEDURE — 87086 URINE CULTURE/COLONY COUNT: CPT

## 2023-01-01 PROCEDURE — 85027 COMPLETE CBC AUTOMATED: CPT

## 2023-01-01 PROCEDURE — 83010 ASSAY OF HAPTOGLOBIN QUANT: CPT

## 2023-01-01 PROCEDURE — 99292 CRITICAL CARE ADDL 30 MIN: CPT | Mod: GC

## 2023-01-01 PROCEDURE — 93970 EXTREMITY STUDY: CPT | Mod: 26

## 2023-01-01 PROCEDURE — 74019 RADEX ABDOMEN 2 VIEWS: CPT | Mod: 26

## 2023-01-01 PROCEDURE — 95816 EEG AWAKE AND DROWSY: CPT

## 2023-01-01 PROCEDURE — 87040 BLOOD CULTURE FOR BACTERIA: CPT

## 2023-01-01 PROCEDURE — 94640 AIRWAY INHALATION TREATMENT: CPT

## 2023-01-01 PROCEDURE — 80048 BASIC METABOLIC PNL TOTAL CA: CPT

## 2023-01-01 PROCEDURE — 86900 BLOOD TYPING SEROLOGIC ABO: CPT

## 2023-01-01 PROCEDURE — 88307 TISSUE EXAM BY PATHOLOGIST: CPT | Mod: 26

## 2023-01-01 PROCEDURE — 82550 ASSAY OF CK (CPK): CPT

## 2023-01-01 PROCEDURE — 44310 ILEOSTOMY/JEJUNOSTOMY: CPT

## 2023-01-01 PROCEDURE — 84133 ASSAY OF URINE POTASSIUM: CPT

## 2023-01-01 PROCEDURE — 71045 X-RAY EXAM CHEST 1 VIEW: CPT

## 2023-01-01 PROCEDURE — 44121 REMOVAL OF SMALL INTESTINE: CPT

## 2023-01-01 PROCEDURE — 76857 US EXAM PELVIC LIMITED: CPT | Mod: 26

## 2023-01-01 PROCEDURE — 71045 X-RAY EXAM CHEST 1 VIEW: CPT | Mod: 26,77

## 2023-01-01 PROCEDURE — 87641 MR-STAPH DNA AMP PROBE: CPT

## 2023-01-01 PROCEDURE — 71045 X-RAY EXAM CHEST 1 VIEW: CPT | Mod: 26,76

## 2023-01-01 PROCEDURE — 84300 ASSAY OF URINE SODIUM: CPT

## 2023-01-01 PROCEDURE — 74019 RADEX ABDOMEN 2 VIEWS: CPT

## 2023-01-01 PROCEDURE — 70450 CT HEAD/BRAIN W/O DYE: CPT | Mod: 26

## 2023-01-01 PROCEDURE — 93005 ELECTROCARDIOGRAM TRACING: CPT

## 2023-01-01 PROCEDURE — 82955 ASSAY OF G6PD ENZYME: CPT

## 2023-01-01 PROCEDURE — 99222 1ST HOSP IP/OBS MODERATE 55: CPT

## 2023-01-01 PROCEDURE — 80053 COMPREHEN METABOLIC PANEL: CPT

## 2023-01-01 PROCEDURE — 85730 THROMBOPLASTIN TIME PARTIAL: CPT

## 2023-01-01 PROCEDURE — 44120 REMOVAL OF SMALL INTESTINE: CPT

## 2023-01-01 PROCEDURE — 74177 CT ABD & PELVIS W/CONTRAST: CPT | Mod: 26,MA

## 2023-01-01 PROCEDURE — 70450 CT HEAD/BRAIN W/O DYE: CPT

## 2023-01-01 PROCEDURE — 85384 FIBRINOGEN ACTIVITY: CPT

## 2023-01-01 PROCEDURE — 85379 FIBRIN DEGRADATION QUANT: CPT

## 2023-01-01 PROCEDURE — 99223 1ST HOSP IP/OBS HIGH 75: CPT | Mod: 57

## 2023-01-01 PROCEDURE — 82570 ASSAY OF URINE CREATININE: CPT

## 2023-01-01 PROCEDURE — 82140 ASSAY OF AMMONIA: CPT

## 2023-01-01 PROCEDURE — 93306 TTE W/DOPPLER COMPLETE: CPT

## 2023-01-01 PROCEDURE — 82962 GLUCOSE BLOOD TEST: CPT

## 2023-01-01 PROCEDURE — 83735 ASSAY OF MAGNESIUM: CPT

## 2023-01-01 PROCEDURE — C1889: CPT

## 2023-01-01 PROCEDURE — 88307 TISSUE EXAM BY PATHOLOGIST: CPT

## 2023-01-01 PROCEDURE — P9037: CPT

## 2023-01-01 PROCEDURE — 82803 BLOOD GASES ANY COMBINATION: CPT

## 2023-01-01 PROCEDURE — 84295 ASSAY OF SERUM SODIUM: CPT

## 2023-01-01 PROCEDURE — 87640 STAPH A DNA AMP PROBE: CPT

## 2023-01-01 PROCEDURE — 84478 ASSAY OF TRIGLYCERIDES: CPT

## 2023-01-01 PROCEDURE — 76857 US EXAM PELVIC LIMITED: CPT

## 2023-01-01 PROCEDURE — 81001 URINALYSIS AUTO W/SCOPE: CPT

## 2023-01-01 DEVICE — STAPLER COVIDIEN TA 90 BLUE: Type: IMPLANTABLE DEVICE | Status: FUNCTIONAL

## 2023-01-01 DEVICE — STAPLER COVIDIEN ENDO GIA 80-3.8MM BLUE RELOAD: Type: IMPLANTABLE DEVICE | Status: FUNCTIONAL

## 2023-01-01 DEVICE — STAPLER COVIDIEN ENDO GIA 80-3.8MM BLUE: Type: IMPLANTABLE DEVICE | Status: FUNCTIONAL

## 2023-01-01 DEVICE — STAPLER COVIDIEN TA 90 BLUE RELOAD: Type: IMPLANTABLE DEVICE | Status: FUNCTIONAL

## 2023-01-01 DEVICE — SURGICEL POWDER 3 GRAMS: Type: IMPLANTABLE DEVICE | Status: FUNCTIONAL

## 2023-01-01 RX ORDER — FLUTICASONE PROPIONATE 50 UG/1
50 SPRAY, METERED NASAL DAILY
Qty: 1 | Refills: 3 | Status: ACTIVE | COMMUNITY
Start: 2019-09-23 | End: 1900-01-01

## 2023-01-01 RX ORDER — EPINEPHRINE 0.3 MG/.3ML
0.1 INJECTION INTRAMUSCULAR; SUBCUTANEOUS
Qty: 8 | Refills: 0 | Status: DISCONTINUED | OUTPATIENT
Start: 2023-01-01 | End: 2023-01-01

## 2023-01-01 RX ORDER — BUMETANIDE 0.25 MG/ML
2 INJECTION INTRAMUSCULAR; INTRAVENOUS ONCE
Refills: 0 | Status: COMPLETED | OUTPATIENT
Start: 2023-01-01 | End: 2023-01-01

## 2023-01-01 RX ORDER — ENOXAPARIN SODIUM 100 MG/ML
40 INJECTION SUBCUTANEOUS EVERY 24 HOURS
Refills: 0 | Status: DISCONTINUED | OUTPATIENT
Start: 2023-01-01 | End: 2023-01-01

## 2023-01-01 RX ORDER — FENTANYL CITRATE 50 UG/ML
50 INJECTION INTRAVENOUS EVERY 4 HOURS
Refills: 0 | Status: DISCONTINUED | OUTPATIENT
Start: 2023-01-01 | End: 2023-01-01

## 2023-01-01 RX ORDER — CALCIUM GLUCONATE 100 MG/ML
1 VIAL (ML) INTRAVENOUS ONCE
Refills: 0 | Status: COMPLETED | OUTPATIENT
Start: 2023-01-01 | End: 2023-01-01

## 2023-01-01 RX ORDER — VASOPRESSIN 20 [USP'U]/ML
0.04 INJECTION INTRAVENOUS
Qty: 40 | Refills: 0 | Status: DISCONTINUED | OUTPATIENT
Start: 2023-01-01 | End: 2023-01-01

## 2023-01-01 RX ORDER — ALBUTEROL 90 UG/1
2 AEROSOL, METERED ORAL ONCE
Refills: 0 | Status: COMPLETED | OUTPATIENT
Start: 2023-01-01 | End: 2023-01-01

## 2023-01-01 RX ORDER — INSULIN HUMAN 100 [IU]/ML
5 INJECTION, SOLUTION SUBCUTANEOUS ONCE
Refills: 0 | Status: COMPLETED | OUTPATIENT
Start: 2023-01-01 | End: 2023-01-01

## 2023-01-01 RX ORDER — NOREPINEPHRINE BITARTRATE/D5W 8 MG/250ML
2.4 PLASTIC BAG, INJECTION (ML) INTRAVENOUS
Qty: 32 | Refills: 0 | Status: DISCONTINUED | OUTPATIENT
Start: 2023-01-01 | End: 2023-01-01

## 2023-01-01 RX ORDER — CALCIUM GLUCONATE 100 MG/ML
2 VIAL (ML) INTRAVENOUS ONCE
Refills: 0 | Status: DISCONTINUED | OUTPATIENT
Start: 2023-01-01 | End: 2023-01-01

## 2023-01-01 RX ORDER — CALCIUM GLUCONATE 100 MG/ML
2 VIAL (ML) INTRAVENOUS ONCE
Refills: 0 | Status: COMPLETED | OUTPATIENT
Start: 2023-01-01 | End: 2023-01-01

## 2023-01-01 RX ORDER — SODIUM BICARBONATE 1 MEQ/ML
0.06 SYRINGE (ML) INTRAVENOUS
Qty: 50 | Refills: 0 | Status: DISCONTINUED | OUTPATIENT
Start: 2023-01-01 | End: 2023-01-01

## 2023-01-01 RX ORDER — INSULIN HUMAN 100 [IU]/ML
10 INJECTION, SOLUTION SUBCUTANEOUS ONCE
Refills: 0 | Status: DISCONTINUED | OUTPATIENT
Start: 2023-01-01 | End: 2023-01-01

## 2023-01-01 RX ORDER — SODIUM BICARBONATE 1 MEQ/ML
100 SYRINGE (ML) INTRAVENOUS ONCE
Refills: 0 | Status: COMPLETED | OUTPATIENT
Start: 2023-01-01 | End: 2023-01-01

## 2023-01-01 RX ORDER — IPRATROPIUM/ALBUTEROL SULFATE 18-103MCG
3 AEROSOL WITH ADAPTER (GRAM) INHALATION ONCE
Refills: 0 | Status: COMPLETED | OUTPATIENT
Start: 2023-01-01 | End: 2023-01-01

## 2023-01-01 RX ORDER — INSULIN HUMAN 100 [IU]/ML
10 INJECTION, SOLUTION SUBCUTANEOUS ONCE
Refills: 0 | Status: COMPLETED | OUTPATIENT
Start: 2023-01-01 | End: 2023-01-01

## 2023-01-01 RX ORDER — NOREPINEPHRINE BITARTRATE/D5W 8 MG/250ML
1.5 PLASTIC BAG, INJECTION (ML) INTRAVENOUS
Qty: 16 | Refills: 0 | Status: DISCONTINUED | OUTPATIENT
Start: 2023-01-01 | End: 2023-01-01

## 2023-01-01 RX ORDER — ALBUMIN HUMAN 25 %
50 VIAL (ML) INTRAVENOUS EVERY 8 HOURS
Refills: 0 | Status: COMPLETED | OUTPATIENT
Start: 2023-01-01 | End: 2023-01-01

## 2023-01-01 RX ORDER — DEXTROSE 50 % IN WATER 50 %
50 SYRINGE (ML) INTRAVENOUS ONCE
Refills: 0 | Status: DISCONTINUED | OUTPATIENT
Start: 2023-01-01 | End: 2023-01-01

## 2023-01-01 RX ORDER — AMLODIPINE BESYLATE 2.5 MG/1
0 TABLET ORAL
Qty: 0 | Refills: 0 | DISCHARGE

## 2023-01-01 RX ORDER — NOREPINEPHRINE BITARTRATE/D5W 8 MG/250ML
1.8 PLASTIC BAG, INJECTION (ML) INTRAVENOUS
Qty: 64 | Refills: 0 | Status: DISCONTINUED | OUTPATIENT
Start: 2023-01-01 | End: 2023-01-01

## 2023-01-01 RX ORDER — DEXTROSE 50 % IN WATER 50 %
50 SYRINGE (ML) INTRAVENOUS ONCE
Refills: 0 | Status: COMPLETED | OUTPATIENT
Start: 2023-01-01 | End: 2023-01-01

## 2023-01-01 RX ORDER — FENTANYL CITRATE 50 UG/ML
50 INJECTION INTRAVENOUS
Refills: 0 | Status: DISCONTINUED | OUTPATIENT
Start: 2023-01-01 | End: 2023-01-01

## 2023-01-01 RX ORDER — INSULIN LISPRO 100/ML
VIAL (ML) SUBCUTANEOUS EVERY 6 HOURS
Refills: 0 | Status: DISCONTINUED | OUTPATIENT
Start: 2023-01-01 | End: 2023-01-01

## 2023-01-01 RX ORDER — DEXTROSE 50 % IN WATER 50 %
25 SYRINGE (ML) INTRAVENOUS ONCE
Refills: 0 | Status: COMPLETED | OUTPATIENT
Start: 2023-01-01 | End: 2023-01-01

## 2023-01-01 RX ORDER — DESMOPRESSIN ACETATE 0.1 MG/1
24 TABLET ORAL ONCE
Refills: 0 | Status: COMPLETED | OUTPATIENT
Start: 2023-01-01 | End: 2023-01-01

## 2023-01-01 RX ORDER — ACETYLCYSTEINE 200 MG/ML
4.1 VIAL (ML) MISCELLANEOUS ONCE
Refills: 0 | Status: COMPLETED | OUTPATIENT
Start: 2023-01-01 | End: 2023-01-01

## 2023-01-01 RX ORDER — SODIUM CHLORIDE 9 MG/ML
1000 INJECTION, SOLUTION INTRAVENOUS ONCE
Refills: 0 | Status: COMPLETED | OUTPATIENT
Start: 2023-01-01 | End: 2023-01-01

## 2023-01-01 RX ORDER — ALBUMIN HUMAN 25 %
50 VIAL (ML) INTRAVENOUS EVERY 8 HOURS
Refills: 0 | Status: DISCONTINUED | OUTPATIENT
Start: 2023-01-01 | End: 2023-01-01

## 2023-01-01 RX ORDER — ALBUTEROL 90 UG/1
8 AEROSOL, METERED ORAL ONCE
Refills: 0 | Status: COMPLETED | OUTPATIENT
Start: 2023-01-01 | End: 2023-01-01

## 2023-01-01 RX ORDER — MIDAZOLAM HYDROCHLORIDE 1 MG/ML
0.04 INJECTION, SOLUTION INTRAMUSCULAR; INTRAVENOUS
Qty: 100 | Refills: 0 | Status: DISCONTINUED | OUTPATIENT
Start: 2023-01-01 | End: 2023-01-01

## 2023-01-01 RX ORDER — ALBUTEROL 90 UG/1
0 AEROSOL, METERED ORAL
Qty: 0 | Refills: 1 | DISCHARGE

## 2023-01-01 RX ORDER — CASPOFUNGIN ACETATE 7 MG/ML
70 INJECTION, POWDER, LYOPHILIZED, FOR SOLUTION INTRAVENOUS ONCE
Refills: 0 | Status: COMPLETED | OUTPATIENT
Start: 2023-01-01 | End: 2023-01-01

## 2023-01-01 RX ORDER — HEPARIN SODIUM 5000 [USP'U]/ML
5000 INJECTION INTRAVENOUS; SUBCUTANEOUS EVERY 8 HOURS
Refills: 0 | Status: DISCONTINUED | OUTPATIENT
Start: 2023-01-01 | End: 2023-01-01

## 2023-01-01 RX ORDER — SODIUM CHLORIDE 9 MG/ML
10 INJECTION INTRAMUSCULAR; INTRAVENOUS; SUBCUTANEOUS
Refills: 0 | Status: DISCONTINUED | OUTPATIENT
Start: 2023-01-01 | End: 2023-01-01

## 2023-01-01 RX ORDER — SODIUM CHLORIDE 9 MG/ML
1000 INJECTION INTRAMUSCULAR; INTRAVENOUS; SUBCUTANEOUS ONCE
Refills: 0 | Status: COMPLETED | OUTPATIENT
Start: 2023-01-01 | End: 2023-01-01

## 2023-01-01 RX ORDER — ACETYLCYSTEINE 200 MG/ML
12 VIAL (ML) MISCELLANEOUS ONCE
Refills: 0 | Status: COMPLETED | OUTPATIENT
Start: 2023-01-01 | End: 2023-01-01

## 2023-01-01 RX ORDER — ONDANSETRON 8 MG/1
4 TABLET, FILM COATED ORAL ONCE
Refills: 0 | Status: COMPLETED | OUTPATIENT
Start: 2023-01-01 | End: 2023-01-01

## 2023-01-01 RX ORDER — FENTANYL CITRATE 50 UG/ML
50 INJECTION INTRAVENOUS ONCE
Refills: 0 | Status: DISCONTINUED | OUTPATIENT
Start: 2023-01-01 | End: 2023-01-01

## 2023-01-01 RX ORDER — FLUTICASONE PROPIONATE 50 MCG
0 SPRAY, SUSPENSION NASAL
Qty: 0 | Refills: 2 | DISCHARGE

## 2023-01-01 RX ORDER — ALBUTEROL 90 UG/1
2 AEROSOL, METERED ORAL ONCE
Refills: 0 | Status: DISCONTINUED | OUTPATIENT
Start: 2023-01-01 | End: 2023-01-01

## 2023-01-01 RX ORDER — BUMETANIDE 0.25 MG/ML
1 INJECTION INTRAMUSCULAR; INTRAVENOUS
Qty: 20 | Refills: 0 | Status: DISCONTINUED | OUTPATIENT
Start: 2023-01-01 | End: 2023-01-01

## 2023-01-01 RX ORDER — MEROPENEM 1 G/30ML
1000 INJECTION INTRAVENOUS EVERY 24 HOURS
Refills: 0 | Status: DISCONTINUED | OUTPATIENT
Start: 2023-01-01 | End: 2023-01-01

## 2023-01-01 RX ORDER — SODIUM CHLORIDE 9 MG/ML
1000 INJECTION, SOLUTION INTRAVENOUS
Refills: 0 | Status: DISCONTINUED | OUTPATIENT
Start: 2023-01-01 | End: 2023-01-01

## 2023-01-01 RX ORDER — MIDAZOLAM HYDROCHLORIDE 1 MG/ML
4 INJECTION, SOLUTION INTRAMUSCULAR; INTRAVENOUS ONCE
Refills: 0 | Status: DISCONTINUED | OUTPATIENT
Start: 2023-01-01 | End: 2023-01-01

## 2023-01-01 RX ORDER — ACETYLCYSTEINE 200 MG/ML
8 VIAL (ML) MISCELLANEOUS ONCE
Refills: 0 | Status: DISCONTINUED | OUTPATIENT
Start: 2023-01-01 | End: 2023-01-01

## 2023-01-01 RX ORDER — SODIUM BICARBONATE 1 MEQ/ML
50 SYRINGE (ML) INTRAVENOUS ONCE
Refills: 0 | Status: COMPLETED | OUTPATIENT
Start: 2023-01-01 | End: 2023-01-01

## 2023-01-01 RX ORDER — MORPHINE SULFATE 50 MG/1
4 CAPSULE, EXTENDED RELEASE ORAL ONCE
Refills: 0 | Status: DISCONTINUED | OUTPATIENT
Start: 2023-01-01 | End: 2023-01-01

## 2023-01-01 RX ORDER — HYDROCORTISONE 20 MG
50 TABLET ORAL EVERY 6 HOURS
Refills: 0 | Status: DISCONTINUED | OUTPATIENT
Start: 2023-01-01 | End: 2023-01-01

## 2023-01-01 RX ORDER — SODIUM CHLORIDE 9 MG/ML
500 INJECTION, SOLUTION INTRAVENOUS ONCE
Refills: 0 | Status: COMPLETED | OUTPATIENT
Start: 2023-01-01 | End: 2023-01-01

## 2023-01-01 RX ORDER — ALBUTEROL 90 UG/1
5 AEROSOL, METERED ORAL ONCE
Refills: 0 | Status: COMPLETED | OUTPATIENT
Start: 2023-01-01 | End: 2023-01-01

## 2023-01-01 RX ORDER — BUMETANIDE 0.25 MG/ML
4 INJECTION INTRAMUSCULAR; INTRAVENOUS ONCE
Refills: 0 | Status: COMPLETED | OUTPATIENT
Start: 2023-01-01 | End: 2023-01-01

## 2023-01-01 RX ORDER — HEPARIN SODIUM 5000 [USP'U]/ML
3000 INJECTION INTRAVENOUS; SUBCUTANEOUS EVERY 6 HOURS
Refills: 0 | Status: DISCONTINUED | OUTPATIENT
Start: 2023-01-01 | End: 2023-01-01

## 2023-01-01 RX ORDER — SODIUM BICARBONATE 1 MEQ/ML
0.18 SYRINGE (ML) INTRAVENOUS
Qty: 150 | Refills: 0 | Status: DISCONTINUED | OUTPATIENT
Start: 2023-01-01 | End: 2023-01-01

## 2023-01-01 RX ORDER — NOREPINEPHRINE BITARTRATE/D5W 8 MG/250ML
0.9 PLASTIC BAG, INJECTION (ML) INTRAVENOUS
Qty: 64 | Refills: 0 | Status: DISCONTINUED | OUTPATIENT
Start: 2023-01-01 | End: 2023-01-01

## 2023-01-01 RX ORDER — HEPARIN SODIUM 5000 [USP'U]/ML
INJECTION INTRAVENOUS; SUBCUTANEOUS
Qty: 25000 | Refills: 0 | Status: DISCONTINUED | OUTPATIENT
Start: 2023-01-01 | End: 2023-01-01

## 2023-01-01 RX ORDER — MIDAZOLAM HYDROCHLORIDE 1 MG/ML
2 INJECTION, SOLUTION INTRAMUSCULAR; INTRAVENOUS ONCE
Refills: 0 | Status: DISCONTINUED | OUTPATIENT
Start: 2023-01-01 | End: 2023-01-01

## 2023-01-01 RX ORDER — MEROPENEM 1 G/30ML
500 INJECTION INTRAVENOUS EVERY 24 HOURS
Refills: 0 | Status: DISCONTINUED | OUTPATIENT
Start: 2023-01-01 | End: 2023-01-01

## 2023-01-01 RX ORDER — FENTANYL CITRATE 50 UG/ML
0.5 INJECTION INTRAVENOUS
Qty: 2500 | Refills: 0 | Status: DISCONTINUED | OUTPATIENT
Start: 2023-01-01 | End: 2023-01-01

## 2023-01-01 RX ORDER — ACETAMINOPHEN 500 MG
1000 TABLET ORAL ONCE
Refills: 0 | Status: COMPLETED | OUTPATIENT
Start: 2023-01-01 | End: 2023-01-01

## 2023-01-01 RX ORDER — HEPARIN SODIUM 5000 [USP'U]/ML
6500 INJECTION INTRAVENOUS; SUBCUTANEOUS EVERY 6 HOURS
Refills: 0 | Status: DISCONTINUED | OUTPATIENT
Start: 2023-01-01 | End: 2023-01-01

## 2023-01-01 RX ORDER — IOHEXOL 300 MG/ML
30 INJECTION, SOLUTION INTRAVENOUS ONCE
Refills: 0 | Status: DISCONTINUED | OUTPATIENT
Start: 2023-01-01 | End: 2023-01-01

## 2023-01-01 RX ORDER — ALBUTEROL 90 UG/1
8 AEROSOL, METERED ORAL ONCE
Refills: 0 | Status: DISCONTINUED | OUTPATIENT
Start: 2023-01-01 | End: 2023-01-01

## 2023-01-01 RX ORDER — ROBINUL 0.2 MG/ML
0.4 INJECTION INTRAMUSCULAR; INTRAVENOUS EVERY 8 HOURS
Refills: 0 | Status: DISCONTINUED | OUTPATIENT
Start: 2023-01-01 | End: 2023-01-01

## 2023-01-01 RX ORDER — DESMOPRESSIN ACETATE 0.1 MG/1
12 TABLET ORAL ONCE
Refills: 0 | Status: DISCONTINUED | OUTPATIENT
Start: 2023-01-01 | End: 2023-01-01

## 2023-01-01 RX ORDER — MIDAZOLAM HYDROCHLORIDE 1 MG/ML
2 INJECTION, SOLUTION INTRAMUSCULAR; INTRAVENOUS DAILY
Refills: 0 | Status: DISCONTINUED | OUTPATIENT
Start: 2023-01-01 | End: 2023-01-01

## 2023-01-01 RX ORDER — NOREPINEPHRINE BITARTRATE/D5W 8 MG/250ML
0.25 PLASTIC BAG, INJECTION (ML) INTRAVENOUS
Qty: 16 | Refills: 0 | Status: DISCONTINUED | OUTPATIENT
Start: 2023-01-01 | End: 2023-01-01

## 2023-01-01 RX ORDER — HYDROMORPHONE HYDROCHLORIDE 2 MG/ML
0.5 INJECTION INTRAMUSCULAR; INTRAVENOUS; SUBCUTANEOUS
Refills: 0 | Status: DISCONTINUED | OUTPATIENT
Start: 2023-01-01 | End: 2023-01-01

## 2023-01-01 RX ORDER — NOREPINEPHRINE BITARTRATE/D5W 8 MG/250ML
0.45 PLASTIC BAG, INJECTION (ML) INTRAVENOUS
Qty: 16 | Refills: 0 | Status: DISCONTINUED | OUTPATIENT
Start: 2023-01-01 | End: 2023-01-01

## 2023-01-01 RX ORDER — OLMESARTAN MEDOXOMIL 5 MG/1
0 TABLET, FILM COATED ORAL
Qty: 0 | Refills: 0 | DISCHARGE

## 2023-01-01 RX ORDER — MEROPENEM 1 G/30ML
500 INJECTION INTRAVENOUS EVERY 12 HOURS
Refills: 0 | Status: DISCONTINUED | OUTPATIENT
Start: 2023-01-01 | End: 2023-01-01

## 2023-01-01 RX ORDER — GLUCAGON INJECTION, SOLUTION 0.5 MG/.1ML
1 INJECTION, SOLUTION SUBCUTANEOUS ONCE
Refills: 0 | Status: DISCONTINUED | OUTPATIENT
Start: 2023-01-01 | End: 2023-01-01

## 2023-01-01 RX ORDER — MUPIROCIN 20 MG/G
1 OINTMENT TOPICAL
Refills: 0 | Status: COMPLETED | OUTPATIENT
Start: 2023-01-01 | End: 2023-01-01

## 2023-01-01 RX ORDER — ONDANSETRON 8 MG/1
4 TABLET, FILM COATED ORAL EVERY 6 HOURS
Refills: 0 | Status: DISCONTINUED | OUTPATIENT
Start: 2023-01-01 | End: 2023-01-01

## 2023-01-01 RX ORDER — SODIUM BICARBONATE 1 MEQ/ML
0.28 SYRINGE (ML) INTRAVENOUS
Qty: 150 | Refills: 0 | Status: DISCONTINUED | OUTPATIENT
Start: 2023-01-01 | End: 2023-01-01

## 2023-01-01 RX ORDER — ROSUVASTATIN CALCIUM 5 MG/1
0 TABLET ORAL
Qty: 0 | Refills: 0 | DISCHARGE

## 2023-01-01 RX ORDER — FENTANYL CITRATE 50 UG/ML
100 INJECTION INTRAVENOUS ONCE
Refills: 0 | Status: DISCONTINUED | OUTPATIENT
Start: 2023-01-01 | End: 2023-01-01

## 2023-01-01 RX ORDER — PROPOFOL 10 MG/ML
30.04 INJECTION, EMULSION INTRAVENOUS
Qty: 1000 | Refills: 0 | Status: DISCONTINUED | OUTPATIENT
Start: 2023-01-01 | End: 2023-01-01

## 2023-01-01 RX ORDER — FUROSEMIDE 40 MG
40 TABLET ORAL ONCE
Refills: 0 | Status: DISCONTINUED | OUTPATIENT
Start: 2023-01-01 | End: 2023-01-01

## 2023-01-01 RX ORDER — ALBUMIN HUMAN 25 %
250 VIAL (ML) INTRAVENOUS EVERY 12 HOURS
Refills: 0 | Status: COMPLETED | OUTPATIENT
Start: 2023-01-01 | End: 2023-01-01

## 2023-01-01 RX ORDER — PANTOPRAZOLE SODIUM 20 MG/1
40 TABLET, DELAYED RELEASE ORAL DAILY
Refills: 0 | Status: DISCONTINUED | OUTPATIENT
Start: 2023-01-01 | End: 2023-01-01

## 2023-01-01 RX ORDER — PROPOFOL 10 MG/ML
30 INJECTION, EMULSION INTRAVENOUS
Qty: 1000 | Refills: 0 | Status: DISCONTINUED | OUTPATIENT
Start: 2023-01-01 | End: 2023-01-01

## 2023-01-01 RX ORDER — CEFEPIME 1 G/1
1000 INJECTION, POWDER, FOR SOLUTION INTRAMUSCULAR; INTRAVENOUS EVERY 12 HOURS
Refills: 0 | Status: DISCONTINUED | OUTPATIENT
Start: 2023-01-01 | End: 2023-01-01

## 2023-01-01 RX ORDER — APIXABAN 2.5 MG/1
10 TABLET, FILM COATED ORAL EVERY 12 HOURS
Refills: 0 | Status: DISCONTINUED | OUTPATIENT
Start: 2023-01-01 | End: 2023-01-01

## 2023-01-01 RX ORDER — NOREPINEPHRINE BITARTRATE/D5W 8 MG/250ML
0.55 PLASTIC BAG, INJECTION (ML) INTRAVENOUS
Qty: 64 | Refills: 0 | Status: DISCONTINUED | OUTPATIENT
Start: 2023-01-01 | End: 2023-01-01

## 2023-01-01 RX ORDER — HYDROCORTISONE 1 %
12 CREAM (GRAM) TOPICAL TWICE DAILY
Qty: 1 | Refills: 1 | Status: DISCONTINUED | COMMUNITY
Start: 2019-12-23 | End: 2023-01-01

## 2023-01-01 RX ORDER — SODIUM BICARBONATE 1 MEQ/ML
100 SYRINGE (ML) INTRAVENOUS ONCE
Refills: 0 | Status: DISCONTINUED | OUTPATIENT
Start: 2023-01-01 | End: 2023-01-01

## 2023-01-01 RX ORDER — HYDROMORPHONE HYDROCHLORIDE 2 MG/ML
0.5 INJECTION INTRAMUSCULAR; INTRAVENOUS; SUBCUTANEOUS EVERY 4 HOURS
Refills: 0 | Status: DISCONTINUED | OUTPATIENT
Start: 2023-01-01 | End: 2023-01-01

## 2023-01-01 RX ORDER — METRONIDAZOLE 500 MG
500 TABLET ORAL EVERY 8 HOURS
Refills: 0 | Status: DISCONTINUED | OUTPATIENT
Start: 2023-01-01 | End: 2023-01-01

## 2023-01-01 RX ORDER — FUROSEMIDE 40 MG
80 TABLET ORAL ONCE
Refills: 0 | Status: COMPLETED | OUTPATIENT
Start: 2023-01-01 | End: 2023-01-01

## 2023-01-01 RX ORDER — NOREPINEPHRINE BITARTRATE/D5W 8 MG/250ML
1.5 PLASTIC BAG, INJECTION (ML) INTRAVENOUS
Qty: 32 | Refills: 0 | Status: DISCONTINUED | OUTPATIENT
Start: 2023-01-01 | End: 2023-01-01

## 2023-01-01 RX ORDER — POTASSIUM CHLORIDE 20 MEQ
10 PACKET (EA) ORAL ONCE
Refills: 0 | Status: DISCONTINUED | OUTPATIENT
Start: 2023-01-01 | End: 2023-01-01

## 2023-01-01 RX ORDER — CASPOFUNGIN ACETATE 7 MG/ML
INJECTION, POWDER, LYOPHILIZED, FOR SOLUTION INTRAVENOUS
Refills: 0 | Status: DISCONTINUED | OUTPATIENT
Start: 2023-01-01 | End: 2023-01-01

## 2023-01-01 RX ORDER — HEPARIN SODIUM 5000 [USP'U]/ML
5000 INJECTION INTRAVENOUS; SUBCUTANEOUS ONCE
Refills: 0 | Status: COMPLETED | OUTPATIENT
Start: 2023-01-01 | End: 2023-01-01

## 2023-01-01 RX ORDER — ALBUMIN HUMAN 25 %
100 VIAL (ML) INTRAVENOUS ONCE
Refills: 0 | Status: COMPLETED | OUTPATIENT
Start: 2023-01-01 | End: 2023-01-01

## 2023-01-01 RX ORDER — NOREPINEPHRINE BITARTRATE/D5W 8 MG/250ML
2.4 PLASTIC BAG, INJECTION (ML) INTRAVENOUS
Qty: 64 | Refills: 0 | Status: DISCONTINUED | OUTPATIENT
Start: 2023-01-01 | End: 2023-01-01

## 2023-01-01 RX ORDER — SODIUM CHLORIDE 9 MG/ML
1000 INJECTION INTRAMUSCULAR; INTRAVENOUS; SUBCUTANEOUS
Refills: 0 | Status: DISCONTINUED | OUTPATIENT
Start: 2023-01-01 | End: 2023-01-01

## 2023-01-01 RX ORDER — MAGNESIUM SULFATE 500 MG/ML
1 VIAL (ML) INJECTION ONCE
Refills: 0 | Status: COMPLETED | OUTPATIENT
Start: 2023-01-01 | End: 2023-01-01

## 2023-01-01 RX ORDER — NOREPINEPHRINE BITARTRATE/D5W 8 MG/250ML
1.8 PLASTIC BAG, INJECTION (ML) INTRAVENOUS
Qty: 32 | Refills: 0 | Status: DISCONTINUED | OUTPATIENT
Start: 2023-01-01 | End: 2023-01-01

## 2023-01-01 RX ORDER — DEXMEDETOMIDINE HYDROCHLORIDE IN 0.9% SODIUM CHLORIDE 4 UG/ML
0.2 INJECTION INTRAVENOUS
Qty: 200 | Refills: 0 | Status: DISCONTINUED | OUTPATIENT
Start: 2023-01-01 | End: 2023-01-01

## 2023-01-01 RX ORDER — MIDAZOLAM HYDROCHLORIDE 1 MG/ML
0.02 INJECTION, SOLUTION INTRAMUSCULAR; INTRAVENOUS
Qty: 100 | Refills: 0 | Status: DISCONTINUED | OUTPATIENT
Start: 2023-01-01 | End: 2023-01-01

## 2023-01-01 RX ORDER — DEXTROSE 50 % IN WATER 50 %
25 SYRINGE (ML) INTRAVENOUS ONCE
Refills: 0 | Status: DISCONTINUED | OUTPATIENT
Start: 2023-01-01 | End: 2023-01-01

## 2023-01-01 RX ORDER — FENTANYL CITRATE 50 UG/ML
1 INJECTION INTRAVENOUS
Qty: 2500 | Refills: 0 | Status: DISCONTINUED | OUTPATIENT
Start: 2023-01-01 | End: 2023-01-01

## 2023-01-01 RX ORDER — AMIODARONE HYDROCHLORIDE 400 MG/1
150 TABLET ORAL ONCE
Refills: 0 | Status: COMPLETED | OUTPATIENT
Start: 2023-01-01 | End: 2023-01-01

## 2023-01-01 RX ORDER — CASPOFUNGIN ACETATE 7 MG/ML
50 INJECTION, POWDER, LYOPHILIZED, FOR SOLUTION INTRAVENOUS EVERY 24 HOURS
Refills: 0 | Status: DISCONTINUED | OUTPATIENT
Start: 2023-01-01 | End: 2023-01-01

## 2023-01-01 RX ORDER — VANCOMYCIN HCL 1 G
1000 VIAL (EA) INTRAVENOUS ONCE
Refills: 0 | Status: COMPLETED | OUTPATIENT
Start: 2023-01-01 | End: 2023-01-01

## 2023-01-01 RX ORDER — CHLORHEXIDINE GLUCONATE 213 G/1000ML
1 SOLUTION TOPICAL
Refills: 0 | Status: DISCONTINUED | OUTPATIENT
Start: 2023-01-01 | End: 2023-01-01

## 2023-01-01 RX ORDER — DIATRIZOATE MEGLUMINE 180 MG/ML
30 INJECTION, SOLUTION INTRAVESICAL ONCE
Refills: 0 | Status: COMPLETED | OUTPATIENT
Start: 2023-01-01 | End: 2023-01-01

## 2023-01-01 RX ORDER — ALBUTEROL SULFATE 90 UG/1
108 (90 BASE) AEROSOL, METERED RESPIRATORY (INHALATION)
Qty: 1 | Refills: 3 | Status: ACTIVE | COMMUNITY
Start: 2022-03-04 | End: 1900-01-01

## 2023-01-01 RX ORDER — CHLORHEXIDINE GLUCONATE 213 G/1000ML
15 SOLUTION TOPICAL EVERY 12 HOURS
Refills: 0 | Status: DISCONTINUED | OUTPATIENT
Start: 2023-01-01 | End: 2023-01-01

## 2023-01-01 RX ORDER — ALBUTEROL 90 UG/1
4 AEROSOL, METERED ORAL ONCE
Refills: 0 | Status: COMPLETED | OUTPATIENT
Start: 2023-01-01 | End: 2023-01-01

## 2023-01-01 RX ORDER — HYDROMORPHONE HYDROCHLORIDE 2 MG/ML
1 INJECTION INTRAMUSCULAR; INTRAVENOUS; SUBCUTANEOUS EVERY 4 HOURS
Refills: 0 | Status: DISCONTINUED | OUTPATIENT
Start: 2023-01-01 | End: 2023-01-01

## 2023-01-01 RX ADMIN — PROPOFOL 14.6 MICROGRAM(S)/KG/MIN: 10 INJECTION, EMULSION INTRAVENOUS at 01:25

## 2023-01-01 RX ADMIN — FENTANYL CITRATE 100 MICROGRAM(S): 50 INJECTION INTRAVENOUS at 11:53

## 2023-01-01 RX ADMIN — CHLORHEXIDINE GLUCONATE 1 APPLICATION(S): 213 SOLUTION TOPICAL at 05:24

## 2023-01-01 RX ADMIN — PANTOPRAZOLE SODIUM 40 MILLIGRAM(S): 20 TABLET, DELAYED RELEASE ORAL at 11:38

## 2023-01-01 RX ADMIN — Medication 50 MILLIEQUIVALENT(S): at 22:16

## 2023-01-01 RX ADMIN — Medication 50 MILLIGRAM(S): at 15:12

## 2023-01-01 RX ADMIN — HEPARIN SODIUM 1700 UNIT(S)/HR: 5000 INJECTION INTRAVENOUS; SUBCUTANEOUS at 06:51

## 2023-01-01 RX ADMIN — Medication 34.2 MICROGRAM(S)/KG/MIN: at 21:07

## 2023-01-01 RX ADMIN — SODIUM CHLORIDE 100 MILLILITER(S): 9 INJECTION INTRAMUSCULAR; INTRAVENOUS; SUBCUTANEOUS at 22:22

## 2023-01-01 RX ADMIN — CHLORHEXIDINE GLUCONATE 15 MILLILITER(S): 213 SOLUTION TOPICAL at 05:02

## 2023-01-01 RX ADMIN — BUMETANIDE 2 MILLIGRAM(S): 0.25 INJECTION INTRAMUSCULAR; INTRAVENOUS at 09:47

## 2023-01-01 RX ADMIN — Medication 1000 MILLIGRAM(S): at 12:30

## 2023-01-01 RX ADMIN — Medication 19 MICROGRAM(S)/KG/MIN: at 20:16

## 2023-01-01 RX ADMIN — MUPIROCIN 1 APPLICATION(S): 20 OINTMENT TOPICAL at 05:10

## 2023-01-01 RX ADMIN — ALBUTEROL 5 PUFF(S): 90 AEROSOL, METERED ORAL at 20:24

## 2023-01-01 RX ADMIN — Medication 200 GRAM(S): at 06:13

## 2023-01-01 RX ADMIN — SODIUM CHLORIDE 100 MILLILITER(S): 9 INJECTION, SOLUTION INTRAVENOUS at 07:40

## 2023-01-01 RX ADMIN — CHLORHEXIDINE GLUCONATE 1 APPLICATION(S): 213 SOLUTION TOPICAL at 05:02

## 2023-01-01 RX ADMIN — Medication 50 MILLILITER(S): at 12:10

## 2023-01-01 RX ADMIN — Medication 1 DROP(S): at 17:19

## 2023-01-01 RX ADMIN — Medication 50 MILLIEQUIVALENT(S): at 13:02

## 2023-01-01 RX ADMIN — VASOPRESSIN 6 UNIT(S)/MIN: 20 INJECTION INTRAVENOUS at 21:47

## 2023-01-01 RX ADMIN — Medication 1: at 11:41

## 2023-01-01 RX ADMIN — VASOPRESSIN 6 UNIT(S)/MIN: 20 INJECTION INTRAVENOUS at 14:42

## 2023-01-01 RX ADMIN — ALBUTEROL 8 PUFF(S): 90 AEROSOL, METERED ORAL at 06:27

## 2023-01-01 RX ADMIN — Medication 50 MILLILITER(S): at 11:52

## 2023-01-01 RX ADMIN — HEPARIN SODIUM 1400 UNIT(S)/HR: 5000 INJECTION INTRAVENOUS; SUBCUTANEOUS at 13:50

## 2023-01-01 RX ADMIN — MIDAZOLAM HYDROCHLORIDE 2 MILLIGRAM(S): 1 INJECTION, SOLUTION INTRAMUSCULAR; INTRAVENOUS at 12:13

## 2023-01-01 RX ADMIN — Medication 3 MILLILITER(S): at 05:57

## 2023-01-01 RX ADMIN — FENTANYL CITRATE 100 MICROGRAM(S): 50 INJECTION INTRAVENOUS at 17:42

## 2023-01-01 RX ADMIN — Medication 34.2 MICROGRAM(S)/KG/MIN: at 15:42

## 2023-01-01 RX ADMIN — PROPOFOL 14.6 MICROGRAM(S)/KG/MIN: 10 INJECTION, EMULSION INTRAVENOUS at 10:27

## 2023-01-01 RX ADMIN — SODIUM CHLORIDE 100 MILLILITER(S): 9 INJECTION, SOLUTION INTRAVENOUS at 07:00

## 2023-01-01 RX ADMIN — Medication 200 GRAM(S): at 01:24

## 2023-01-01 RX ADMIN — Medication 150 MEQ/KG/HR: at 05:29

## 2023-01-01 RX ADMIN — INSULIN HUMAN 10 UNIT(S): 100 INJECTION, SOLUTION SUBCUTANEOUS at 00:16

## 2023-01-01 RX ADMIN — FENTANYL CITRATE 100 MICROGRAM(S): 50 INJECTION INTRAVENOUS at 19:42

## 2023-01-01 RX ADMIN — Medication 34.2 MICROGRAM(S)/KG/MIN: at 11:37

## 2023-01-01 RX ADMIN — CHLORHEXIDINE GLUCONATE 1 APPLICATION(S): 213 SOLUTION TOPICAL at 05:03

## 2023-01-01 RX ADMIN — Medication 50 MILLILITER(S): at 23:31

## 2023-01-01 RX ADMIN — SODIUM CHLORIDE 1000 MILLILITER(S): 9 INJECTION, SOLUTION INTRAVENOUS at 02:47

## 2023-01-01 RX ADMIN — SODIUM CHLORIDE 100 MILLILITER(S): 9 INJECTION, SOLUTION INTRAVENOUS at 19:23

## 2023-01-01 RX ADMIN — Medication 130.13 GRAM(S): at 10:06

## 2023-01-01 RX ADMIN — Medication 30 MILLILITER(S): at 05:57

## 2023-01-01 RX ADMIN — Medication 137 MICROGRAM(S)/KG/MIN: at 00:13

## 2023-01-01 RX ADMIN — VASOPRESSIN 6 UNIT(S)/MIN: 20 INJECTION INTRAVENOUS at 05:53

## 2023-01-01 RX ADMIN — Medication 200 GRAM(S): at 03:11

## 2023-01-01 RX ADMIN — PANTOPRAZOLE SODIUM 40 MILLIGRAM(S): 20 TABLET, DELAYED RELEASE ORAL at 12:13

## 2023-01-01 RX ADMIN — Medication 114 MICROGRAM(S)/KG/MIN: at 09:49

## 2023-01-01 RX ADMIN — PROPOFOL 14.6 MICROGRAM(S)/KG/MIN: 10 INJECTION, EMULSION INTRAVENOUS at 17:33

## 2023-01-01 RX ADMIN — Medication 25 MILLILITER(S): at 06:10

## 2023-01-01 RX ADMIN — Medication 100 MEQ/KG/HR: at 00:31

## 2023-01-01 RX ADMIN — PROPOFOL 14.6 MICROGRAM(S)/KG/MIN: 10 INJECTION, EMULSION INTRAVENOUS at 14:16

## 2023-01-01 RX ADMIN — Medication 50 MILLILITER(S): at 21:08

## 2023-01-01 RX ADMIN — Medication 50 MILLIEQUIVALENT(S): at 00:33

## 2023-01-01 RX ADMIN — Medication 50 MILLIGRAM(S): at 09:39

## 2023-01-01 RX ADMIN — Medication 34.2 MICROGRAM(S)/KG/MIN: at 06:30

## 2023-01-01 RX ADMIN — INSULIN HUMAN 5 UNIT(S): 100 INJECTION, SOLUTION SUBCUTANEOUS at 21:48

## 2023-01-01 RX ADMIN — APIXABAN 10 MILLIGRAM(S): 2.5 TABLET, FILM COATED ORAL at 05:35

## 2023-01-01 RX ADMIN — CEFEPIME 100 MILLIGRAM(S): 1 INJECTION, POWDER, FOR SOLUTION INTRAMUSCULAR; INTRAVENOUS at 05:23

## 2023-01-01 RX ADMIN — MORPHINE SULFATE 4 MILLIGRAM(S): 50 CAPSULE, EXTENDED RELEASE ORAL at 03:29

## 2023-01-01 RX ADMIN — Medication 182 MICROGRAM(S)/KG/MIN: at 20:59

## 2023-01-01 RX ADMIN — Medication 1 DROP(S): at 11:32

## 2023-01-01 RX ADMIN — FENTANYL CITRATE 50 MICROGRAM(S): 50 INJECTION INTRAVENOUS at 11:32

## 2023-01-01 RX ADMIN — INSULIN HUMAN 10 UNIT(S): 100 INJECTION, SOLUTION SUBCUTANEOUS at 18:42

## 2023-01-01 RX ADMIN — VASOPRESSIN 6 UNIT(S)/MIN: 20 INJECTION INTRAVENOUS at 14:58

## 2023-01-01 RX ADMIN — Medication 50 MILLILITER(S): at 23:21

## 2023-01-01 RX ADMIN — Medication 125 MILLILITER(S): at 21:37

## 2023-01-01 RX ADMIN — Medication 1 DROP(S): at 17:04

## 2023-01-01 RX ADMIN — MEROPENEM 100 MILLIGRAM(S): 1 INJECTION INTRAVENOUS at 10:58

## 2023-01-01 RX ADMIN — Medication 25 MILLILITER(S): at 20:24

## 2023-01-01 RX ADMIN — MUPIROCIN 1 APPLICATION(S): 20 OINTMENT TOPICAL at 05:09

## 2023-01-01 RX ADMIN — CASPOFUNGIN ACETATE 260 MILLIGRAM(S): 7 INJECTION, POWDER, LYOPHILIZED, FOR SOLUTION INTRAVENOUS at 10:25

## 2023-01-01 RX ADMIN — HEPARIN SODIUM 5000 UNIT(S): 5000 INJECTION INTRAVENOUS; SUBCUTANEOUS at 13:09

## 2023-01-01 RX ADMIN — MIDAZOLAM HYDROCHLORIDE 4 MILLIGRAM(S): 1 INJECTION, SOLUTION INTRAMUSCULAR; INTRAVENOUS at 18:54

## 2023-01-01 RX ADMIN — HEPARIN SODIUM 1800 UNIT(S)/HR: 5000 INJECTION INTRAVENOUS; SUBCUTANEOUS at 07:15

## 2023-01-01 RX ADMIN — Medication 1: at 11:38

## 2023-01-01 RX ADMIN — SODIUM CHLORIDE 1000 MILLILITER(S): 9 INJECTION, SOLUTION INTRAVENOUS at 10:37

## 2023-01-01 RX ADMIN — CHLORHEXIDINE GLUCONATE 15 MILLILITER(S): 213 SOLUTION TOPICAL at 05:09

## 2023-01-01 RX ADMIN — HEPARIN SODIUM 1500 UNIT(S)/HR: 5000 INJECTION INTRAVENOUS; SUBCUTANEOUS at 10:23

## 2023-01-01 RX ADMIN — PANTOPRAZOLE SODIUM 40 MILLIGRAM(S): 20 TABLET, DELAYED RELEASE ORAL at 11:14

## 2023-01-01 RX ADMIN — Medication 50 MILLILITER(S): at 05:08

## 2023-01-01 RX ADMIN — Medication 100 MILLIGRAM(S): at 19:24

## 2023-01-01 RX ADMIN — BUMETANIDE 5 MG/HR: 0.25 INJECTION INTRAMUSCULAR; INTRAVENOUS at 09:13

## 2023-01-01 RX ADMIN — PANTOPRAZOLE SODIUM 40 MILLIGRAM(S): 20 TABLET, DELAYED RELEASE ORAL at 11:37

## 2023-01-01 RX ADMIN — Medication 1 DROP(S): at 05:29

## 2023-01-01 RX ADMIN — CEFEPIME 100 MILLIGRAM(S): 1 INJECTION, POWDER, FOR SOLUTION INTRAMUSCULAR; INTRAVENOUS at 19:04

## 2023-01-01 RX ADMIN — Medication 125 MILLILITER(S): at 10:17

## 2023-01-01 RX ADMIN — INSULIN HUMAN 10 UNIT(S): 100 INJECTION, SOLUTION SUBCUTANEOUS at 00:51

## 2023-01-01 RX ADMIN — Medication 50 MILLIGRAM(S): at 21:02

## 2023-01-01 RX ADMIN — Medication 50 MILLIGRAM(S): at 10:58

## 2023-01-01 RX ADMIN — HEPARIN SODIUM 2000 UNIT(S)/HR: 5000 INJECTION INTRAVENOUS; SUBCUTANEOUS at 14:30

## 2023-01-01 RX ADMIN — Medication 100 MILLIEQUIVALENT(S): at 23:09

## 2023-01-01 RX ADMIN — Medication 137 MICROGRAM(S)/KG/MIN: at 05:56

## 2023-01-01 RX ADMIN — MEROPENEM 100 MILLIGRAM(S): 1 INJECTION INTRAVENOUS at 20:16

## 2023-01-01 RX ADMIN — Medication 200 GRAM(S): at 20:24

## 2023-01-01 RX ADMIN — Medication 1 DROP(S): at 23:13

## 2023-01-01 RX ADMIN — Medication 100 GRAM(S): at 23:40

## 2023-01-01 RX ADMIN — MUPIROCIN 1 APPLICATION(S): 20 OINTMENT TOPICAL at 05:17

## 2023-01-01 RX ADMIN — Medication 100 GRAM(S): at 20:58

## 2023-01-01 RX ADMIN — INSULIN HUMAN 5 UNIT(S): 100 INJECTION, SOLUTION SUBCUTANEOUS at 01:26

## 2023-01-01 RX ADMIN — HEPARIN SODIUM 1800 UNIT(S)/HR: 5000 INJECTION INTRAVENOUS; SUBCUTANEOUS at 07:13

## 2023-01-01 RX ADMIN — HEPARIN SODIUM 1800 UNIT(S)/HR: 5000 INJECTION INTRAVENOUS; SUBCUTANEOUS at 01:09

## 2023-01-01 RX ADMIN — INSULIN HUMAN 10 UNIT(S): 100 INJECTION, SOLUTION SUBCUTANEOUS at 03:24

## 2023-01-01 RX ADMIN — SODIUM CHLORIDE 1000 MILLILITER(S): 9 INJECTION INTRAMUSCULAR; INTRAVENOUS; SUBCUTANEOUS at 02:59

## 2023-01-01 RX ADMIN — Medication 100 MILLIEQUIVALENT(S): at 14:58

## 2023-01-01 RX ADMIN — VASOPRESSIN 6 UNIT(S)/MIN: 20 INJECTION INTRAVENOUS at 03:36

## 2023-01-01 RX ADMIN — Medication 50 MILLILITER(S): at 23:39

## 2023-01-01 RX ADMIN — Medication 2: at 23:25

## 2023-01-01 RX ADMIN — INSULIN HUMAN 5 UNIT(S): 100 INJECTION, SOLUTION SUBCUTANEOUS at 20:58

## 2023-01-01 RX ADMIN — BUMETANIDE 5 MG/HR: 0.25 INJECTION INTRAMUSCULAR; INTRAVENOUS at 14:43

## 2023-01-01 RX ADMIN — CHLORHEXIDINE GLUCONATE 15 MILLILITER(S): 213 SOLUTION TOPICAL at 18:22

## 2023-01-01 RX ADMIN — Medication 200 GRAM(S): at 17:34

## 2023-01-01 RX ADMIN — Medication 137 MICROGRAM(S)/KG/MIN: at 02:02

## 2023-01-01 RX ADMIN — Medication 1 DROP(S): at 01:00

## 2023-01-01 RX ADMIN — Medication 50 MILLILITER(S): at 21:48

## 2023-01-01 RX ADMIN — INSULIN HUMAN 10 UNIT(S): 100 INJECTION, SOLUTION SUBCUTANEOUS at 05:39

## 2023-01-01 RX ADMIN — Medication 3: at 05:32

## 2023-01-01 RX ADMIN — Medication 34.2 MICROGRAM(S)/KG/MIN: at 10:58

## 2023-01-01 RX ADMIN — Medication 310 GRAM(S): at 09:39

## 2023-01-01 RX ADMIN — PROPOFOL 14.6 MICROGRAM(S)/KG/MIN: 10 INJECTION, EMULSION INTRAVENOUS at 19:05

## 2023-01-01 RX ADMIN — Medication 1 DROP(S): at 05:17

## 2023-01-01 RX ADMIN — HEPARIN SODIUM 1700 UNIT(S)/HR: 5000 INJECTION INTRAVENOUS; SUBCUTANEOUS at 07:19

## 2023-01-01 RX ADMIN — Medication 50 MILLIGRAM(S): at 10:08

## 2023-01-01 RX ADMIN — HYDROMORPHONE HYDROCHLORIDE 0.5 MILLIGRAM(S): 2 INJECTION INTRAMUSCULAR; INTRAVENOUS; SUBCUTANEOUS at 11:01

## 2023-01-01 RX ADMIN — INSULIN HUMAN 5 UNIT(S): 100 INJECTION, SOLUTION SUBCUTANEOUS at 20:00

## 2023-01-01 RX ADMIN — AMIODARONE HYDROCHLORIDE 600 MILLIGRAM(S): 400 TABLET ORAL at 01:54

## 2023-01-01 RX ADMIN — PANTOPRAZOLE SODIUM 40 MILLIGRAM(S): 20 TABLET, DELAYED RELEASE ORAL at 12:34

## 2023-01-01 RX ADMIN — Medication 200 GRAM(S): at 23:32

## 2023-01-01 RX ADMIN — CHLORHEXIDINE GLUCONATE 15 MILLILITER(S): 213 SOLUTION TOPICAL at 17:42

## 2023-01-01 RX ADMIN — MUPIROCIN 1 APPLICATION(S): 20 OINTMENT TOPICAL at 18:41

## 2023-01-01 RX ADMIN — Medication 50 MILLIGRAM(S): at 17:34

## 2023-01-01 RX ADMIN — BUMETANIDE 132 MILLIGRAM(S): 0.25 INJECTION INTRAMUSCULAR; INTRAVENOUS at 14:42

## 2023-01-01 RX ADMIN — CHLORHEXIDINE GLUCONATE 1 APPLICATION(S): 213 SOLUTION TOPICAL at 05:35

## 2023-01-01 RX ADMIN — HEPARIN SODIUM 1800 UNIT(S)/HR: 5000 INJECTION INTRAVENOUS; SUBCUTANEOUS at 01:00

## 2023-01-01 RX ADMIN — FENTANYL CITRATE 50 MICROGRAM(S): 50 INJECTION INTRAVENOUS at 11:51

## 2023-01-01 RX ADMIN — FENTANYL CITRATE 50 MICROGRAM(S): 50 INJECTION INTRAVENOUS at 11:27

## 2023-01-01 RX ADMIN — SODIUM CHLORIDE 500 MILLILITER(S): 9 INJECTION, SOLUTION INTRAVENOUS at 04:49

## 2023-01-01 RX ADMIN — VASOPRESSIN 6 UNIT(S)/MIN: 20 INJECTION INTRAVENOUS at 09:18

## 2023-01-01 RX ADMIN — CHLORHEXIDINE GLUCONATE 1 APPLICATION(S): 213 SOLUTION TOPICAL at 05:10

## 2023-01-01 RX ADMIN — Medication 1: at 01:07

## 2023-01-01 RX ADMIN — VASOPRESSIN 6 UNIT(S)/MIN: 20 INJECTION INTRAVENOUS at 05:11

## 2023-01-01 RX ADMIN — Medication 50 MILLILITER(S): at 21:11

## 2023-01-01 RX ADMIN — Medication 100 MILLIEQUIVALENT(S): at 19:29

## 2023-01-01 RX ADMIN — Medication 68.3 MICROGRAM(S)/KG/MIN: at 05:11

## 2023-01-01 RX ADMIN — HEPARIN SODIUM 1600 UNIT(S)/HR: 5000 INJECTION INTRAVENOUS; SUBCUTANEOUS at 03:58

## 2023-01-01 RX ADMIN — ALBUTEROL 4 PUFF(S): 90 AEROSOL, METERED ORAL at 00:19

## 2023-01-01 RX ADMIN — HEPARIN SODIUM 0 UNIT(S)/HR: 5000 INJECTION INTRAVENOUS; SUBCUTANEOUS at 12:47

## 2023-01-01 RX ADMIN — ONDANSETRON 4 MILLIGRAM(S): 8 TABLET, FILM COATED ORAL at 03:00

## 2023-01-01 RX ADMIN — FENTANYL CITRATE 8.1 MICROGRAM(S)/KG/HR: 50 INJECTION INTRAVENOUS at 09:51

## 2023-01-01 RX ADMIN — MUPIROCIN 1 APPLICATION(S): 20 OINTMENT TOPICAL at 05:03

## 2023-01-01 RX ADMIN — Medication 25 MILLILITER(S): at 01:27

## 2023-01-01 RX ADMIN — MIDAZOLAM HYDROCHLORIDE 1.62 MG/KG/HR: 1 INJECTION, SOLUTION INTRAMUSCULAR; INTRAVENOUS at 12:18

## 2023-01-01 RX ADMIN — INSULIN HUMAN 5 UNIT(S): 100 INJECTION, SOLUTION SUBCUTANEOUS at 23:30

## 2023-01-01 RX ADMIN — HEPARIN SODIUM 1400 UNIT(S)/HR: 5000 INJECTION INTRAVENOUS; SUBCUTANEOUS at 13:52

## 2023-01-01 RX ADMIN — MEROPENEM 100 MILLIGRAM(S): 1 INJECTION INTRAVENOUS at 21:11

## 2023-01-01 RX ADMIN — MEROPENEM 100 MILLIGRAM(S): 1 INJECTION INTRAVENOUS at 11:38

## 2023-01-01 RX ADMIN — Medication 150 MEQ/KG/HR: at 01:03

## 2023-01-01 RX ADMIN — CHLORHEXIDINE GLUCONATE 15 MILLILITER(S): 213 SOLUTION TOPICAL at 05:24

## 2023-01-01 RX ADMIN — VASOPRESSIN 6 UNIT(S)/MIN: 20 INJECTION INTRAVENOUS at 18:46

## 2023-01-01 RX ADMIN — HEPARIN SODIUM 0 UNIT(S)/HR: 5000 INJECTION INTRAVENOUS; SUBCUTANEOUS at 23:59

## 2023-01-01 RX ADMIN — Medication 50 MILLIGRAM(S): at 17:36

## 2023-01-01 RX ADMIN — PROPOFOL 14.6 MICROGRAM(S)/KG/MIN: 10 INJECTION, EMULSION INTRAVENOUS at 09:49

## 2023-01-01 RX ADMIN — Medication 200 GRAM(S): at 18:49

## 2023-01-01 RX ADMIN — Medication 80 MILLIGRAM(S): at 10:26

## 2023-01-01 RX ADMIN — MEROPENEM 100 MILLIGRAM(S): 1 INJECTION INTRAVENOUS at 21:21

## 2023-01-01 RX ADMIN — CHLORHEXIDINE GLUCONATE 1 APPLICATION(S): 213 SOLUTION TOPICAL at 05:29

## 2023-01-01 RX ADMIN — Medication 50 MILLILITER(S): at 20:58

## 2023-01-01 RX ADMIN — HEPARIN SODIUM 5000 UNIT(S): 5000 INJECTION INTRAVENOUS; SUBCUTANEOUS at 18:43

## 2023-01-01 RX ADMIN — INSULIN HUMAN 5 UNIT(S): 100 INJECTION, SOLUTION SUBCUTANEOUS at 20:24

## 2023-01-01 RX ADMIN — Medication 3.8 MICROGRAM(S)/KG/MIN: at 17:34

## 2023-01-01 RX ADMIN — VASOPRESSIN 6 UNIT(S)/MIN: 20 INJECTION INTRAVENOUS at 18:01

## 2023-01-01 RX ADMIN — Medication 50 MILLILITER(S): at 12:47

## 2023-01-01 RX ADMIN — FENTANYL CITRATE 100 MICROGRAM(S): 50 INJECTION INTRAVENOUS at 19:04

## 2023-01-01 RX ADMIN — Medication 200 GRAM(S): at 06:17

## 2023-01-01 RX ADMIN — Medication 200 GRAM(S): at 12:47

## 2023-01-01 RX ADMIN — MEROPENEM 100 MILLIGRAM(S): 1 INJECTION INTRAVENOUS at 10:26

## 2023-01-01 RX ADMIN — INSULIN HUMAN 5 UNIT(S): 100 INJECTION, SOLUTION SUBCUTANEOUS at 02:51

## 2023-01-01 RX ADMIN — Medication 250 MILLIGRAM(S): at 12:08

## 2023-01-01 RX ADMIN — Medication 41.8 MICROGRAM(S)/KG/MIN: at 18:37

## 2023-01-01 RX ADMIN — ALBUTEROL 2 PUFF(S): 90 AEROSOL, METERED ORAL at 23:28

## 2023-01-01 RX ADMIN — Medication 50 MILLILITER(S): at 15:32

## 2023-01-01 RX ADMIN — Medication 3: at 23:13

## 2023-01-01 RX ADMIN — APIXABAN 10 MILLIGRAM(S): 2.5 TABLET, FILM COATED ORAL at 22:41

## 2023-01-01 RX ADMIN — MORPHINE SULFATE 4 MILLIGRAM(S): 50 CAPSULE, EXTENDED RELEASE ORAL at 02:59

## 2023-01-01 RX ADMIN — MUPIROCIN 1 APPLICATION(S): 20 OINTMENT TOPICAL at 05:35

## 2023-01-01 RX ADMIN — INSULIN HUMAN 10 UNIT(S): 100 INJECTION, SOLUTION SUBCUTANEOUS at 11:52

## 2023-01-01 RX ADMIN — HEPARIN SODIUM 1600 UNIT(S)/HR: 5000 INJECTION INTRAVENOUS; SUBCUTANEOUS at 10:41

## 2023-01-01 RX ADMIN — HEPARIN SODIUM 1600 UNIT(S)/HR: 5000 INJECTION INTRAVENOUS; SUBCUTANEOUS at 19:16

## 2023-01-01 RX ADMIN — Medication 114 MICROGRAM(S)/KG/MIN: at 19:23

## 2023-01-01 RX ADMIN — INSULIN HUMAN 10 UNIT(S): 100 INJECTION, SOLUTION SUBCUTANEOUS at 23:39

## 2023-01-01 RX ADMIN — Medication 50 MILLILITER(S): at 20:00

## 2023-01-01 RX ADMIN — PANTOPRAZOLE SODIUM 40 MILLIGRAM(S): 20 TABLET, DELAYED RELEASE ORAL at 11:42

## 2023-01-01 RX ADMIN — HEPARIN SODIUM 1600 UNIT(S)/HR: 5000 INJECTION INTRAVENOUS; SUBCUTANEOUS at 17:21

## 2023-01-01 RX ADMIN — PANTOPRAZOLE SODIUM 40 MILLIGRAM(S): 20 TABLET, DELAYED RELEASE ORAL at 11:32

## 2023-01-01 RX ADMIN — MIDAZOLAM HYDROCHLORIDE 2 MILLIGRAM(S): 1 INJECTION, SOLUTION INTRAMUSCULAR; INTRAVENOUS at 13:00

## 2023-01-01 RX ADMIN — Medication 25 MILLILITER(S): at 06:01

## 2023-01-01 RX ADMIN — Medication 1: at 17:03

## 2023-01-01 RX ADMIN — Medication 100 MILLIEQUIVALENT(S): at 19:23

## 2023-01-01 RX ADMIN — FENTANYL CITRATE 100 MICROGRAM(S): 50 INJECTION INTRAVENOUS at 18:41

## 2023-01-01 RX ADMIN — MUPIROCIN 1 APPLICATION(S): 20 OINTMENT TOPICAL at 19:16

## 2023-01-01 RX ADMIN — Medication 137 MICROGRAM(S)/KG/MIN: at 14:42

## 2023-01-01 RX ADMIN — HEPARIN SODIUM 3000 UNIT(S): 5000 INJECTION INTRAVENOUS; SUBCUTANEOUS at 04:03

## 2023-01-01 RX ADMIN — MUPIROCIN 1 APPLICATION(S): 20 OINTMENT TOPICAL at 18:46

## 2023-01-01 RX ADMIN — SODIUM CHLORIDE 1000 MILLILITER(S): 9 INJECTION, SOLUTION INTRAVENOUS at 12:07

## 2023-01-01 RX ADMIN — MUPIROCIN 1 APPLICATION(S): 20 OINTMENT TOPICAL at 18:22

## 2023-01-01 RX ADMIN — PROPOFOL 14.6 MICROGRAM(S)/KG/MIN: 10 INJECTION, EMULSION INTRAVENOUS at 00:31

## 2023-01-01 RX ADMIN — Medication 137 MICROGRAM(S)/KG/MIN: at 10:27

## 2023-01-01 RX ADMIN — VASOPRESSIN 6 UNIT(S)/MIN: 20 INJECTION INTRAVENOUS at 05:58

## 2023-01-01 RX ADMIN — Medication 1 DROP(S): at 06:35

## 2023-01-01 RX ADMIN — Medication 1: at 05:16

## 2023-01-01 RX ADMIN — MEROPENEM 100 MILLIGRAM(S): 1 INJECTION INTRAVENOUS at 09:16

## 2023-01-01 RX ADMIN — Medication 50 MILLILITER(S): at 05:10

## 2023-01-01 RX ADMIN — Medication 50 MILLIGRAM(S): at 05:34

## 2023-01-01 RX ADMIN — Medication 1 DROP(S): at 11:37

## 2023-01-01 RX ADMIN — Medication 100 MILLIGRAM(S): at 05:24

## 2023-01-01 RX ADMIN — FENTANYL CITRATE 50 MICROGRAM(S): 50 INJECTION INTRAVENOUS at 12:08

## 2023-01-01 RX ADMIN — Medication 50 MILLIGRAM(S): at 05:01

## 2023-01-01 RX ADMIN — PROPOFOL 14.6 MICROGRAM(S)/KG/MIN: 10 INJECTION, EMULSION INTRAVENOUS at 21:36

## 2023-01-01 RX ADMIN — Medication 114 MICROGRAM(S)/KG/MIN: at 01:25

## 2023-01-01 RX ADMIN — Medication 100 GRAM(S): at 09:39

## 2023-01-01 RX ADMIN — Medication 50 MILLIGRAM(S): at 23:13

## 2023-01-01 RX ADMIN — Medication 50 MILLILITER(S): at 00:15

## 2023-01-01 RX ADMIN — Medication 400 MILLIGRAM(S): at 12:08

## 2023-01-01 RX ADMIN — HEPARIN SODIUM 1500 UNIT(S)/HR: 5000 INJECTION INTRAVENOUS; SUBCUTANEOUS at 07:40

## 2023-01-01 RX ADMIN — CHLORHEXIDINE GLUCONATE 1 APPLICATION(S): 213 SOLUTION TOPICAL at 05:16

## 2023-01-01 RX ADMIN — Medication 50 MILLILITER(S): at 18:42

## 2023-01-01 RX ADMIN — CASPOFUNGIN ACETATE 260 MILLIGRAM(S): 7 INJECTION, POWDER, LYOPHILIZED, FOR SOLUTION INTRAVENOUS at 10:58

## 2023-01-01 RX ADMIN — FENTANYL CITRATE 50 MICROGRAM(S): 50 INJECTION INTRAVENOUS at 10:58

## 2023-01-01 RX ADMIN — MIDAZOLAM HYDROCHLORIDE 4 MILLIGRAM(S): 1 INJECTION, SOLUTION INTRAMUSCULAR; INTRAVENOUS at 11:53

## 2023-01-01 RX ADMIN — ALBUTEROL 2 PUFF(S): 90 AEROSOL, METERED ORAL at 21:47

## 2023-01-01 RX ADMIN — HEPARIN SODIUM 1700 UNIT(S)/HR: 5000 INJECTION INTRAVENOUS; SUBCUTANEOUS at 06:20

## 2023-01-01 RX ADMIN — CHLORHEXIDINE GLUCONATE 1 APPLICATION(S): 213 SOLUTION TOPICAL at 05:09

## 2023-01-01 RX ADMIN — Medication 19 MICROGRAM(S)/KG/MIN: at 09:48

## 2023-01-01 RX ADMIN — Medication 50 MILLIGRAM(S): at 21:12

## 2023-01-01 RX ADMIN — Medication 50 MILLIGRAM(S): at 03:26

## 2023-01-01 RX ADMIN — INSULIN HUMAN 10 UNIT(S): 100 INJECTION, SOLUTION SUBCUTANEOUS at 12:46

## 2023-01-01 RX ADMIN — MEROPENEM 100 MILLIGRAM(S): 1 INJECTION INTRAVENOUS at 21:36

## 2023-01-01 RX ADMIN — MUPIROCIN 1 APPLICATION(S): 20 OINTMENT TOPICAL at 17:44

## 2023-01-01 RX ADMIN — Medication 100 GRAM(S): at 10:26

## 2023-01-01 RX ADMIN — HEPARIN SODIUM 1600 UNIT(S)/HR: 5000 INJECTION INTRAVENOUS; SUBCUTANEOUS at 07:06

## 2023-01-01 RX ADMIN — HEPARIN SODIUM 1500 UNIT(S)/HR: 5000 INJECTION INTRAVENOUS; SUBCUTANEOUS at 21:21

## 2023-01-01 RX ADMIN — Medication 1 DROP(S): at 05:52

## 2023-01-01 RX ADMIN — Medication 1: at 06:37

## 2023-01-01 RX ADMIN — INSULIN HUMAN 10 UNIT(S): 100 INJECTION, SOLUTION SUBCUTANEOUS at 16:40

## 2023-01-01 RX ADMIN — HEPARIN SODIUM 1600 UNIT(S)/HR: 5000 INJECTION INTRAVENOUS; SUBCUTANEOUS at 08:19

## 2023-01-01 RX ADMIN — CHLORHEXIDINE GLUCONATE 1 APPLICATION(S): 213 SOLUTION TOPICAL at 06:35

## 2023-01-01 RX ADMIN — Medication 182 MICROGRAM(S)/KG/MIN: at 03:36

## 2023-01-01 RX ADMIN — Medication 1 DROP(S): at 22:58

## 2023-01-01 RX ADMIN — HEPARIN SODIUM 2000 UNIT(S)/HR: 5000 INJECTION INTRAVENOUS; SUBCUTANEOUS at 18:00

## 2023-01-01 RX ADMIN — Medication 50 MILLIGRAM(S): at 04:17

## 2023-01-01 RX ADMIN — VASOPRESSIN 6 UNIT(S)/MIN: 20 INJECTION INTRAVENOUS at 19:13

## 2023-01-01 RX ADMIN — ALBUTEROL 2 PUFF(S): 90 AEROSOL, METERED ORAL at 05:37

## 2023-01-01 RX ADMIN — Medication 100 MEQ/KG/HR: at 21:49

## 2023-01-01 RX ADMIN — HEPARIN SODIUM 1400 UNIT(S)/HR: 5000 INJECTION INTRAVENOUS; SUBCUTANEOUS at 19:04

## 2023-01-01 RX ADMIN — INSULIN HUMAN 5 UNIT(S): 100 INJECTION, SOLUTION SUBCUTANEOUS at 06:01

## 2023-01-01 RX ADMIN — Medication 1 DROP(S): at 11:38

## 2023-01-01 RX ADMIN — DESMOPRESSIN ACETATE 224 MICROGRAM(S): 0.1 TABLET ORAL at 12:30

## 2023-01-01 RX ADMIN — Medication 50 MILLIGRAM(S): at 18:22

## 2023-01-01 RX ADMIN — Medication 50 MILLIGRAM(S): at 04:28

## 2023-01-01 RX ADMIN — Medication 100 MILLIEQUIVALENT(S): at 17:36

## 2023-01-01 RX ADMIN — MEROPENEM 100 MILLIGRAM(S): 1 INJECTION INTRAVENOUS at 21:20

## 2023-01-01 RX ADMIN — Medication 2: at 17:34

## 2023-01-01 RX ADMIN — FENTANYL CITRATE 4.05 MICROGRAM(S)/KG/HR: 50 INJECTION INTRAVENOUS at 12:18

## 2023-01-01 RX ADMIN — VASOPRESSIN 6 UNIT(S)/MIN: 20 INJECTION INTRAVENOUS at 15:12

## 2023-01-01 RX ADMIN — MIDAZOLAM HYDROCHLORIDE 3.24 MG/KG/HR: 1 INJECTION, SOLUTION INTRAMUSCULAR; INTRAVENOUS at 18:47

## 2023-01-01 RX ADMIN — Medication 50 MILLILITER(S): at 23:30

## 2023-01-01 RX ADMIN — HEPARIN SODIUM 1400 UNIT(S)/HR: 5000 INJECTION INTRAVENOUS; SUBCUTANEOUS at 21:06

## 2023-01-01 RX ADMIN — Medication 100 MILLIEQUIVALENT(S): at 15:31

## 2023-01-01 RX ADMIN — HYDROMORPHONE HYDROCHLORIDE 0.5 MILLIGRAM(S): 2 INJECTION INTRAMUSCULAR; INTRAVENOUS; SUBCUTANEOUS at 11:21

## 2023-01-01 RX ADMIN — SODIUM CHLORIDE 1000 MILLILITER(S): 9 INJECTION INTRAMUSCULAR; INTRAVENOUS; SUBCUTANEOUS at 03:59

## 2023-01-01 RX ADMIN — CHLORHEXIDINE GLUCONATE 1 APPLICATION(S): 213 SOLUTION TOPICAL at 05:52

## 2023-01-01 RX ADMIN — DIATRIZOATE MEGLUMINE 30 MILLILITER(S): 180 INJECTION, SOLUTION INTRAVESICAL at 13:09

## 2023-01-01 RX ADMIN — INSULIN HUMAN 10 UNIT(S): 100 INJECTION, SOLUTION SUBCUTANEOUS at 06:09

## 2023-01-01 RX ADMIN — MEROPENEM 100 MILLIGRAM(S): 1 INJECTION INTRAVENOUS at 21:03

## 2023-01-01 RX ADMIN — FENTANYL CITRATE 4.05 MICROGRAM(S)/KG/HR: 50 INJECTION INTRAVENOUS at 18:47

## 2023-01-01 RX ADMIN — Medication 1 DROP(S): at 17:36

## 2023-01-01 RX ADMIN — Medication 50 MILLIGRAM(S): at 21:20

## 2023-01-01 RX ADMIN — HEPARIN SODIUM 1700 UNIT(S)/HR: 5000 INJECTION INTRAVENOUS; SUBCUTANEOUS at 01:02

## 2023-01-01 RX ADMIN — FENTANYL CITRATE 100 MICROGRAM(S): 50 INJECTION INTRAVENOUS at 14:31

## 2023-01-03 PROBLEM — N40.0 BPH WITHOUT OBSTRUCTION/LOWER URINARY TRACT SYMPTOMS: Status: ACTIVE | Noted: 2018-09-14

## 2023-01-25 PROBLEM — J45.909 ACTIVE ASTHMA: Status: ACTIVE | Noted: 2023-01-01

## 2023-01-25 PROBLEM — R97.20 ELEVATED PSA: Status: ACTIVE | Noted: 2020-08-05

## 2023-01-25 PROBLEM — I10 BENIGN ESSENTIAL HYPERTENSION: Status: ACTIVE | Noted: 2017-02-01

## 2023-01-25 PROBLEM — E11.9 CONTROLLED TYPE 2 DIABETES MELLITUS WITHOUT COMPLICATION, WITH LONG-TERM CURRENT USE OF INSULIN: Status: ACTIVE | Noted: 2022-05-16

## 2023-01-25 NOTE — ASSESSMENT
[FreeTextEntry1] : 77 Y OLD MALE WITH PMX OF HTN = STABLE \par DM = BETTER \par DYSLIPIDEMIA = STABLE \par BPH WITH ELEVATED PSA=  F/U \par RTO 3 M AFTER LABS

## 2023-01-25 NOTE — PHYSICAL EXAM
[No Acute Distress] : no acute distress [Normal] : soft, non-tender, non-distended, no masses palpated, no HSM and normal bowel sounds [No CVA Tenderness] : no CVA  tenderness [No Rash] : no rash [No Skin Lesions] : no skin lesions [Coordination Grossly Intact] : coordination grossly intact [No Focal Deficits] : no focal deficits [Alert and Oriented x3] : oriented to person, place, and time

## 2023-06-17 NOTE — H&P ADULT - ASSESSMENT
78 y/o male with PMHx of HTN , HLD, Asthma, no prior abdominal surgeries, complains of severe generalized abdominal pain and vomiting since about 8 PM.  Pain is described as sharp , persistent in the epigastric area with multiple > 4NBNB vomiting . Last BM was yesterday morning with persistent flatus. Mild leucocytosis, dehydrated , lactate is 2.1 . CT shows SBO     Admit to Dr Kuhn   NPO,IVF  NGT jennifer LCS (>800cc of gastric asp )   Serial Abd Exams   f/up labs inc lactate   DVT PPx

## 2023-06-17 NOTE — BRIEF OPERATIVE NOTE - OPERATION/FINDINGS
wound class 4  diagnostic laparoscopy converted to exploratory laparotomy due to gross spillage of enterotomy sites due to lysis of adhesions  side to side small bowel resection x2  ileum appeared dusky resulting in ileostomy

## 2023-06-17 NOTE — H&P ADULT - NSVTERISKASSESS_GEN_ALL_CORE FT
Patient is an 88-year-old  female with a past medical history significant for ovarian cancer who presents the hospital for a left hip fracture.  She underwent ORIF by Orthopedic surgery and did well postoperatively.  Patient was noted to be profoundly weak and was recommended for skilled nursing facility placement for further rehab.  Patient was discharged to skilled nursing facility without difficulty.   Surgical Assessment Completed on: 17-Jun-2023 06:22

## 2023-06-17 NOTE — H&P ADULT - HISTORY OF PRESENT ILLNESS
78 y/o male with PMHx of HTN , HLD, Asthma, no prior abdominal surgeries, complains of severe generalized abdominal pain and vomiting since about 8 PM.  Pain is described as sharp , persistent in the epigastric area with multiple > 4NBNB vomiting . Last BM was yesterday morning with persistent flatus . Denies fever or chills or dysuria. or any other complaints at this time.

## 2023-06-17 NOTE — BRIEF OPERATIVE NOTE - NSICDXBRIEFPROCEDURE_GEN_ALL_CORE_FT
PROCEDURES:  Exploratory laparotomy with small bowel resection 17-Jun-2023 17:56:49  Azalia Garcia  Lysis of intestinal adhesions 17-Jun-2023 17:57:35  Azalia Garcia  Ileostomy, Tran 17-Jun-2023 17:57:46  Azalia Garcia

## 2023-06-17 NOTE — CONSULT NOTE ADULT - ASSESSMENT
Assessment:   76 y/o male with PMHx of HTN , HLD, Asthma, no prior abdominal surgeries, complains of severe generalized abdominal pain and vomiting since about 8 PM.  Pain is described as sharp , persistent in the epigastric area with multiple > 4NBNB vomiting . Last BM was yesterday morning with persistent flatus. Mild leucocytosis, dehydrated , lactate is 2.1 . CT shows SBO    Plan:  Neuro:    Cardiovascular:    Pulmonary:     Infectious Diseases:    Gastrointestinal:    Renal:    Heme/onc:     Endo:     Skin/ catheter:     Prophylaxis:     Goals of Care:     Dispo:   Assessment:   76 y/o male with PMHx of HTN , HLD, Asthma, no prior abdominal surgeries, complains of severe generalized abdominal pain and vomiting since about 8 PM.  Pain is described as sharp , persistent in the epigastric area with multiple > 4NBNB vomiting . Last BM was yesterday morning with persistent flatus. Mild leucocytosis, dehydrated , lactate is 2.1 . CT shows SBO. Pt was taken to the OR for diagnostic laproscopy which was converted to exploratory lap with ileostomy due to spillage. ICU was consulted as pt was requiring pressor support during and post surgery.     #SBO  #AHRF  #Pankaj vs PANKAJ on CKD  #HTN  #HLD  #Asthma      Plan:  Neuro:  Currently intubated and Sedated.   Baseline AOx3    Cardiovascular:  #Shock - Hypovolemic vs Septic  Currently requiring pressor support with pheny and amrit  s/p 2.8L in the OR.   will continue with pressor support.   Will titrate down as tolerated    #HTN   Pt has history of HTN on amlodipine.  Will hold in the setting of shock.     Pulmonary:   #Acute Hypoxic Respiratory Failure  Pt was intubated for procedure.   Will continue to keep intubated and sedated.   Daily SBT as tolerated    Infectious Diseases:  #SBO  s/p exploratory laproscopy.   s/p ileostomy   will keep npo   NGT in place   will advance diet as per surgery   Will cover with cefepime and Flagyl secondary to spillage during surgery.       Gastrointestinal:  #SBO  s/p exploratory laproscopy.   s/p ileostomy   will keep npo   NGT in place   will advance diet as per surgery   Will cover with cefepime and Flagyl secondary to spillage during surgery.     Renal:  #PANKAJ VS PANKAJ on CKD  Pt w/ SCr 2.08 on admission  Baseline SCr - Unknown  Trended down to 1.67  F/U Urine Lytes, calculate FeNa  IVF for now, follow BMP daily      Heme/onc:  #No Acitve Issues     Endo:   #HLD  Pt has history of HLD  On Rosuvastatin 10 at home   Will continue as Atorvastatin 40 when given clearance by Surgery to advance diet.     Skin/ catheter: -     Prophylaxis:   DVT: SCD - Advance to chemical as per surgery.   GI: PPI     Goals of Care: Full Code    Dispo: Will continue to monitor in ICU.    Assessment:   78 y/o male with PMHx of HTN , HLD, Asthma, no prior abdominal surgeries, complains of severe generalized abdominal pain and vomiting since about 8 PM.  Pain is described as sharp , persistent in the epigastric area with multiple > 4NBNB vomiting . Last BM was yesterday morning with persistent flatus. Mild leucocytosis, dehydrated , lactate is 2.1 . CT shows SBO. Pt was taken to the OR for diagnostic laproscopy which was converted to exploratory lap with ileostomy due to spillage. ICU was consulted as pt was requiring pressor support during and post surgery.     #SBO  #AHRF  #Pankaj vs PANKAJ on CKD  #HTN  #HLD  #Asthma      Plan:  Neuro:  Currently intubated and Sedated.   Baseline AOx3    Cardiovascular:  #Shock - Hypovolemic vs Septic  Currently requiring pressor support with pheny and amrit  s/p 2.8L in the OR.   will continue with pressor support.   Will titrate down as tolerated    #HTN   Pt has history of HTN on amlodipine.  Will hold in the setting of shock.     Pulmonary:   #Acute Hypoxic Respiratory Failure  Pt was intubated for procedure.   Will continue to keep intubated and sedated.   Daily SBT as tolerated    Infectious Diseases:  #SBO  s/p exploratory laproscopy.   s/p ileostomy   will keep npo   NGT in place   will advance diet as per surgery   Will cover with cefepime and Flagyl secondary to spillage during surgery.       Gastrointestinal:  #SBO  s/p exploratory laproscopy.   s/p ileostomy   will keep npo   NGT in place   will advance diet as per surgery   Will cover with cefepime and Flagyl secondary to spillage during surgery.     Renal:  #PANKAJ VS PANKAJ on CKD  Pt w/ SCr 2.08 on admission  Baseline SCr - Unknown  Trended down to 1.67  F/U Urine Lytes, calculate FeNa  IVF for now, follow BMP daily      Heme/onc:  #No Acitve Issues     Endo:   #HLD  Pt has history of HLD  On Rosuvastatin 10 at home   Will continue as Atorvastatin 40 when given clearance by Surgery to advance diet.     Skin/ catheter: -   Fem central Line 6/17  Magui - 6/17  Caude langley - 6/17    Prophylaxis:   DVT: SCD - Advance to chemical as per surgery.   GI: PPI     Goals of Care: Full Code    Dispo: Will continue to monitor in ICU.

## 2023-06-17 NOTE — H&P ADULT - NSHPPHYSICALEXAM_GEN_ALL_CORE
Vital Signs Last 24 Hrs  T(C): 36.5 (17 Jun 2023 07:16), Max: 36.5 (17 Jun 2023 01:51)  T(F): 97.7 (17 Jun 2023 07:16), Max: 97.7 (17 Jun 2023 01:51)  HR: 73 (17 Jun 2023 07:16) (73 - 79)  BP: 130/73 (17 Jun 2023 07:16) (105/69 - 130/73)  BP(mean): --  RR: 17 (17 Jun 2023 07:16) (17 - 19)  SpO2: 96% (17 Jun 2023 07:16) (96% - 98%)    Parameters below as of 17 Jun 2023 07:16  Patient On (Oxygen Delivery Method): room air        General:  A&Ox3,Appears stated age, No acute distress,  Head: NC/AT  EENT: PERRLA. EOMI. Conjunctiva and sclera clear. Pharynx clear.  Neck: Supple. No JVD  Lungs: CTA B/l. Nonlabored Respirations  CV: +S1S2, RRR  Abdomen: Soft, moderately distended, +generalized non specific tenderness, no guarding, no rebound  Extremities: Warm and well perfused. 2+ peripheral pulses b/l. Calf soft, nontender b/l. No pedal edema.

## 2023-06-17 NOTE — CONSULT NOTE ADULT - CRITICAL CARE ATTENDING COMMENT
pt seen  case discussed with Surgeon, residents and nurses    #SBO  #AHRF  #Pankaj vs PANKAJ on CKD  #HTN  #HLD  #Asthma

## 2023-06-17 NOTE — H&P ADULT - NS ATTEND AMEND GEN_ALL_CORE FT
Pt with no prior intraabdominal surgeries (had a TURP) presenting w/ nausea, vomiting and abd pain  CT w/ SBO w/ transition point in RLQ, WBC of 17 and mild lactic acidosis of 2.1    NG tube placed w/ >800ccs initially obtained    - admit   - NPO, NG tube and IVFs  - given virgin abdomen w/ pain and WBC will plan for emergent exploration (diagnostic laparoscopy, possible ex lap, possible SBR, possible ostomy)    Adekemi MD Bam  Attending Physician

## 2023-06-17 NOTE — PROGRESS NOTE ADULT - ASSESSMENT
77M with virgin abdomen with SBO    npo, ngt, ivf  possible OR today for diagnostic laparoscopy possible exploratory laparotomy  discussed with Dr. Kuhn

## 2023-06-17 NOTE — PATIENT PROFILE ADULT - FALL HARM RISK - HARM RISK INTERVENTIONS
Assistance with ambulation/Assistance OOB with selected safe patient handling equipment/Communicate Risk of Fall with Harm to all staff/Monitor gait and stability/Reinforce activity limits and safety measures with patient and family/Review medications for side effects contributing to fall risk/Sit up slowly, dangle for a short time, stand at bedside before walking/Tailored Fall Risk Interventions/Toileting schedule using arm’s reach rule for commode and bathroom/Use of alarms - bed, chair and/or voice tab/Visual Cue: Yellow wristband and red socks/Bed in lowest position, wheels locked, appropriate side rails in place/Call bell, personal items and telephone in reach/Instruct patient to call for assistance before getting out of bed or chair/Non-slip footwear when patient is out of bed/[place] to call system/Physically safe environment - no spills, clutter or unnecessary equipment/Purposeful Proactive Rounding/Room/bathroom lighting operational, light cord in reach

## 2023-06-17 NOTE — ED PROVIDER NOTE - CLINICAL SUMMARY MEDICAL DECISION MAKING FREE TEXT BOX
77-year-old man, history of hypertension, high cholesterol, asthma, no prior abdominal surgeries, complains of severe generalized abdominal pain and vomiting since about 8 PM--CT A/P, labs, IVF, pain/nausea medication, reassess.

## 2023-06-17 NOTE — H&P ADULT - NSHPLABSRESULTS_GEN_ALL_CORE
15.3   13.56 )-----------( 257      ( 17 Jun 2023 06:30 )             46.8   06-17    x   |  x   |  x   ----------------------------<  x   x    |  x   |  1.69<H>    Ca    10.7<H>      17 Jun 2023 02:52  Phos  2.5     06-17  Mg     2.2     06-17    TPro  9.3<H>  /  Alb  4.2  /  TBili  0.6  /  DBili  x   /  AST  25  /  ALT  40  /  AlkPhos  88  06-17      < from: CT Abdomen and Pelvis w/ IV Cont (06.17.23 @ 04:55) >    FINDINGS:  LOWER CHEST: Bilateral reticular airspace opacities, evaluation of which   is limited by motion. Coronary artery calcification    LIVER: Low-attenuation lesion within the right hepatic lobe too small to   accurately characterize.  BILE DUCTS: Normal caliber.  GALLBLADDER: Cholelithiasis.  SPLEEN: Within normal limits.  PANCREAS: 1.6 cm cystic lesion within the pancreatic head. No pancreatic   ductal dilatation.  ADRENALS: Within normal limits.  KIDNEYS/URETERS: No hydronephrosis. Bilateral cysts. Low-attenuation   lesion within left kidney too small to accurately characterize.    BLADDER: Minimally distended, limiting evaluation.  REPRODUCTIVE ORGANS: Prostatomegaly, with suggestion of TURP defect    BOWEL: Small bowel obstruction with transition in the right lower   quadrant and the distal ileum (4:83). Stomach and visualized portions of   the distal esophagus are fluid distended Small bowel feces sign just   proximal to the level of the obstruction.Colonic diverticulosis without   evidence of acute diverticulitis.  PERITONEUM: No ascites. Nonspecific haziness of the central mesenteric   fat.  VESSELS: Atherosclerotic changes.  RETROPERITONEUM/LYMPH NODES: No lymphadenopathy.  ABDOMINAL WALL: Within normal limits.  BONES: Degenerative changes.    IMPRESSION:  Small bowel obstruction with transition in the right lower quadrant.    1.6 cm cystic lesion within the pancreatic head. Nonemergent MRI is   recommended for further characterization.    < end of copied text >

## 2023-06-17 NOTE — CONSULT NOTE ADULT - SUBJECTIVE AND OBJECTIVE BOX
Patient is a 77y old  Male who presents with a chief complaint of     HPI:  78 y/o male with PMHx of HTN , HLD, Asthma, no prior abdominal surgeries, complains of severe generalized abdominal pain and vomiting since about 8 PM.  Pain is described as sharp , persistent in the epigastric area with multiple > 4NBNB vomiting . Last BM was yesterday morning with persistent flatus . Denies fever or chills or dysuria. or any other complaints at this time.  (2023 06:53)      Allergies    Bactrim (Hives; Rash)    Intolerances        MEDICATIONS  (STANDING):  dextrose 5% + sodium chloride 0.45%. 1000 milliLiter(s) (100 mL/Hr) IV Continuous <Continuous>  enoxaparin Injectable 40 milliGRAM(s) SubCutaneous every 24 hours    MEDICATIONS  (PRN):  HYDROmorphone  Injectable 0.5 milliGRAM(s) IV Push every 4 hours PRN Moderate Pain (4 - 6)  ondansetron Injectable 4 milliGRAM(s) IV Push every 6 hours PRN Nausea      Daily Height in cm: 170 (2023 01:51)    Daily     Drug Dosing Weight  Height (cm): 170 (2023 01:51)  Weight (kg): 81 (2023 01:51)  BMI (kg/m2): 28 (2023 01:51)  BSA (m2): 1.93 (2023 01:51)    PAST MEDICAL & SURGICAL HISTORY:  Hypertension      History of asthma      History of high cholesterol      No significant past surgical history          FAMILY HISTORY:      SOCIAL HISTORY:    ADVANCE DIRECTIVES:    REVIEW OF SYSTEMS:    CONSTITUTIONAL: No fever, weight loss, or fatigue  EYES: No eye pain, visual disturbances, or discharge  ENMT:  No difficulty hearing, tinnitus, vertigo; No sinus or throat pain  NECK: No pain or stiffness  BREASTS: No pain, masses, or nipple discharge  RESPIRATORY: No cough, wheezing, chills or hemoptysis; No shortness of breath  CARDIOVASCULAR: No chest pain, palpitations, dizziness, or leg swelling  GASTROINTESTINAL: No abdominal or epigastric pain. No nausea, vomiting, or hematemesis; No diarrhea or constipation. No melena or hematochezia.  GENITOURINARY: No dysuria, frequency, hematuria, or incontinence  NEUROLOGICAL: No headaches, memory loss, loss of strength, numbness, or tremors  SKIN: No itching, burning, rashes, or lesions   LYMPH NODES: No enlarged glands  ENDOCRINE: No heat or cold intolerance; No hair loss  MUSCULOSKELETAL: No joint pain or swelling; No muscle, back, or extremity pain  PSYCHIATRIC: No depression, anxiety, mood swings, or difficulty sleeping  HEME/LYMPH: No easy bruising, or bleeding gums  ALLERGY AND IMMUNOLOGIC: No hives or eczema          ICU Vital Signs Last 24 Hrs  T(C): 36.7 (2023 14:20), Max: 36.7 (2023 14:20)  T(F): 98 (2023 14:20), Max: 98 (2023 14:20)  HR: 85 (2023 14:20) (73 - 85)  BP: 119/73 (2023 14:20) (105/69 - 138/68)  BP(mean): --  ABP: --  ABP(mean): --  RR: 18 (2023 14:20) (17 - 19)  SpO2: 73% (2023 14:20) (73% - 98%)    O2 Parameters below as of 2023 14:20  Patient On (Oxygen Delivery Method): room air                I&O's Detail    2023 07:01  -  2023 07:00  --------------------------------------------------------  IN:  Total IN: 0 mL    OUT:    Nasogastric/Oral tube (mL): 700 mL  Total OUT: 700 mL    Total NET: -700 mL          PHYSICAL EXAM:    GENERAL: NAD, well-groomed, well-developed  HEAD:  Atraumatic, Normocephalic  EYES: EOMI, PERRLA, conjunctiva and sclera clear  ENMT: No tonsillar erythema, exudates, or enlargement; Moist mucous membranes, Good dentition, No lesions  NECK: Supple, No JVD, Normal thyroid  NERVOUS SYSTEM:  Alert & Oriented X3, Good concentration; Motor Strength 5/5 B/L upper and lower extremities; DTRs 2+ intact and symmetric  CHEST/LUNG: Clear to percussion bilaterally; No rales, rhonchi, wheezing, or rubs  HEART: Regular rate and rhythm; No murmurs, rubs, or gallops  ABDOMEN: Soft, Nontender, Nondistended; Bowel sounds present  EXTREMITIES:  2+ Peripheral Pulses, No clubbing, cyanosis, or edema  LYMPH: No lymphadenopathy noted  SKIN: No rashes or lesions    LABS:  CBC Full  -  ( 2023 11:05 )  WBC Count : 12.78 K/uL  RBC Count : 5.57 M/uL  Hemoglobin : 16.3 g/dL  Hematocrit : 49.6 %  Platelet Count - Automated : 252 K/uL  Mean Cell Volume : 89.0 fl  Mean Cell Hemoglobin : 29.3 pg  Mean Cell Hemoglobin Concentration : 32.9 gm/dL  Auto Neutrophil # : x  Auto Lymphocyte # : x  Auto Monocyte # : x  Auto Eosinophil # : x  Auto Basophil # : x  Auto Neutrophil % : x  Auto Lymphocyte % : x  Auto Monocyte % : x  Auto Eosinophil % : x  Auto Basophil % : x    -    136  |  104  |  43<H>  ----------------------------<  226<H>  5.1   |  25  |  1.67<H>    Ca    9.4      2023 11:05  Phos  2.5     06-17  Mg     2.2     06-17    TPro  9.3<H>  /  Alb  4.2  /  TBili  0.6  /  DBili  x   /  AST  25  /  ALT  40  /  AlkPhos  88  06-17    CAPILLARY BLOOD GLUCOSE        PT/INR - ( 2023 06:30 )   PT: 14.1 sec;   INR: 1.18 ratio         PTT - ( 2023 06:30 )  PTT:30.1 sec  Urinalysis Basic - ( 2023 04:52 )    Color: Yellow / Appearance: Clear / S.020 / pH: x  Gluc: x / Ketone: Negative  / Bili: Negative / Urobili: Negative   Blood: x / Protein: 15 mg/dL / Nitrite: Negative   Leuk Esterase: Negative / RBC: 2-5 /HPF / WBC 0-2 /HPF   Sq Epi: x / Non Sq Epi: x / Bacteria: Few /HPF              EKG:    ECHO, US:    RADIOLOGY:   < from: CT Abdomen and Pelvis w/ IV Cont (23 @ 04:55) >  ACC: [id number] EXAM:  CT ABDOMEN AND PELVIS IC   ORDERED BY: TWYLA HERNANDEZ DATE:  2023          INTERPRETATION:  CLINICAL INFORMATION: Diffuse abdominal pain and   tenderness. Vomiting. Rule out obstruction.    COMPARISON: None.    CONTRAST/COMPLICATIONS:  IV Contrast: Omnipaque 350  90 cc administered   10 cc discarded  Oral Contrast: NONE  Complications: None reported at time of study completion    PROCEDURE:  CT of the Abdomen and Pelvis was performed.  Sagittal and coronal reformats were performed.    FINDINGS:  LOWER CHEST: Bilateral reticular airspace opacities, evaluation of which   is limited by motion. Coronary artery calcification    LIVER: Low-attenuation lesion within the right hepatic lobe too small to   accurately characterize.  BILE DUCTS: Normal caliber.  GALLBLADDER: Cholelithiasis.  SPLEEN: Within normal limits.  PANCREAS: 1.6 cm cystic lesion within the pancreatic head. No pancreatic   ductal dilatation.  ADRENALS: Within normal limits.  KIDNEYS/URETERS: No hydronephrosis. Bilateral cysts. Low-attenuation   lesion within left kidney too small to accurately characterize.    BLADDER: Minimally distended, limiting evaluation.  REPRODUCTIVE ORGANS: Prostatomegaly, with suggestion of TURP defect    BOWEL: Small bowel obstruction with transition in the right lower   quadrant and the distal ileum (4:83). Stomach and visualized portions of   the distal esophagus are fluid distended Small bowel feces sign just   proximal to the level of the obstruction.Colonic diverticulosis without   evidence of acute diverticulitis.  PERITONEUM: No ascites. Nonspecific haziness of the central mesenteric   fat.  VESSELS: Atherosclerotic changes.  RETROPERITONEUM/LYMPH NODES: No lymphadenopathy.  ABDOMINAL WALL: Within normal limits.  BONES: Degenerative changes.    IMPRESSION:  Small bowel obstruction with transition in the right lower quadrant.    1.6 cm cystic lesion within the pancreatic head. Nonemergent MRI is   recommended for further characterization.    --- End of Report ---            MICHEAL GIRON MD; Attending Radiologist  This document has been electronically signed. 2023  5:32AM    < end of copied text >      CRITICAL CARE TIME SPENT:   Patient is a 77y old  Male who presents with a chief complaint of     HPI:  78 y/o male with PMHx of HTN , HLD, Asthma, no prior abdominal surgeries, complains of severe generalized abdominal pain and vomiting since about 8 PM.  Pain is described as sharp , persistent in the epigastric area with multiple > 4NBNB vomiting . Last BM was yesterday morning with persistent flatus . Denies fever or chills or dysuria. or any other complaints at this time.  (2023 06:53)    Intermitent History: Pt was taken to the OR for diagnostic laproscopy which was converted to exploratory lap with ileostomy due to spillage. ICU was consulted as pt was requiring pressor support during and post surgery.       Allergies    Bactrim (Hives; Rash)    Intolerances        MEDICATIONS  (STANDING):  dextrose 5% + sodium chloride 0.45%. 1000 milliLiter(s) (100 mL/Hr) IV Continuous <Continuous>  enoxaparin Injectable 40 milliGRAM(s) SubCutaneous every 24 hours    MEDICATIONS  (PRN):  HYDROmorphone  Injectable 0.5 milliGRAM(s) IV Push every 4 hours PRN Moderate Pain (4 - 6)  ondansetron Injectable 4 milliGRAM(s) IV Push every 6 hours PRN Nausea      Daily Height in cm: 170 (2023 01:51)    Daily     Drug Dosing Weight  Height (cm): 170 (2023 01:51)  Weight (kg): 81 (2023 01:51)  BMI (kg/m2): 28 (2023 01:51)  BSA (m2): 1.93 (2023 01:51)    PAST MEDICAL & SURGICAL HISTORY:  Hypertension      History of asthma      History of high cholesterol      No significant past surgical history          FAMILY HISTORY:      SOCIAL HISTORY:    ADVANCE DIRECTIVES:    REVIEW OF SYSTEMS: Intubated and Sedated.           ICU Vital Signs Last 24 Hrs  T(C): 36.7 (2023 14:20), Max: 36.7 (2023 14:20)  T(F): 98 (2023 14:20), Max: 98 (2023 14:20)  HR: 85 (2023 14:20) (73 - 85)  BP: 119/73 (2023 14:20) (105/69 - 138/68)  BP(mean): --  ABP: --  ABP(mean): --  RR: 18 (2023 14:20) (17 - 19)  SpO2: 73% (2023 14:20) (73% - 98%)    O2 Parameters below as of 2023 14:20  Patient On (Oxygen Delivery Method): room air                I&O's Detail    2023 07:01  -  2023 07:00  --------------------------------------------------------  IN:  Total IN: 0 mL    OUT:    Nasogastric/Oral tube (mL): 700 mL  Total OUT: 700 mL    Total NET: -700 mL          PHYSICAL EXAM:    GENERAL: Intubated and sedated.   HEAD:  Atraumatic, Normocephalic  EYES: EOMI, PERRLA, conjunctiva and sclera clear  ENMT: No tonsillar erythema, exudates, or enlargement; Moist mucous membranes, Good dentition, No lesions  NECK: Supple, No JVD, Normal thyroid  NERVOUS SYSTEM:  Intubated and Sedated.   CHEST/LUNG: Clear to percussion bilaterally; No rales, rhonchi, wheezing, or rubs  HEART: Regular rate and rhythm; No murmurs, rubs, or gallops  ABDOMEN: Surgical Scar with ileostomy   EXTREMITIES:  2+ Peripheral Pulses, No clubbing, cyanosis, or edema  LYMPH: No lymphadenopathy noted  SKIN: No rashes or lesions    LABS:  CBC Full  -  ( 2023 11:05 )  WBC Count : 12.78 K/uL  RBC Count : 5.57 M/uL  Hemoglobin : 16.3 g/dL  Hematocrit : 49.6 %  Platelet Count - Automated : 252 K/uL  Mean Cell Volume : 89.0 fl  Mean Cell Hemoglobin : 29.3 pg  Mean Cell Hemoglobin Concentration : 32.9 gm/dL  Auto Neutrophil # : x  Auto Lymphocyte # : x  Auto Monocyte # : x  Auto Eosinophil # : x  Auto Basophil # : x  Auto Neutrophil % : x  Auto Lymphocyte % : x  Auto Monocyte % : x  Auto Eosinophil % : x  Auto Basophil % : x    06-17    136  |  104  |  43<H>  ----------------------------<  226<H>  5.1   |  25  |  1.67<H>    Ca    9.4      2023 11:05  Phos  2.5       Mg     2.2         TPro  9.3<H>  /  Alb  4.2  /  TBili  0.6  /  DBili  x   /  AST  25  /  ALT  40  /  AlkPhos  88      CAPILLARY BLOOD GLUCOSE        PT/INR - ( 2023 06:30 )   PT: 14.1 sec;   INR: 1.18 ratio         PTT - ( 2023 06:30 )  PTT:30.1 sec  Urinalysis Basic - ( 2023 04:52 )    Color: Yellow / Appearance: Clear / S.020 / pH: x  Gluc: x / Ketone: Negative  / Bili: Negative / Urobili: Negative   Blood: x / Protein: 15 mg/dL / Nitrite: Negative   Leuk Esterase: Negative / RBC: 2-5 /HPF / WBC 0-2 /HPF   Sq Epi: x / Non Sq Epi: x / Bacteria: Few /HPF              EKG:    ECHO, US:    RADIOLOGY:   < from: CT Abdomen and Pelvis w/ IV Cont (23 @ 04:55) >  ACC: [id number] EXAM:  CT ABDOMEN AND PELVIS IC   ORDERED BY: TWYLA CRUZ     PROCEDURE DATE:  2023          INTERPRETATION:  CLINICAL INFORMATION: Diffuse abdominal pain and   tenderness. Vomiting. Rule out obstruction.    COMPARISON: None.    CONTRAST/COMPLICATIONS:  IV Contrast: Omnipaque 350  90 cc administered   10 cc discarded  Oral Contrast: NONE  Complications: None reported at time of study completion    PROCEDURE:  CT of the Abdomen and Pelvis was performed.  Sagittal and coronal reformats were performed.    FINDINGS:  LOWER CHEST: Bilateral reticular airspace opacities, evaluation of which   is limited by motion. Coronary artery calcification    LIVER: Low-attenuation lesion within the right hepatic lobe too small to   accurately characterize.  BILE DUCTS: Normal caliber.  GALLBLADDER: Cholelithiasis.  SPLEEN: Within normal limits.  PANCREAS: 1.6 cm cystic lesion within the pancreatic head. No pancreatic   ductal dilatation.  ADRENALS: Within normal limits.  KIDNEYS/URETERS: No hydronephrosis. Bilateral cysts. Low-attenuation   lesion within left kidney too small to accurately characterize.    BLADDER: Minimally distended, limiting evaluation.  REPRODUCTIVE ORGANS: Prostatomegaly, with suggestion of TURP defect    BOWEL: Small bowel obstruction with transition in the right lower   quadrant and the distal ileum (4:83). Stomach and visualized portions of   the distal esophagus are fluid distended Small bowel feces sign just   proximal to the level of the obstruction.Colonic diverticulosis without   evidence of acute diverticulitis.  PERITONEUM: No ascites. Nonspecific haziness of the central mesenteric   fat.  VESSELS: Atherosclerotic changes.  RETROPERITONEUM/LYMPH NODES: No lymphadenopathy.  ABDOMINAL WALL: Within normal limits.  BONES: Degenerative changes.    IMPRESSION:  Small bowel obstruction with transition in the right lower quadrant.    1.6 cm cystic lesion within the pancreatic head. Nonemergent MRI is   recommended for further characterization.    --- End of Report ---            MICHEAL GIRON MD; Attending Radiologist  This document has been electronically signed. 2023  5:32AM    < end of copied text >      CRITICAL CARE TIME SPENT:

## 2023-06-17 NOTE — ED PROVIDER NOTE - PROGRESS NOTE DETAILS
D/w gen surg PA, Jr, for surgery consult regarding SBO on CT.  Pt now much more comfortable and no further vomiting after morphine and Zofran. DENG Norris placed NGT and says there was 800 mL initial output.  He says to admit to Dr. Kuhn.

## 2023-06-17 NOTE — CHART NOTE - NSCHARTNOTEFT_GEN_A_CORE
Presented to ICU from OR on low dose Levophed and Phenylephrine. During initial assessment he developed worsening hypotension. No emergent findings on POCUS. Right femoral central line placed and started on Levophed and Vasopressin. Given IVF and started on empiric antibiotics. Surgery attending Dr. Kuhn at bedside. Concern for septic shock, in critical condition.

## 2023-06-17 NOTE — PROGRESS NOTE ADULT - SUBJECTIVE AND OBJECTIVE BOX
POST OP CHECK  Pt resting comfortably. Intubated and sedated.    MEDICATIONS  (STANDING):  cefepime   IVPB 1000 milliGRAM(s) IV Intermittent every 12 hours  chlorhexidine 0.12% Liquid 15 milliLiter(s) Oral Mucosa every 12 hours  chlorhexidine 2% Cloths 1 Application(s) Topical <User Schedule>  enoxaparin Injectable 40 milliGRAM(s) SubCutaneous every 24 hours  metroNIDAZOLE  IVPB 500 milliGRAM(s) IV Intermittent every 8 hours  norepinephrine Infusion 1.5 MICROgram(s)/kG/Min (114 mL/Hr) IV Continuous <Continuous>  propofol Infusion 30.041 MICROgram(s)/kG/Min (14.6 mL/Hr) IV Continuous <Continuous>  sodium chloride 0.9%. 1000 milliLiter(s) (100 mL/Hr) IV Continuous <Continuous>  vasopressin Infusion 0.04 Unit(s)/Min (6 mL/Hr) IV Continuous <Continuous>    MEDICATIONS  (PRN):  HYDROmorphone  Injectable 0.5 milliGRAM(s) IV Push every 4 hours PRN Moderate Pain (4 - 6)  ondansetron Injectable 4 milliGRAM(s) IV Push every 6 hours PRN Nausea  sodium chloride 0.9% lock flush 10 milliLiter(s) IV Push every 1 hour PRN Pre/post blood products, medications, blood draw, and to maintain line patency      Vital Signs Last 24 Hrs  T(C): 36 (17 Jun 2023 20:00), Max: 36.7 (17 Jun 2023 14:20)  T(F): 96.8 (17 Jun 2023 20:00), Max: 98 (17 Jun 2023 14:20)  HR: 75 (17 Jun 2023 23:00) (73 - 149)  BP: 119/73 (17 Jun 2023 14:20) (105/69 - 138/68)  BP(mean): --  RR: 20 (17 Jun 2023 23:00) (17 - 31)  SpO2: 99% (17 Jun 2023 23:00) (73% - 100%)    Parameters below as of 17 Jun 2023 14:20  Patient On (Oxygen Delivery Method): room air        Physical:  General: Sedated. NAD.  Resp: Intubated, on vent.  Head/Neck: NGT in place. Draining 250cc bilious output.   Abdomen: Distended. Unable to assess tenderness. Ileostomy pink. No air or stool noted in bag. Midline dressing clean, dry, intact. No active drainage/bleeding noted.   : Naik in place.     I&O's Detail    16 Jun 2023 07:01  -  17 Jun 2023 07:00  --------------------------------------------------------  IN:  Total IN: 0 mL    OUT:    Nasogastric/Oral tube (mL): 700 mL  Total OUT: 700 mL    Total NET: -700 mL      17 Jun 2023 07:01  -  17 Jun 2023 23:23  --------------------------------------------------------  IN:    dextrose 5% + sodium chloride 0.45%: 100 mL    IV PiggyBack: 100 mL    IV PiggyBack: 100 mL    IV PiggyBack: 50 mL    Norepinephrine: 65.5 mL    Propofol: 12.2 mL    Vasopressin: 6 mL  Total IN: 433.7 mL    OUT:  Total OUT: 0 mL    Total NET: 433.7 mL          LABS:                        16.0   2.49  )-----------( 244      ( 17 Jun 2023 18:14 )             48.4             06-17    139  |  112<H>  |  45<H>  ----------------------------<  153<H>  4.9   |  16<L>  |  2.54<H>    Ca    9.6      17 Jun 2023 18:14  Phos  4.1     06-17  Mg     1.7     06-17    TPro  6.2  /  Alb  2.7<L>  /  TBili  1.1  /  DBili  x   /  AST  23  /  ALT  24  /  AlkPhos  71  06-17      77y.o. Male

## 2023-06-17 NOTE — ED ADULT NURSE REASSESSMENT NOTE - NS ED NURSE REASSESS COMMENT FT1
Patient received from night RN A&OX3 patient c/o stomach discomfort 6/10. Patient denies chest pain. No SOB noted. Patient is on NG tube inserted by PA 700ml output. Patient sitting up in bed. Grandson at bedside.

## 2023-06-17 NOTE — PROGRESS NOTE ADULT - SUBJECTIVE AND OBJECTIVE BOX
S: Pt seen at bedside, still c/o right sided abdominal pain. NGT flushed and connected to low wall suction, functioning well.     O:  Vital Signs Last 24 Hrs  T(C): 36.3 (17 Jun 2023 09:16), Max: 36.5 (17 Jun 2023 01:51)  T(F): 97.3 (17 Jun 2023 09:16), Max: 97.7 (17 Jun 2023 01:51)  HR: 74 (17 Jun 2023 09:16) (73 - 79)  BP: 138/68 (17 Jun 2023 09:16) (105/69 - 138/68)  BP(mean): --  RR: 18 (17 Jun 2023 09:16) (17 - 19)  SpO2: 95% (17 Jun 2023 09:16) (95% - 98%)    Parameters below as of 17 Jun 2023 09:16  Patient On (Oxygen Delivery Method): room air    Exam:   General: awake, alert,   Resp: nonlabored  HEENT: NGT with feculent appearing output  Abdomen: distended, tenderness to palpation in right upper quadrant and epigastrium  Extremities: warm and well perfused

## 2023-06-17 NOTE — ED ADULT NURSE NOTE - NSFALLUNIVINTERV_ED_ALL_ED
Bed/Stretcher in lowest position, wheels locked, appropriate side rails in place/Call bell, personal items and telephone in reach/Instruct patient to call for assistance before getting out of bed/chair/stretcher/Non-slip footwear applied when patient is off stretcher/[place] to call system/Physically safe environment - no spills, clutter or unnecessary equipment/Purposeful proactive rounding/Room/bathroom lighting operational, light cord in reach

## 2023-06-17 NOTE — PROGRESS NOTE ADULT - ASSESSMENT
78 y/o male s/p dx lap converted to ex lap LOU, SBRx2 with ileostomy creation, POD#0  prognosis: guarded, on pressorsx2    Plan   - keep NPO + IVF  - continue NGT to suction, monitor output   - continue IV antibiotics   - monitor ileostomy output  - daily dressing changes prn   - pressor support as per ICU team   - remainder of care as per primary team

## 2023-06-17 NOTE — ED PROVIDER NOTE - OBJECTIVE STATEMENT
77-year-old man, history of hypertension, high cholesterol, asthma, no prior abdominal surgeries, complains of severe generalized abdominal pain and vomiting since about 8 PM.  He did have a normal bowel movement in the morning.  He denies any fever or chills or dysuria.

## 2023-06-18 NOTE — CONSULT NOTE ADULT - ASSESSMENT
Septic Shock  Peritonitis  Leukocytosis - normalized  Resp failure      Plan - Cont Meropenem 500mgs iv q12hrs for now , will adjust dose per cr cl  Cont Caspofungin 50mgs iv q24hrs  Cont pressor support  await culture results.

## 2023-06-18 NOTE — PROGRESS NOTE ADULT - SUBJECTIVE AND OBJECTIVE BOX
INTERVAL HPI/OVERNIGHT EVENTS: no acute overnight events. Patient seen and examined at bedside, in NAD, sedated and intubated.     PRESSORS: [x ] YES [ ] NO  WHICH: Vaso/Levo    Antimicrobial:  cefepime   IVPB 1000 milliGRAM(s) IV Intermittent every 12 hours  metroNIDAZOLE  IVPB 500 milliGRAM(s) IV Intermittent every 8 hours    Cardiovascular:  norepinephrine Infusion 1.5 MICROgram(s)/kG/Min IV Continuous <Continuous>    Pulmonary:    Hematalogic:    Other:  chlorhexidine 0.12% Liquid 15 milliLiter(s) Oral Mucosa every 12 hours  chlorhexidine 2% Cloths 1 Application(s) Topical <User Schedule>  HYDROmorphone  Injectable 0.5 milliGRAM(s) IV Push every 4 hours PRN  ondansetron Injectable 4 milliGRAM(s) IV Push every 6 hours PRN  propofol Infusion 30.041 MICROgram(s)/kG/Min IV Continuous <Continuous>  sodium chloride 0.9% lock flush 10 milliLiter(s) IV Push every 1 hour PRN  sodium chloride 0.9%. 1000 milliLiter(s) IV Continuous <Continuous>  vasopressin Infusion 0.04 Unit(s)/Min IV Continuous <Continuous>    cefepime   IVPB 1000 milliGRAM(s) IV Intermittent every 12 hours  chlorhexidine 0.12% Liquid 15 milliLiter(s) Oral Mucosa every 12 hours  chlorhexidine 2% Cloths 1 Application(s) Topical <User Schedule>  HYDROmorphone  Injectable 0.5 milliGRAM(s) IV Push every 4 hours PRN  metroNIDAZOLE  IVPB 500 milliGRAM(s) IV Intermittent every 8 hours  norepinephrine Infusion 1.5 MICROgram(s)/kG/Min IV Continuous <Continuous>  ondansetron Injectable 4 milliGRAM(s) IV Push every 6 hours PRN  propofol Infusion 30.041 MICROgram(s)/kG/Min IV Continuous <Continuous>  sodium chloride 0.9% lock flush 10 milliLiter(s) IV Push every 1 hour PRN  sodium chloride 0.9%. 1000 milliLiter(s) IV Continuous <Continuous>  vasopressin Infusion 0.04 Unit(s)/Min IV Continuous <Continuous>    Drug Dosing Weight  Height (cm): 170 (2023 01:51)  Weight (kg): 81 (2023 19:13)  BMI (kg/m2): 28 (2023 19:13)  BSA (m2): 1.93 (2023 19:13)    CENTRAL LINE: [x ] YES [ ] NO  LOCATION:   DATE INSERTED:  REMOVE: [ ] YES [ ] NO  EXPLAIN:    MCWILLIAMS: [x ] YES [ ] NO    DATE INSERTED:  REMOVE:  [ ] YES [ ] NO  EXPLAIN:    A-LINE:  [x ] YES [ ] NO  LOCATION:   DATE INSERTED:  REMOVE:  [ ] YES [ ] NO  EXPLAIN:    PMH -reviewed admission note, no change since admission  PAST MEDICAL & SURGICAL HISTORY:  Hypertension      History of asthma      History of high cholesterol      No significant past surgical history          ICU Vital Signs Last 24 Hrs  T(C): 35.9 (2023 00:00), Max: 36.7 (2023 14:20)  T(F): 96.7 (2023 00:00), Max: 98 (2023 14:20)  HR: 83 (2023 02:58) (70 - 149)  BP: 119/73 (2023 14:20) (119/73 - 138/68)  BP(mean): --  ABP: 103/61 (2023 02:58) (73/60 - 163/73)  ABP(mean): 74 (2023 02:58) (53 - 94)  RR: 24 (2023 02:58) (17 - 31)  SpO2: 98% (2023 02:58) (73% - 100%)    O2 Parameters below as of 2023 19:30  Patient On (Oxygen Delivery Method): ventilator    O2 Concentration (%): 40        ABG - ( 2023 02:35 )  pH, Arterial: 7.33  pH, Blood: x     /  pCO2: 34    /  pO2: 90    / HCO3: 18    / Base Excess: -7.1  /  SaO2: 98                    06-16 @ 07:01  -  06-17 @ 07:00  --------------------------------------------------------  IN: 0 mL / OUT: 700 mL / NET: -700 mL        Mode: AC/ CMV (Assist Control/ Continuous Mandatory Ventilation)  RR (machine): 16  TV (machine): 450  FiO2: 40  PEEP: 5  ITime: 0.9  MAP: 9  PIP: 28      PHYSICAL EXAM:    GENERAL: Intubated and sedated.   HEAD:  Atraumatic, Normocephalic  EYES: EOMI, PERRLA, conjunctiva and sclera clear  ENMT: No tonsillar erythema, exudates, or enlargement; Moist mucous membranes, Good dentition, No lesions  NECK: Supple, No JVD, Normal thyroid  NERVOUS SYSTEM:  Intubated and Sedated.   CHEST/LUNG: Clear to percussion bilaterally; No rales, rhonchi, wheezing, or rubs  HEART: Regular rate and rhythm; No murmurs, rubs, or gallops  ABDOMEN: Surgical Scar with ileostomy   EXTREMITIES:  2+ Peripheral Pulses, No clubbing, cyanosis, or edema  LYMPH: No lymphadenopathy noted  SKIN: No rashes or lesions    LABS:  CBC Full  -  ( 2023 18:14 )  WBC Count : 2.49 K/uL  RBC Count : 5.52 M/uL  Hemoglobin : 16.0 g/dL  Hematocrit : 48.4 %  Platelet Count - Automated : 244 K/uL  Mean Cell Volume : 87.7 fl  Mean Cell Hemoglobin : 29.0 pg  Mean Cell Hemoglobin Concentration : 33.1 gm/dL  Auto Neutrophil # : x  Auto Lymphocyte # : x  Auto Monocyte # : x  Auto Eosinophil # : x  Auto Basophil # : x  Auto Neutrophil % : x  Auto Lymphocyte % : x  Auto Monocyte % : x  Auto Eosinophil % : x  Auto Basophil % : x    06-17    142  |  110<H>  |  48<H>  ----------------------------<  201<H>  4.1   |  19<L>  |  2.60<H>    Ca    9.2      2023 23:25  Phos  4.1     06-  Mg     1.7     -17    TPro  6.2  /  Alb  2.7<L>  /  TBili  1.1  /  DBili  x   /  AST  23  /  ALT  24  /  AlkPhos  71  06-17    PT/INR - ( 2023 18:14 )   PT: 14.1 sec;   INR: 1.18 ratio         PTT - ( 2023 18:14 )  PTT:28.5 sec  Urinalysis Basic - ( 2023 04:52 )    Color: Yellow / Appearance: Clear / S.020 / pH: x  Gluc: x / Ketone: Negative  / Bili: Negative / Urobili: Negative   Blood: x / Protein: 15 mg/dL / Nitrite: Negative   Leuk Esterase: Negative / RBC: 2-5 /HPF / WBC 0-2 /HPF   Sq Epi: x / Non Sq Epi: x / Bacteria: Few /HPF          RADIOLOGY & ADDITIONAL STUDIES REVIEWED:  ***    [ ]GOALS OF CARE DISCUSSION WITH PATIENT/FAMILY/PROXY:    CRITICAL CARE TIME SPENT: 35 minutes INTERVAL HPI/OVERNIGHT EVENTS:   no acute overnight events. Patient seen and examined at bedside, in NAD, sedated and intubated.     PRESSORS: [x ] YES [ ] NO  WHICH: Vaso/Levo    Antimicrobial:  Meropenem   metroNIDAZOLE  IVPB 500 milliGRAM(s) IV Intermittent every 8 hours  Caspofungin     Cardiovascular:  norepinephrine Infusion 1.5 MICROgram(s)/kG/Min IV Continuous <Continuous>  Vasopressin     Other:  chlorhexidine 0.12% Liquid 15 milliLiter(s) Oral Mucosa every 12 hours  chlorhexidine 2% Cloths 1 Application(s) Topical <User Schedule>  HYDROmorphone  Injectable 0.5 milliGRAM(s) IV Push every 4 hours PRN  ondansetron Injectable 4 milliGRAM(s) IV Push every 6 hours PRN  propofol Infusion 30.041 MICROgram(s)/kG/Min IV Continuous <Continuous>  sodium chloride 0.9% lock flush 10 milliLiter(s) IV Push every 1 hour PRN  sodium chloride 0.9%. 1000 milliLiter(s) IV Continuous <Continuous>  vasopressin Infusion 0.04 Unit(s)/Min IV Continuous <Continuous>    cefepime   IVPB 1000 milliGRAM(s) IV Intermittent every 12 hours  chlorhexidine 0.12% Liquid 15 milliLiter(s) Oral Mucosa every 12 hours  chlorhexidine 2% Cloths 1 Application(s) Topical <User Schedule>  HYDROmorphone  Injectable 0.5 milliGRAM(s) IV Push every 4 hours PRN  metroNIDAZOLE  IVPB 500 milliGRAM(s) IV Intermittent every 8 hours  norepinephrine Infusion 1.5 MICROgram(s)/kG/Min IV Continuous <Continuous>  ondansetron Injectable 4 milliGRAM(s) IV Push every 6 hours PRN  propofol Infusion 30.041 MICROgram(s)/kG/Min IV Continuous <Continuous>  sodium chloride 0.9% lock flush 10 milliLiter(s) IV Push every 1 hour PRN  sodium chloride 0.9%. 1000 milliLiter(s) IV Continuous <Continuous>  vasopressin Infusion 0.04 Unit(s)/Min IV Continuous <Continuous>    Drug Dosing Weight  Height (cm): 170 (2023 01:51)  Weight (kg): 81 (2023 19:13)  BMI (kg/m2): 28 (2023 19:13)  BSA (m2): 1.93 (2023 19:13)    CENTRAL LINE: [x ] YES [ ] NO  LOCATION:   DATE INSERTED:  REMOVE: [ ] YES [ ] NO  EXPLAIN:    MCWILLIAMS: [x ] YES [ ] NO    DATE INSERTED:  REMOVE:  [ ] YES [ ] NO  EXPLAIN:    A-LINE:  [x ] YES [ ] NO  LOCATION:   DATE INSERTED:  REMOVE:  [ ] YES [ ] NO  EXPLAIN:    PMH -reviewed admission note, no change since admission  PAST MEDICAL & SURGICAL HISTORY:  Hypertension      History of asthma      History of high cholesterol      No significant past surgical history          ICU Vital Signs Last 24 Hrs  T(C): 35.9 (2023 00:00), Max: 36.7 (2023 14:20)  T(F): 96.7 (2023 00:00), Max: 98 (2023 14:20)  HR: 83 (2023 02:58) (70 - 149)  BP: 119/73 (2023 14:20) (119/73 - 138/68)  BP(mean): --  ABP: 103/61 (2023 02:58) (73/60 - 163/73)  ABP(mean): 74 (2023 02:58) (53 - 94)  RR: 24 (2023 02:58) (17 - 31)  SpO2: 98% (2023 02:58) (73% - 100%)    O2 Parameters below as of 2023 19:30  Patient On (Oxygen Delivery Method): ventilator    O2 Concentration (%): 40        ABG - ( 2023 02:35 )  pH, Arterial: 7.33  pH, Blood: x     /  pCO2: 34    /  pO2: 90    / HCO3: 18    / Base Excess: -7.1  /  SaO2: 98                    06-16 @ 07:01  -  06-17 @ 07:00  --------------------------------------------------------  IN: 0 mL / OUT: 700 mL / NET: -700 mL        Mode: AC/ CMV (Assist Control/ Continuous Mandatory Ventilation)  RR (machine): 16  TV (machine): 450  FiO2: 40  PEEP: 5  ITime: 0.9  MAP: 9  PIP: 28      PHYSICAL EXAM:    GENERAL: Intubated and sedated.   HEAD:  Atraumatic, Normocephalic  EYES: EOMI, PERRLA, conjunctiva and sclera clear  ENMT: No tonsillar erythema, exudates, or enlargement; Moist mucous membranes, Good dentition, No lesions  NECK: Supple, No JVD, Normal thyroid  NERVOUS SYSTEM:  Intubated and Sedated.   CHEST/LUNG: Clear to percussion bilaterally; No rales, rhonchi, wheezing, or rubs  HEART: Regular rate and rhythm; No murmurs, rubs, or gallops  ABDOMEN: Surgical Scar with ileostomy   EXTREMITIES:  2+ Peripheral Pulses, No clubbing, cyanosis, or edema  LYMPH: No lymphadenopathy noted  SKIN: No rashes or lesions    LABS:  CBC Full  -  ( 2023 18:14 )  WBC Count : 2.49 K/uL  RBC Count : 5.52 M/uL  Hemoglobin : 16.0 g/dL  Hematocrit : 48.4 %  Platelet Count - Automated : 244 K/uL  Mean Cell Volume : 87.7 fl  Mean Cell Hemoglobin : 29.0 pg  Mean Cell Hemoglobin Concentration : 33.1 gm/dL  Auto Neutrophil # : x  Auto Lymphocyte # : x  Auto Monocyte # : x  Auto Eosinophil # : x  Auto Basophil # : x  Auto Neutrophil % : x  Auto Lymphocyte % : x  Auto Monocyte % : x  Auto Eosinophil % : x  Auto Basophil % : x    -17    142  |  110<H>  |  48<H>  ----------------------------<  201<H>  4.1   |  19<L>  |  2.60<H>    Ca    9.2      2023 23:25  Phos  4.1     06-  Mg     1.7     06-17    TPro  6.2  /  Alb  2.7<L>  /  TBili  1.1  /  DBili  x   /  AST  23  /  ALT  24  /  AlkPhos  71  06-17    PT/INR - ( 2023 18:14 )   PT: 14.1 sec;   INR: 1.18 ratio         PTT - ( 2023 18:14 )  PTT:28.5 sec  Urinalysis Basic - ( 2023 04:52 )    Color: Yellow / Appearance: Clear / S.020 / pH: x  Gluc: x / Ketone: Negative  / Bili: Negative / Urobili: Negative   Blood: x / Protein: 15 mg/dL / Nitrite: Negative   Leuk Esterase: Negative / RBC: 2-5 /HPF / WBC 0-2 /HPF   Sq Epi: x / Non Sq Epi: x / Bacteria: Few /HPF          RADIOLOGY & ADDITIONAL STUDIES REVIEWED:  ***    [ ]GOALS OF CARE DISCUSSION WITH PATIENT/FAMILY/PROXY:    CRITICAL CARE TIME SPENT: 35 minutes

## 2023-06-18 NOTE — PROGRESS NOTE ADULT - ASSESSMENT
76 y/o male with PMHx of HTN , HLD, Asthma, no prior abdominal surgeries, complains of severe generalized abdominal pain and vomiting since about 8 PM.  Pain is described as sharp , persistent in the epigastric area with multiple > 4NBNB vomiting . Last BM was yesterday morning with persistent flatus. Mild leucocytosis, dehydrated , lactate is 2.1 . CT shows SBO. Pt was taken to the OR for diagnostic laproscopy which was converted to exploratory lap with ileostomy due to spillage. ICU was consulted as pt was requiring pressor support during and post surgery.     #SBO  #AHRF  #Pankaj vs PANKAJ on CKD  #HTN  #HLD  #Asthma      Plan:  Neuro:  Currently intubated and Sedated.   Baseline AOx3    Cardiovascular:  #Shock - Hypovolemic vs Septic  Currently requiring pressor support with pheny and amrit  s/p 2.8L in the OR.   will continue with pressor support.   Will titrate down as tolerated    #HTN   Pt has history of HTN on amlodipine.  Will hold in the setting of shock.     Pulmonary:   #Acute Hypoxic Respiratory Failure  Pt was intubated for procedure.   Will continue to keep intubated and sedated.   Daily SBT as tolerated    Infectious Diseases:  #SBO  s/p exploratory laproscopy.   s/p ileostomy   will keep npo   NGT in place   will advance diet as per surgery   Will cover with cefepime and Flagyl secondary to spillage during surgery.       Gastrointestinal:  #SBO  s/p exploratory laproscopy.   s/p ileostomy   will keep npo   NGT in place   will advance diet as per surgery   Will cover with cefepime and Flagyl secondary to spillage during surgery.     Renal:  #PANKAJ VS PANKAJ on CKD  Pt w/ SCr 2.08 on admission  Baseline SCr - Unknown  Trended down to 1.67  F/U Urine Lytes, calculate FeNa  IVF for now, follow BMP daily      Heme/onc:  #No Acitve Issues     Endo:   #HLD  Pt has history of HLD  On Rosuvastatin 10 at home   Will continue as Atorvastatin 40 when given clearance by Surgery to advance diet.     Skin/ catheter: -   Fem central Line 6/17  [place] - 6/17  Caude langley - 6/17    Prophylaxis:   DVT: SCD - Advance to chemical as per surgery.   GI: PPI     Goals of Care: Full Code    Dispo: Will continue to monitor in ICU.      76 y/o male with PMHx of HTN , HLD, Asthma, no prior abdominal surgeries, complains of severe generalized abdominal pain and vomiting since about 8 PM.  Pain is described as sharp , persistent in the epigastric area with multiple > 4NBNB vomiting . Last BM was yesterday morning with persistent flatus. Mild leucocytosis, dehydrated , lactate is 2.1 . CT shows SBO. Pt was taken to the OR for diagnostic laproscopy which was converted to exploratory lap with ileostomy due to spillage. ICU was consulted as pt was requiring pressor support during and post surgery.     #SBO  #AHRF  #Pankaj vs PANKAJ on CKD  #HTN  #HLD  #Asthma      Plan:  Neuro:  Currently intubated and Sedated.   Baseline AOx3    Cardiovascular:  #Shock - Hypovolemic vs Septic  Currently requiring pressor support with levo and vaso  s/p 2.8L in the OR.   will continue with pressor support.   Will titrate down as tolerated  if pt is still hypotensive consider adding epi and ethylene blue if needed     #HTN   Pt has history of HTN on amlodipine.  Will hold in the setting of shock.     Pulmonary:   #Acute Hypoxic Respiratory Failure  Pt was intubated for procedure.   Will continue to keep intubated and sedated.   Daily SBT as tolerated    Infectious Diseases:  #Septic shock 2/2 intraabd infection 2/2 SBO and s/p spillage during surgery  s/p exploratory laproscopy.   s/p ileostomy   will keep npo   NGT in place   will advance diet as per surgery   Will cover with meropenem, caspofungin and Flagyl secondary to spillage during surgery.       Gastrointestinal:  #SBO  s/p exploratory laproscopy.   s/p ileostomy   will keep npo   NGT in place   will advance diet as per surgery   Will cover with cefepime and Flagyl secondary to spillage during surgery.     Renal:  #PANKAJ VS PANKAJ on CKD  Pt w/ SCr 2.08 on admission  Baseline SCr - Unknown  Scr 3.16  F/U Urine Lytes, calculate FeNa  hold IVF due to fluid overload on poccus exam   follow BMP daily    #Metabolic Acidosis  ABG 7.22/30/114/12 06/18  Metabolic acidosis likely due to septic shock  Nephro consulted Dr. Brown  Might need HD   repeat ABG   Consider adding bicarb drip if ph <7.2    Heme/onc:  #No Acitve Issues     Endo:   #HLD  Pt has history of HLD  On Rosuvastatin 10 at home   Will continue as Atorvastatin 40 when given clearance by Surgery to advance diet.     Skin/ catheter: -   Fem central Line 6/17  [place] - 6/17-6/18  R Ax magui - 6/18  Caude langle[place] - 6/17    Prophylaxis:   DVT: SCD - Advance to chemical as per surgery.   GI: PPI     Goals of Care: Full Code    Dispo: Will continue to monitor in ICU.      76 y/o male with PMHx of HTN , HLD, Asthma, no prior abdominal surgeries, complains of severe generalized abdominal pain and vomiting since about 8 PM.  Pain is described as sharp , persistent in the epigastric area with multiple > 4NBNB vomiting . Last BM was yesterday morning with persistent flatus. Mild leucocytosis, dehydrated , lactate is 2.1 . CT shows SBO. Pt was taken to the OR for diagnostic laproscopy which was converted to exploratory lap with ileostomy due to spillage. ICU was consulted as pt was requiring pressor support during and post surgery.     #SBO  #AHRF  #Pankaj vs PANKAJ on CKD  #HTN  #HLD  #Asthma      Plan:  Neuro:  Currently intubated and Sedated.   Baseline AOx3    Cardiovascular:  #Shock - Hypovolemic vs Septic  Currently requiring pressor support with levo and vaso  s/p 2.8L in the OR.   will continue with pressor support.   Will titrate down as tolerated  if pt is still hypotensive consider adding epi and ethylene blue if needed     #HTN   Pt has history of HTN on amlodipine.  Will hold in the setting of shock.     Pulmonary:   #Acute Hypoxic Respiratory Failure  Pt was intubated for procedure.   Will continue to keep intubated and sedated.   Daily SBT as tolerated    Infectious Diseases:  #Septic shock 2/2 intraabd infection 2/2 SBO and s/p spillage during surgery  s/p exploratory laproscopy.   s/p ileostomy   will keep npo   NGT in place   will advance diet as per surgery   Will cover with meropenem, caspofungin and Flagyl secondary to spillage during surgery.       Gastrointestinal:  #SBO  s/p exploratory laproscopy.   s/p ileostomy   will keep npo   NGT in place   will advance diet as per surgery   Will cover with cefepime and Flagyl secondary to spillage during surgery.     Renal:  #PANKAJ VS PANKAJ on CKD  Pt w/ SCr 2.08 on admission  Baseline SCr - Unknown  Scr 3.16  F/U Urine Lytes, calculate FeNa  hold IVF due to fluid overload on poccus exam   follow BMP daily    #Metabolic Acidosis  ABG 7.22/30/114/12 06/18  Metabolic acidosis likely due to septic shock  Nephro consulted Dr. Brown  Might need HD   repeat ABG   s/p 2 amps bicarb    Heme/onc:  #No Acitve Issues     Endo:   #HLD  Pt has history of HLD  On Rosuvastatin 10 at home   Will continue as Atorvastatin 40 when given clearance by Surgery to advance diet.     Skin/ catheter: -   Fem central Line 6/17  [place] - 6/17-6/18  R Ax [place] - 6/18  [place] - 6/17    Prophylaxis:   DVT: SCD - Advance to chemical as per surgery.   GI: PPI     Goals of Care: Full Code    Dispo: Will continue to monitor in ICU.

## 2023-06-18 NOTE — CONSULT NOTE ADULT - ASSESSMENT
1. GA likely 2/2 ATN given urine starting to turn brown and ongoing severe hypotension.  Support with pressors to help renal perfusion to the extent possible.  Would also check intr-abdominal pressure if possible to r/o abdominal compression as well.  2. Acidosis- As of this morning, there is no AG, but given lactate, this will likely change.  pH at this point WNL, but if it drops <7.2, then would need some bicarbonate (pushes or gtt) in order to maintain pH>7.2.  F/u surgery for need for intervention/concern if issue with abdominal perfusion.  3. Shock- pressors per ICU team to maintain renal prefusion.  4. 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 No specific w/u at this time.  No evidence or R [facility].      [facility] NEPHROLOGY  Aneudy Brown M.D.  Raheel Darling D.O.  My Rodriguez M.D.  Nia Limon, PRECIOUS, ANP-C    Telephone: [phone]  Facsimile: [phone] [place] [address], [id number]  [place] [address]

## 2023-06-18 NOTE — CHART NOTE - NSCHARTNOTEFT_GEN_A_CORE
In order to rule out abdominal compartment syndrome, performed bladder pressure which came out 12. In order to check for abdominal compartment syndrome, performed bladder pressure which came out 12.

## 2023-06-18 NOTE — PROGRESS NOTE ADULT - SUBJECTIVE AND OBJECTIVE BOX
Surgery Progress Note    SUBJECTIVE: Pt seen and examined at bedside. Overnight patient with increasing pressor requirements, now on 2 of levo and vaso. Patient also acidotic with lactate 5.2 and new onset ARF. Patient given 6L IVF overnight with no improvement in bp or UO. No bleeding from NGT or ostomy.      Vital Signs Last 24 Hrs  T(C): 37 (2023 07:00), Max: 37 (2023 07:00)  T(F): 98.6 (2023 07:00), Max: 98.6 (2023 07:00)  HR: 106 (2023 08:43) (70 - 149)  BP: 119/73 (2023 14:20) (119/73 - 138/68)  BP(mean): --  RR: 32 (2023 07:00) (18 - 33)  SpO2: 95% (2023 08:43) (73% - 100%)    Parameters below as of 2023 19:30  Patient On (Oxygen Delivery Method): ventilator    O2 Concentration (%): 40    Physical Exam:  General Appearance: intubated and sedated; NGT putting out gastric contents  Respiratory: on mechanical vent  CV: requiring vasopressors  Abdomen: Soft, incisions with dressings c/d/i; ostomy pink and viable with no output in bag    LABS:                        14.7   5.28  )-----------( 186      ( 2023 07:48 )             47.1     06-18    143  |  113<H>  |  47<H>  ----------------------------<  140<H>  5.2   |  19<L>  |  2.87<H>    Ca    7.7<L>      2023 07:48  Phos  4.6     06-18  Mg     1.7     06-18    TPro  5.4<L>  /  Alb  2.4<L>  /  TBili  0.8  /  DBili  x   /  AST  26  /  ALT  23  /  AlkPhos  41  06-18    PT/INR - ( 2023 18:14 )   PT: 14.1 sec;   INR: 1.18 ratio         PTT - ( 2023 18:14 )  PTT:28.5 sec  Urinalysis Basic - ( 2023 04:52 )    Color: Yellow / Appearance: Clear / S.020 / pH: x  Gluc: x / Ketone: Negative  / Bili: Negative / Urobili: Negative   Blood: x / Protein: 15 mg/dL / Nitrite: Negative   Leuk Esterase: Negative / RBC: 2-5 /HPF / WBC 0-2 /HPF   Sq Epi: x / Non Sq Epi: x / Bacteria: Few /HPF        INs and OUTs:    23 @ 07:01  -  23 @ 07:00  --------------------------------------------------------  IN: 2474.8 mL / OUT: 1260 mL / NET: 1214.8 mL

## 2023-06-18 NOTE — CONSULT NOTE ADULT - SUBJECTIVE AND OBJECTIVE BOX
HPI:  78 y/o male with PMHx of HTN , HLD, Asthma, no prior abdominal surgeries, complains of severe generalized abdominal pain and vomiting since about 8 PM.  Pain is described as sharp , persistent in the epigastric area with multiple > 4NBNB vomiting . Last BM was yesterday morning with persistent flatus . Denies fever or chills or dysuria. or any other complaints at this time.  (2023 06:53)      PAST MEDICAL & SURGICAL HISTORY:  Hypertension      History of asthma      History of high cholesterol      No significant past surgical history          Bactrim (Hives; Rash)      Meds:  caspofungin IVPB      chlorhexidine 0.12% Liquid 15 milliLiter(s) Oral Mucosa every 12 hours  chlorhexidine 2% Cloths 1 Application(s) Topical <User Schedule>  EPINEPHrine    Infusion 0.1 MICROgram(s)/kG/Min IV Continuous <Continuous>  meropenem  IVPB 500 milliGRAM(s) IV Intermittent every 12 hours  norepinephrine Infusion 1.8 MICROgram(s)/kG/Min IV Continuous <Continuous>  ondansetron Injectable 4 milliGRAM(s) IV Push every 6 hours PRN  propofol Infusion 30.041 MICROgram(s)/kG/Min IV Continuous <Continuous>  sodium chloride 0.9% lock flush 10 milliLiter(s) IV Push every 1 hour PRN  vasopressin Infusion 0.04 Unit(s)/Min IV Continuous <Continuous>      SOCIAL HISTORY:  Smoker:  YES / NO        PACK YEARS:                         WHEN QUIT?  ETOH use:  YES / NO               FREQUENCY / QUANTITY:  Ilicit Drug use:  YES / NO  Occupation:  Assisted device use (Cane / Walker):  Live with:    FAMILY HISTORY:      VITALS:  Vital Signs Last 24 Hrs  T(C): 36 (2023 17:00), Max: 37.5 (2023 11:00)  T(F): 96.8 (2023 17:00), Max: 99.5 (2023 11:00)  HR: 119 (2023 17:00) (70 - 149)  BP: 97/70 (2023 16:05) (83/71 - 147/72)  BP(mean): 78 (2023 16:05) (74 - 90)  RR: 28 (2023 17:00) (18 - 34)  SpO2: 99% (2023 17:00) (91% - 100%)    Parameters below as of 2023 19:30  Patient On (Oxygen Delivery Method): ventilator    O2 Concentration (%): 40    LABS/DIAGNOSTIC TESTS:                          14.8   9.16  )-----------( 152      ( 2023 16:30 )             44.9     WBC Count: 9.16 K/uL ( @ 16:30)  WBC Count: 7.58 K/uL ( @ 12:00)  WBC Count: 5.28 K/uL ( @ 07:48)  WBC Count: 4.18 K/uL ( @ 03:12)  WBC Count: 2.49 K/uL ( @ 18:14)  WBC Count: 12.78 K/uL ( @ 11:05)  WBC Count: 13.56 K/uL ( @ 06:30)  WBC Count: 16.98 K/uL ( @ 02:52)          139  |  112<H>  |  52<H>  ----------------------------<  195<H>  6.1<H>   |  16<L>  |  3.25<H>    Ca    7.9<L>      2023 16:30  Phos  5.1       Mg     1.8     18    TPro  5.7<L>  /  Alb  2.2<L>  /  TBili  0.7  /  DBili  x   /  AST  37  /  ALT  28  /  AlkPhos  46  18      Urinalysis Basic - ( 2023 04:52 )    Color: Yellow / Appearance: Clear / S.020 / pH: x  Gluc: x / Ketone: Negative  / Bili: Negative / Urobili: Negative   Blood: x / Protein: 15 mg/dL / Nitrite: Negative   Leuk Esterase: Negative / RBC: 2-5 /HPF / WBC 0-2 /HPF   Sq Epi: x / Non Sq Epi: x / Bacteria: Few /HPF        LIVER FUNCTIONS - ( 2023 16:30 )  Alb: 2.2 g/dL / Pro: 5.7 g/dL / ALK PHOS: 46 U/L / ALT: 28 U/L DA / AST: 37 U/L / GGT: x             PT/INR - ( 2023 16:30 )   PT: 17.2 sec;   INR: 1.44 ratio         PTT - ( 2023 16:30 )  PTT:29.7 sec    LACTATE:Lactate, Blood: 5.5 mmol/L ( @ 16:30)  Lactate, Blood: 8.1 mmol/L ( @ 12:00)  Lactate, Blood: 7.0 mmol/L ( @ 08:30)  Lactate, Blood: 4.5 mmol/L ( @ 03:12)  Lactate, Blood: 5.0 mmol/L ( @ 23:25)      ABG - ABG - ( 2023 16:22 )  pH, Arterial: 7.27  pH, Blood: x     /  pCO2: 32    /  pO2: 93    / HCO3: 15    / Base Excess: -11.0 /  SaO2: 99                  CULTURES:       RADIOLOGY:< from: CT Abdomen and Pelvis w/ IV Cont (23 @ 04:55) >  ACC: [id number] EXAM:  CT ABDOMEN AND PELVIS IC   ORDERED BY: TWYLA CRUZ     PROCEDURE DATE:  2023          INTERPRETATION:  CLINICAL INFORMATION: Diffuse abdominal pain and   tenderness. Vomiting. Rule out obstruction.    COMPARISON: None.    CONTRAST/COMPLICATIONS:  IV Contrast: Omnipaque 350  90 cc administered   10 cc discarded  Oral Contrast: NONE  Complications: None reported at time of study completion    PROCEDURE:  CT of the Abdomen and Pelvis was performed.  Sagittal and coronal reformats were performed.    FINDINGS:  LOWER CHEST: Bilateral reticular airspace opacities, evaluation of which   is limited by motion. Coronary artery calcification    LIVER: Low-attenuation lesion within the right hepatic lobe too small to   accurately characterize.  BILE DUCTS: Normal caliber.  GALLBLADDER: Cholelithiasis.  SPLEEN: Within normal limits.  PANCREAS: 1.6 cm cystic lesion within the pancreatic head. No pancreatic   ductal dilatation.  ADRENALS: Within normal limits.  KIDNEYS/URETERS: No hydronephrosis. Bilateral cysts. Low-attenuation   lesion within left kidney too small to accurately characterize.    BLADDER: Minimally distended, limiting evaluation.  REPRODUCTIVE ORGANS: Prostatomegaly, with suggestion of TURP defect    BOWEL: Small bowel obstruction with transition in the right lower   quadrant and the distal ileum (4:83). Stomach and visualized portions of   the distal esophagus are fluid distended Small bowel feces sign just   proximal to the level of the obstruction.Colonic diverticulosis without   evidence of acute diverticulitis.  PERITONEUM: No ascites. Nonspecific haziness of the central mesenteric   fat.  VESSELS: Atherosclerotic changes.  RETROPERITONEUM/LYMPH NODES: No lymphadenopathy.  ABDOMINAL WALL: Within normal limits.  BONES: Degenerative changes.    IMPRESSION:  Small bowel obstruction with transition in the right lower quadrant.    1.6 cm cystic lesion within the pancreatic head. Nonemergent MRI is   recommended for further characterization.    --- End of Report ---            MICHEAL GIRON MD; Attending Radiologist  This document has been electronically signed. 2023  5:32AM    < end of copied text >        ROS  [  ] UNABLE TO ELICIT               HPI:  76 y/o male with PMHx of HTN , HLD, Asthma, no prior abdominal surgeries, complains of severe generalized abdominal pain and vomiting since about 8 PM.  Pain is described as sharp , persistent in the epigastric area with multiple > 4NBNB vomiting . Last BM was yesterday morning with persistent flatus . Denies fever or chills or dysuria  or any other complaints at this time.  (2023 06:53)        History as above, asked to see this patient who was admitted with abdominal pain, and vomiting x 1 day prior to admission and was found to have SBO in the ileal region, he was taken to the OR and was found to have some "dusky" colored ilium that was resected as well as adhesions that were lysed, he had an ileostomy as well. There was some spillage of ileal contents and so is being treated for peritonitis. He is currently in the ICU intubated , sedated , vent dependent on an FIO2 of 40% and PEEP of 5 , he is on 2 pressors currently. His family is at the the bedside. He was initially placed on Meropenem and caspofungin was added for fungal coverage. He has no fevers and his WBC count that was elevated is now WNL. Interestingly enough he has never had any prior abdominal surgeries in the past.      PAST MEDICAL & SURGICAL HISTORY:  Hypertension      History of asthma      History of high cholesterol      No significant past surgical history          Bactrim (Hives; Rash)      Meds:  caspofungin IVPB      chlorhexidine 0.12% Liquid 15 milliLiter(s) Oral Mucosa every 12 hours  chlorhexidine 2% Cloths 1 Application(s) Topical <User Schedule>  EPINEPHrine    Infusion 0.1 MICROgram(s)/kG/Min IV Continuous <Continuous>  meropenem  IVPB 500 milliGRAM(s) IV Intermittent every 12 hours  norepinephrine Infusion 1.8 MICROgram(s)/kG/Min IV Continuous <Continuous>  ondansetron Injectable 4 milliGRAM(s) IV Push every 6 hours PRN  propofol Infusion 30.041 MICROgram(s)/kG/Min IV Continuous <Continuous>  sodium chloride 0.9% lock flush 10 milliLiter(s) IV Push every 1 hour PRN  vasopressin Infusion 0.04 Unit(s)/Min IV Continuous <Continuous>      SOCIAL HISTORY:  Smoker:  ex smoker , quit 40 yrs ago  ETOH use:  no      FAMILY HISTORY: not contributory      VITALS:  Vital Signs Last 24 Hrs  T(C): 36 (2023 17:00), Max: 37.5 (2023 11:00)  T(F): 96.8 (2023 17:00), Max: 99.5 (2023 11:00)  HR: 119 (2023 17:00) (70 - 149)  BP: 97/70 (2023 16:05) (83/71 - 147/72)  BP(mean): 78 (2023 16:05) (74 - 90)  RR: 28 (2023 17:00) (18 - 34)  SpO2: 99% (2023 17:00) (91% - 100%)    Parameters below as of 2023 19:30  Patient On (Oxygen Delivery Method): ventilator    O2 Concentration (%): 40    LABS/DIAGNOSTIC TESTS:                          14.8   9.16  )-----------( 152      ( 2023 16:30 )             44.9     WBC Count: 9.16 K/uL ( @ 16:30)  WBC Count: 7.58 K/uL ( @ 12:00)  WBC Count: 5.28 K/uL ( @ 07:48)  WBC Count: 4.18 K/uL ( @ 03:12)  WBC Count: 2.49 K/uL ( @ 18:14)  WBC Count: 12.78 K/uL ( @ 11:05)  WBC Count: 13.56 K/uL ( @ 06:30)  WBC Count: 16.98 K/uL ( @ 02:52)          139  |  112<H>  |  52<H>  ----------------------------<  195<H>  6.1<H>   |  16<L>  |  3.25<H>    Ca    7.9<L>      2023 16:30  Phos  5.1       Mg     1.8         TPro  5.7<L>  /  Alb  2.2<L>  /  TBili  0.7  /  DBili  x   /  AST  37  /  ALT  28  /  AlkPhos  46        Urinalysis Basic - ( 2023 04:52 )    Color: Yellow / Appearance: Clear / S.020 / pH: x  Gluc: x / Ketone: Negative  / Bili: Negative / Urobili: Negative   Blood: x / Protein: 15 mg/dL / Nitrite: Negative   Leuk Esterase: Negative / RBC: 2-5 /HPF / WBC 0-2 /HPF   Sq Epi: x / Non Sq Epi: x / Bacteria: Few /HPF        LIVER FUNCTIONS - ( 2023 16:30 )  Alb: 2.2 g/dL / Pro: 5.7 g/dL / ALK PHOS: 46 U/L / ALT: 28 U/L DA / AST: 37 U/L / GGT: x             PT/INR - ( 2023 16:30 )   PT: 17.2 sec;   INR: 1.44 ratio         PTT - ( 2023 16:30 )  PTT:29.7 sec    LACTATE:Lactate, Blood: 5.5 mmol/L ( @ 16:30)  Lactate, Blood: 8.1 mmol/L ( @ 12:00)  Lactate, Blood: 7.0 mmol/L ( @ 08:30)  Lactate, Blood: 4.5 mmol/L ( @ 03:12)  Lactate, Blood: 5.0 mmol/L ( @ 23:25)      ABG - ABG - ( 2023 16:22 )  pH, Arterial: 7.27  pH, Blood: x     /  pCO2: 32    /  pO2: 93    / HCO3: 15    / Base Excess: -11.0 /  SaO2: 99                  CULTURES:       RADIOLOGY:< from: CT Abdomen and Pelvis w/ IV Cont (23 @ 04:55) >  ACC: [id number] EXAM:  CT ABDOMEN AND PELVIS IC   ORDERED BY: TWYLA CRUZ     PROCEDURE DATE:  2023          INTERPRETATION:  CLINICAL INFORMATION: Diffuse abdominal pain and   tenderness. Vomiting. Rule out obstruction.    COMPARISON: None.    CONTRAST/COMPLICATIONS:  IV Contrast: Omnipaque 350  90 cc administered   10 cc discarded  Oral Contrast: NONE  Complications: None reported at time of study completion    PROCEDURE:  CT of the Abdomen and Pelvis was performed.  Sagittal and coronal reformats were performed.    FINDINGS:  LOWER CHEST: Bilateral reticular airspace opacities, evaluation of which   is limited by motion. Coronary artery calcification    LIVER: Low-attenuation lesion within the right hepatic lobe too small to   accurately characterize.  BILE DUCTS: Normal caliber.  GALLBLADDER: Cholelithiasis.  SPLEEN: Within normal limits.  PANCREAS: 1.6 cm cystic lesion within the pancreatic head. No pancreatic   ductal dilatation.  ADRENALS: Within normal limits.  KIDNEYS/URETERS: No hydronephrosis. Bilateral cysts. Low-attenuation   lesion within left kidney too small to accurately characterize.    BLADDER: Minimally distended, limiting evaluation.  REPRODUCTIVE ORGANS: Prostatomegaly, with suggestion of TURP defect    BOWEL: Small bowel obstruction with transition in the right lower   quadrant and the distal ileum (4:83). Stomach and visualized portions of   the distal esophagus are fluid distended Small bowel feces sign just   proximal to the level of the obstruction.Colonic diverticulosis without   evidence of acute diverticulitis.  PERITONEUM: No ascites. Nonspecific haziness of the central mesenteric   fat.  VESSELS: Atherosclerotic changes.  RETROPERITONEUM/LYMPH NODES: No lymphadenopathy.  ABDOMINAL WALL: Within normal limits.  BONES: Degenerative changes.    IMPRESSION:  Small bowel obstruction with transition in the right lower quadrant.    1.6 cm cystic lesion within the pancreatic head. Nonemergent MRI is   recommended for further characterization.    --- End of Report ---            MICHEAL GIRON MD; Attending Radiologist  This document has been electronically signed. 2023  5:32AM    < end of copied text >        ROS  [ x] UNABLE TO ELICIT

## 2023-06-18 NOTE — CONSULT NOTE ADULT - SUBJECTIVE AND OBJECTIVE BOX
[facility] NEPHROLOGY- CONSULTATION NOTE    Patient is a 77y Male with unknown baseline renal fxn who presented to the hospital with abdominal pain, was found to have an SBO. Pt was taken to the OR for diagnostic laproscopy which was converted to exploratory lap with ileostomy due to spillage [dx lap converted to ex lap LOU, SBRx2 with ileostomy creation,]. Pt was requiring pressor support during and post surgery.     This morning, pt had severely decreased BP, thought to have septic shock. Pt has had ~5L IVF resuscitation today so far.      PAST MEDICAL & SURGICAL HISTORY:  Hypertension      History of asthma      History of high cholesterol      No significant past surgical history        Bactrim (Hives; Rash)    Home Medications Reviewed  Hospital Medications:   MEDICATIONS  (STANDING):  calcium gluconate IVPB 2 Gram(s) IV Intermittent once  cefepime   IVPB 1000 milliGRAM(s) IV Intermittent every 12 hours  chlorhexidine 0.12% Liquid 15 milliLiter(s) Oral Mucosa every 12 hours  chlorhexidine 2% Cloths 1 Application(s) Topical <User Schedule>  dextrose 50% Injectable 50 milliLiter(s) IV Push once  fentaNYL    Injectable 100 MICROGram(s) IV Push once  insulin regular  human recombinant 10 Unit(s) IV Push once  metroNIDAZOLE  IVPB 500 milliGRAM(s) IV Intermittent every 8 hours  norepinephrine Infusion 1.5 MICROgram(s)/kG/Min (114 mL/Hr) IV Continuous <Continuous>  propofol Infusion 30.041 MICROgram(s)/kG/Min (14.6 mL/Hr) IV Continuous <Continuous>  sodium chloride 0.9%. 1000 milliLiter(s) (100 mL/Hr) IV Continuous <Continuous>  vasopressin Infusion 0.04 Unit(s)/Min (6 mL/Hr) IV Continuous <Continuous>    SOCIAL HISTORY:  Denies ETOh,Smoking,   FAMILY HISTORY:    REVIEW OF SYSTEMS: unable to obtain    ICU Vital Signs Last 24 Hrs  T(C): 37 (2023 07:00), Max: 37 (2023 07:00)  T(F): 98.6 (2023 07:00), Max: 98.6 (2023 07:00)  HR: 111 (2023 07:00) (70 - 149)  BP: 119/73 (2023 14:20) (119/73 - 138/68)  BP(mean): --  ABP: 83/61 (2023 07:00) (73/60 - 163/73)  ABP(mean): 66 (2023 07:00) (53 - 94)  RR: 32 (2023 07:00) (18 - 33)  SpO2: 98% (2023 07:00) (73% - 100%)    O2 Parameters below as of 2023 19:30  Patient On (Oxygen Delivery Method): ventilator    O2 Concentration (%): 40    Weight (kg): 81 ( @ 19:13)      PHYSICAL EXAM:  Constitutional: intubated, sedated  HEENT: anicteric sclera, oropharynx clear, MMM  Neck: No JVD  Respiratory: mechanical BS B  Cardiovascular: tachycardic  Gastrointestinal: +ostomy R upper abdomen, midline incision, mild distention only, soft.  Extremities: No cyanosis or clubbing. No peripheral edema  Neurological: sedated  :  +langley with yellow urine in older portion of collection bag, though new urine seems to be somewhat brown-tinged.  Skin: No rashes      LABS:      143  |  113<H>  |  47<H>  ----------------------------<  140<H>  5.2   |  19<L>  |  2.87<H>    Ca    7.7<L>      2023 07:48  Phos  4.6     06-18  Mg     1.7     06-18    TPro  5.4<L>  /  Alb  2.4<L>  /  TBili  0.8  /  DBili      /  AST  26  /  ALT  23  /  AlkPhos  41  06-18    Creatinine Trend: 2.87 <--, 2.59 <--, 2.60 <--, 2.54 <--, 1.67 <--, 1.69 <--, 2.08 <--             Blood Gas Arterial, Lactate: 5.20                 14.7   5.28  )-----------( 186      ( 2023 07:48 )             47.1     Urine Studies:  Urinalysis Basic - ( 2023 04:52 )    Color: Yellow / Appearance: Clear / S.020 / pH:   Gluc:  / Ketone: Negative  / Bili: Negative / Urobili: Negative   Blood:  / Protein: 15 mg/dL / Nitrite: Negative   Leuk Esterase: Negative / RBC: 2-5 /HPF / WBC 0-2 /HPF   Sq Epi:  / Non Sq Epi:  / Bacteria: Few /HPF    Blood Gas Profile - Arterial (23 @ 07:39)    pH, Arterial: 7.29   pCO2, Arterial: 30 mmHg   pO2, Arterial: 97 mmHg   HCO3, Arterial: 14 mmol/L   Base Excess, Arterial: -10.8 mmol/L   Oxygen Saturation, Arterial: 100 %   FIO2, Arterial: 40.0   Blood Gas Comments Arterial: A/C 16/450/40%/+5  Right Radial        RADIOLOGY & ADDITIONAL STUDIES:        < from: CT Abdomen and Pelvis w/ IV Cont (23 @ 04:55) >    ACC: [id number] EXAM:  CT ABDOMEN AND PELVIS IC   ORDERED BY: TWYLA CRUZ     PROCEDURE DATE:  2023          INTERPRETATION:  CLINICAL INFORMATION: Diffuse abdominal pain and   tenderness. Vomiting. Rule out obstruction.    COMPARISON: None.    CONTRAST/COMPLICATIONS:  IV Contrast: Omnipaque 350  90 cc administered   10 cc discarded  Oral Contrast: NONE  Complications: None reported at time of study completion    PROCEDURE:  CT of the Abdomen and Pelvis was performed.  Sagittal and coronal reformats were performed.    FINDINGS:  LOWER CHEST: Bilateral reticular airspace opacities, evaluation of which   is limited by motion. Coronary artery calcification    LIVER: Low-attenuation lesion within the right hepatic lobe too small to   accurately characterize.  BILE DUCTS: Normal caliber.  GALLBLADDER: Cholelithiasis.  SPLEEN: Within normal limits.  PANCREAS: 1.6 cm cystic lesion within the pancreatic head. No pancreatic   ductal dilatation.  ADRENALS: Within normal limits.  KIDNEYS/URETERS: No hydronephrosis. Bilateral cysts. Low-attenuation   lesion within left kidney too small to accurately characterize.    BLADDER: Minimally distended, limiting evaluation.  REPRODUCTIVE ORGANS: Prostatomegaly, with suggestion of TURP defect    BOWEL: Small bowel obstruction with transition in the right lower   quadrant and the distal ileum (4:83). Stomach and visualized portions of   the distal esophagus are fluid distended Small bowel feces sign just   proximal to the level of the obstruction.Colonic diverticulosis without   evidence of acute diverticulitis.  PERITONEUM: No ascites. Nonspecific haziness of the central mesenteric   fat.  VESSELS: Atherosclerotic changes.  RETROPERITONEUM/LYMPH NODES: No lymphadenopathy.  ABDOMINAL WALL: Within normal limits.  BONES: Degenerative changes.    IMPRESSION:  Small bowel obstruction with transition in the right lower quadrant.    1.6 cm cystic lesion within the pancreatic head. Nonemergent MRI is   recommended for further characterization.    --- End of Report ---            MICHEAL GIRON MD; Attending Radiologist  This document has been electronically signed. 2023  5:32AM    < end of copied text >

## 2023-06-18 NOTE — PROGRESS NOTE ADULT - ASSESSMENT
Mr. Curtis is a 76 y/o male POD1 s/p dx lap converted to ex lap LOU, SBRx2 with ileostomy creation with worsening clinical status.    Plan:  - acute decompensation likely not secondary to abdominal source based on physical exam  - maintain NPO w. NGT to suction  - continue IV abx  - correct acidosis to improve efficacy of pressors  - monitor ostomy  - appreciated ICU care    discussed with Dr. Kuhn

## 2023-06-18 NOTE — CHART NOTE - NSCHARTNOTEFT_GEN_A_CORE
Worsening hypotension during am assessment. Radial lori non-functional, switched to right axillary lori. Surgery resident physician Dr. Klein brought to bedside, no acute abdominal findings. POCUS with Alines diffuse B lines, no clear lung consolidation or effusion. Grossly preserved LV function. Overall concern for worsening sepsis leading to severe shock state and GA/ATN. Antibiotics broadened to Meropenem and Caspofungin. Stress dose steroids deferred after risk/benefit discussion with surgery attending Dr. Kuhn as he is very high risk for leak. Renal consult placed with Dr. Brown due to potential need for HD within next 24-48 hours. Above care performed in collaboration with surgery team and ICU attending Dr. Smith. Extensive update provided to patient's son Jonathan by Dr. Smith.

## 2023-06-19 NOTE — PROGRESS NOTE ADULT - ASSESSMENT
76 y/o male with PMHx of HTN , HLD, Asthma, no prior abdominal surgeries, complains of severe generalized abdominal pain and vomiting since about 8 PM.  Pain is described as sharp , persistent in the epigastric area with multiple > 4NBNB vomiting . Last BM was yesterday morning with persistent flatus. Mild leucocytosis, dehydrated , lactate is 2.1 . CT shows SBO. Pt was taken to the OR for diagnostic laproscopy which was converted to exploratory lap with ileostomy due to spillage. ICU was consulted as pt was requiring pressor support during and post surgery.     #SBO  #AHRF  #Pankaj vs PANKAJ on CKD  #HTN  #HLD  #Asthma      Plan:  Neuro:  Currently intubated and Sedated.   Baseline AOx3    Cardiovascular:  #Shock - Hypovolemic vs Septic  Currently requiring pressor support with levo and vaso  s/p 2.8L in the OR.   will continue with pressor support.   Will titrate down as tolerated  if pt is still hypotensive consider adding epi and ethylene blue if needed     #HTN   Pt has history of HTN on amlodipine.  Will hold in the setting of shock.     Pulmonary:   #Acute Hypoxic Respiratory Failure  Pt was intubated for procedure.   Will continue to keep intubated and sedated.   Daily SBT as tolerated    Infectious Diseases:  #Septic shock 2/2 intraabd infection 2/2 SBO and s/p spillage during surgery  s/p exploratory laproscopy.   s/p ileostomy   will keep npo   NGT in place   will advance diet as per surgery   Will cover with meropenem, caspofungin and Flagyl secondary to spillage during surgery.       Gastrointestinal:  #SBO  s/p exploratory laproscopy.   s/p ileostomy   will keep npo   NGT in place   will advance diet as per surgery   Will cover with cefepime and Flagyl secondary to spillage during surgery.     Renal:  #PANKAJ VS PANKAJ on CKD  Pt w/ SCr 2.08 on admission  Baseline SCr - Unknown  Scr 3.16  F/U Urine Lytes, calculate FeNa  hold IVF due to fluid overload on poccus exam   follow BMP daily    #Metabolic Acidosis  ABG 7.22/30/114/12 06/18  Metabolic acidosis likely due to septic shock  Nephro consulted Dr. Brown  Might need HD   repeat ABG   s/p 2 amps bicarb    Heme/onc:  #No Acitve Issues     Endo:   #HLD  Pt has history of HLD  On Rosuvastatin 10 at home   Will continue as Atorvastatin 40 when given clearance by Surgery to advance diet.     Skin/ catheter: -   Fem central Line 6/17  [place] - 6/17-6/18  R Ax magui - 6/18  Caude langley - 6/17    Prophylaxis:   DVT: SCD - Advance to chemical as per surgery.   GI: PPI     Goals of Care: Full Code    Dispo: Will continue to monitor in ICU.      78 y/o male with PMHx of HTN , HLD, Asthma, no prior abdominal surgeries, complains of severe generalized abdominal pain and vomiting since about 8 PM.  Pain is described as sharp , persistent in the epigastric area with multiple > 4NBNB vomiting . Last BM was yesterday morning with persistent flatus. Mild leucocytosis, dehydrated , lactate is 2.1 . CT shows SBO. Pt was taken to the OR for diagnostic laproscopy which was converted to exploratory lap with ileostomy due to spillage. ICU was consulted as pt was requiring pressor support during and post surgery.     #SBO w/ abdominal perforationg, spillage of gastric contents   #AHRF  #Pankaj vs PANKAJ on CKD  #HTN  #HLD  #Asthma      Plan:  Neuro:  Currently intubated and Sedated.   switched sedation from propofol to versed and fentanyl due to hypertriglyceridemia  Baseline AOx3    Cardiovascular:  #Shock - Septic Shock 2/2 to SBO complicated by abdominal perforation with spillage of gastric contents during surgery  Currently requiring pressor support with levo and vaso  will continue with pressor support.   Will titrate down as tolerated    #HTN   Pt has history of HTN on amlodipine.  Will hold in the setting of shock.     Pulmonary:   #Acute Hypoxic Respiratory Failure 2/2 to fluid overload vs. pneumonia   POCUS exam 6/19- diffuse B-lines, signs of retrocardiac consolidations    pt on meropenem, will cover PNA   Will continue to keep intubated and sedated.   Daily SBT as tolerated    Infectious Diseases:  #Septic Shock 2/2 to SBO complicated by abdominal perforation with spillage of gastric contents during surgery  s/p exploratory laproscopy.   s/p ileostomy   will keep npo   NGT in place, suctioning 550cc in 24 hours    will advance diet as per surgery   Will cover with meropenem, d/c caspofungin       Gastrointestinal:  #SBO  s/p exploratory laproscopy.   s/p ileostomy   will keep npo   NGT in place, suctioning 550cc in 24 hours     will advance diet as per surgery   Will cover with meropenem    Renal:  #PANKAJ VS PANKAJ on CKD  Pt w/ SCr 2.08 on admission  Baseline SCr - Unknown  Scr 3.39  F/U Urine Lytes, calculate FeNa  hold IVF due to fluid overload on pocus exam today   follow BMP daily    #Metabolic Acidosis  ABG 7.27/42/147/19  Metabolic acidosis likely due to septic shock, lactic acidosis   Nephro consulted Dr. Brown  Might need HD   s/p 1 amp of bicarb, bicarb gtt, hyperkalemia cocktail, albuterol     #Hyperkalemia   Potassium 5.7 > 6.2     Heme/onc:  #No Acitve Issues     Endo:   #HLD  Pt has history of HLD  On Rosuvastatin 10 at home   Will continue as Atorvastatin 40 when given clearance by Surgery to advance diet.     Skin/ catheter: -   Fem central Line 6/17  [place] - 6/17-6/18  R Ax magui - 6/18  Caude langley - 6/17    Prophylaxis:   DVT: SCD - Advance to chemical as per surgery.   GI: PPI     Goals of Care: Full Code    Dispo: Will continue to monitor in ICU.      78 y/o male with PMHx of HTN , HLD, Asthma, no prior abdominal surgeries, complains of severe generalized abdominal pain and vomiting since about 8 PM.  Pain is described as sharp , persistent in the epigastric area with multiple > 4NBNB vomiting . Last BM was yesterday morning with persistent flatus. Mild leucocytosis, dehydrated , lactate is 2.1 . CT shows SBO. Pt was taken to the OR for diagnostic laproscopy which was converted to exploratory lap with ileostomy due to spillage. ICU was consulted as pt was requiring pressor support during and post surgery.     #SBO w/ abdominal perforationg, spillage of gastric contents   #AHRF  #Pankaj vs PANKAJ on CKD  #HTN  #HLD  #Asthma      Plan:  Neuro:  Currently intubated and Sedated.   switched sedation from propofol to versed and fentanyl due to hypertriglyceridemia  Baseline AOx3    Cardiovascular:  #Shock - Septic Shock 2/2 to SBO complicated by abdominal perforation with spillage of gastric contents during surgery  Currently requiring pressor support with levo and vaso  will continue with pressor support.   Will titrate down as tolerated    #HTN   Pt has history of HTN on amlodipine.  Will hold in the setting of shock.     Pulmonary:   #Acute Hypoxic Respiratory Failure 2/2 to fluid overload vs. pneumonia   POCUS exam 6/19- diffuse B-lines, signs of retrocardiac consolidations    pt on meropenem, will cover PNA   Will continue to keep intubated and sedated.   Daily SBT as tolerated    Infectious Diseases:  #Septic Shock 2/2 to SBO complicated by abdominal perforation with spillage of gastric contents during surgery  s/p exploratory laproscopy.   s/p ileostomy   will keep npo   NGT in place, suctioning 550cc in 24 hours    will advance diet as per surgery   Will cover with meropenem, d/c caspofungin       Gastrointestinal:  #SBO  s/p exploratory laproscopy.   s/p ileostomy   will keep npo   NGT in place, suctioning 550cc in 24 hours     will advance diet as per surgery   Will cover with meropenem    Renal:  #PANKAJ VS PANKAJ on CKD  Pt w/ SCr 2.08 on admission  Baseline SCr - Unknown  Scr 3.39  F/U Urine Lytes, calculate FeNa  hold IVF due to fluid overload on pocus exam today   follow BMP daily    #Metabolic Acidosis  ABG 7.27/42/147/19  Metabolic acidosis likely due to septic shock, lactic acidosis   Nephro consulted Dr. Brown  Might need HD   s/p 1 amp of bicarb, bicarb gtt, hyperkalemia cocktail, albuterol     #Hyperkalemia   Potassium 5.7 > 6.2   s/p hyperkalemia cocktail, albuterol overnight  given insulin and dextrose during the say  repeat BMP at 2pm     Heme/onc:  #No Acitve Issues     Endo:   #HLD  Pt has history of HLD  On Rosuvastatin 10 at home   Will continue as Atorvastatin 40 when given clearance by Surgery to advance diet.     Skin/ catheter: -   Fem central Line 6/17  [place] - 6/17-6/18  R Ax lori - 6/18  Caude langley - 6/17    Prophylaxis:   DVT: SCD - Advance to chemical as per surgery.   GI: PPI     Goals of Care: Full Code    Dispo: Will continue to monitor in ICU.

## 2023-06-19 NOTE — CHART NOTE - NSCHARTNOTEFT_GEN_A_CORE
Pt started getting hypoxic to 80s overnight. Repeat X-ray chest showed likely developing pneumonia on left side. Pt is on broad spectrum abx. vent setting changed and FiO2 increased to 100%, peep increased to 8. Continue to be on two pressor Levophed and Vasopressin. Called family and updated on patient condition i.e he is very sick, on max vent setting, on broadspectrum abx and too unstable for any procedure. Addressed GOC. Daughter (Jodee Curtis) discussed with the rest of the family and decided to make him DNR. DALILA drafted and placed in chart

## 2023-06-19 NOTE — GOALS OF CARE CONVERSATION - ADVANCED CARE PLANNING - CONVERSATION DETAILS
Pt started getting hypoxic to 80s overnight. Repeat X-ray chest showed likely developing pneumonia on left side. Pt is on broad spectrum abx. vent setting changed and FiO2 increased to 100%, peep increased to 8. Continue to be on two pressor Levophed and Vasopressin. Called family and updated on patient condition i.e he is very sick, on max vent setting, on broadspectrum abx and too unstable for any procedure. Addressed GOC. Daughter (Jodee Curtis) discussed with the rest of the family and decided to make him DNR. DALILA drafted and placed in chart.

## 2023-06-19 NOTE — DIETITIAN INITIAL EVALUATION ADULT - FACTORS AFF FOOD INTAKE
altered GI function of Exploratory laparotomy with small bowel resection, Lysis of intestinal adhesions   Ileostomy 6/17/23/change in mental status/difficulty swallowing

## 2023-06-19 NOTE — PROGRESS NOTE ADULT - ASSESSMENT
1. GA likely 2/2 ATN in the setting of septic shock. Support with pressors to help renal perfusion to the extent possible.  Prior intr-abdominal pressure was not significantly elevated, but if distention wosens, would again check intraabdominal pressure abdominal compression.    2. Volume overload- Pt is starting to get volume overloaded.  For volume overload, would begin bumex gtt.  Given high pressor requirement, pt is not stable for hemodialysis at this time.  3.- Hyperkalemia- temporizing measures continue.  Adding bumex may help as well.  Given high pressor requirement, pt is not stable for hemodialysis at this time.    4. Acidosis- As of this morning, there is no AG, but lactate remains high.  Worsening respiratory acidosis as well.  Pt has been getting bicarbonate pushes and would continue this.        5. Shock- pressors per ICU team to maintain renal perfusion  6. 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 No specific w/u at this time.  No evidence or R DELISA.      [facility]  Aneudy Brown M.D.  Raheel Darling D.O.  My Rodriguez M.D.  Nia Limon, MSN, ANP-C    Telephone: [phone]  Facsimile: [phone] [id number], [address]  [place] [address]

## 2023-06-19 NOTE — PROGRESS NOTE ADULT - SUBJECTIVE AND OBJECTIVE BOX
INTERVAL HPI/OVERNIGHT EVENTS: ***    PRESSORS: [ ] YES [ ] NO  WHICH:    Antimicrobial:  caspofungin IVPB 50 milliGRAM(s) IV Intermittent every 24 hours  caspofungin IVPB      meropenem  IVPB 500 milliGRAM(s) IV Intermittent every 12 hours    Cardiovascular:  norepinephrine Infusion 1.8 MICROgram(s)/kG/Min IV Continuous <Continuous>    Pulmonary:    Hematalogic:    Other:  chlorhexidine 0.12% Liquid 15 milliLiter(s) Oral Mucosa every 12 hours  chlorhexidine 2% Cloths 1 Application(s) Topical <User Schedule>  dextrose 50% Injectable 50 milliLiter(s) IV Push once  fentaNYL    Injectable 100 MICROGram(s) IV Push once  fentaNYL   Infusion. 0.5 MICROgram(s)/kG/Hr IV Continuous <Continuous>  hydrocortisone sodium succinate Injectable 50 milliGRAM(s) IV Push every 6 hours  insulin regular  human recombinant 10 Unit(s) IV Push once  midazolam Infusion 0.02 mG/kG/Hr IV Continuous <Continuous>  midazolam Injectable 4 milliGRAM(s) IV Push once  mupirocin 2% Ointment 1 Application(s) Both Nostrils two times a day  ondansetron Injectable 4 milliGRAM(s) IV Push every 6 hours PRN  propofol Infusion 30.041 MICROgram(s)/kG/Min IV Continuous <Continuous>  sodium chloride 0.9% lock flush 10 milliLiter(s) IV Push every 1 hour PRN  vasopressin Infusion 0.04 Unit(s)/Min IV Continuous <Continuous>    caspofungin IVPB 50 milliGRAM(s) IV Intermittent every 24 hours  caspofungin IVPB      chlorhexidine 0.12% Liquid 15 milliLiter(s) Oral Mucosa every 12 hours  chlorhexidine 2% Cloths 1 Application(s) Topical <User Schedule>  dextrose 50% Injectable 50 milliLiter(s) IV Push once  fentaNYL    Injectable 100 MICROGram(s) IV Push once  fentaNYL   Infusion. 0.5 MICROgram(s)/kG/Hr IV Continuous <Continuous>  hydrocortisone sodium succinate Injectable 50 milliGRAM(s) IV Push every 6 hours  insulin regular  human recombinant 10 Unit(s) IV Push once  meropenem  IVPB 500 milliGRAM(s) IV Intermittent every 12 hours  midazolam Infusion 0.02 mG/kG/Hr IV Continuous <Continuous>  midazolam Injectable 4 milliGRAM(s) IV Push once  mupirocin 2% Ointment 1 Application(s) Both Nostrils two times a day  norepinephrine Infusion 1.8 MICROgram(s)/kG/Min IV Continuous <Continuous>  ondansetron Injectable 4 milliGRAM(s) IV Push every 6 hours PRN  propofol Infusion 30.041 MICROgram(s)/kG/Min IV Continuous <Continuous>  sodium chloride 0.9% lock flush 10 milliLiter(s) IV Push every 1 hour PRN  vasopressin Infusion 0.04 Unit(s)/Min IV Continuous <Continuous>    Drug Dosing Weight  Height (cm): 170 (17 Jun 2023 01:51)  Weight (kg): 81 (17 Jun 2023 19:13)  BMI (kg/m2): 28 (17 Jun 2023 19:13)  BSA (m2): 1.93 (17 Jun 2023 19:13)    CENTRAL LINE: [ ] YES [ ] NO  LOCATION:   DATE INSERTED:  REMOVE: [ ] YES [ ] NO  EXPLAIN:    MCWILLIAMS: [ ] YES [ ] NO    DATE INSERTED:  REMOVE:  [ ] YES [ ] NO  EXPLAIN:    A-LINE:  [ ] YES [ ] NO  LOCATION:   DATE INSERTED:  REMOVE:  [ ] YES [ ] NO  EXPLAIN:    PMH -reviewed admission note, no change since admission  PAST MEDICAL & SURGICAL HISTORY:  Hypertension      History of asthma      History of high cholesterol      No significant past surgical history          ICU Vital Signs Last 24 Hrs  T(C): 35.9 (19 Jun 2023 09:00), Max: 37.3 (18 Jun 2023 11:45)  T(F): 96.6 (19 Jun 2023 09:00), Max: 99.2 (18 Jun 2023 11:45)  HR: 122 (19 Jun 2023 09:44) (99 - 128)  BP: 135/65 (19 Jun 2023 08:00) (97/70 - 164/69)  BP(mean): 84 (19 Jun 2023 08:00) (74 - 92)  ABP: 131/60 (19 Jun 2023 09:00) (67/41 - 182/64)  ABP(mean): 81 (19 Jun 2023 09:00) (50 - 104)  RR: 29 (19 Jun 2023 09:00) (14 - 32)  SpO2: 96% (19 Jun 2023 09:44) (89% - 100%)    O2 Parameters below as of 19 Jun 2023 07:15  Patient On (Oxygen Delivery Method): ventilator    O2 Concentration (%): 90        ABG - ( 19 Jun 2023 10:07 )  pH, Arterial: 7.24  pH, Blood: x     /  pCO2: 42    /  pO2: 98    / HCO3: 18    / Base Excess: -9.0  /  SaO2: 99                    06-18 @ 07:01  -  06-19 @ 07:00  --------------------------------------------------------  IN: 6855.6 mL / OUT: 1660 mL / NET: 5195.6 mL        Mode: AC/ CMV (Assist Control/ Continuous Mandatory Ventilation)  RR (machine): 20  TV (machine): 400  FiO2: 70  PEEP: 8  ITime: 0.8  MAP: 13  PIP: 29      PHYSICAL EXAM:    GENERAL: NAD, well-groomed, well-developed  HEAD:  Atraumatic, Normocephalic  EYES: EOMI, PERRLA, conjunctiva and sclera clear  ENMT: No tonsillar erythema, exudates, or enlargement; Moist mucous membranes, Good dentition, No lesions  NECK: Supple, normal appearance, No JVD; Normal thyroid; Trachea midline  NERVOUS SYSTEM:  Alert & Oriented X3,  Motor Strength 5/5 B/L upper and lower extremities; DTRs 2+ intact and symmetric  CHEST/LUNG: No chest deformity; Normal percussion bilaterally; No rales, rhonchi, wheezing   HEART: Regular rate and rhythm; No murmurs, rubs, or gallops  ABDOMEN: Soft, Nontender, Nondistended; Bowel sounds present  EXTREMITIES:  2+ Peripheral Pulses, No clubbing, cyanosis, or edema  LYMPH: No lymphadenopathy noted  SKIN: No rashes or lesions;  Good capillary refill      LABS:  CBC Full  -  ( 19 Jun 2023 03:25 )  WBC Count : 12.22 K/uL  RBC Count : 4.25 M/uL  Hemoglobin : 12.9 g/dL  Hematocrit : 38.8 %  Platelet Count - Automated : 106 K/uL  Mean Cell Volume : 91.3 fl  Mean Cell Hemoglobin : 30.4 pg  Mean Cell Hemoglobin Concentration : 33.2 gm/dL  Auto Neutrophil # : 10.15 K/uL  Auto Lymphocyte # : 0.45 K/uL  Auto Monocyte # : 0.70 K/uL  Auto Eosinophil # : 0.00 K/uL  Auto Basophil # : 0.01 K/uL  Auto Neutrophil % : 83.1 %  Auto Lymphocyte % : 3.7 %  Auto Monocyte % : 5.7 %  Auto Eosinophil % : 0.0 %  Auto Basophil % : 0.1 %    06-19    144  |  110<H>  |  55<H>  ----------------------------<  177<H>  6.2<HH>   |  19<L>  |  3.47<H>    Ca    7.2<L>      19 Jun 2023 10:00  Phos  6.5     06-19  Mg     1.9     06-19    TPro  4.3<L>  /  Alb  1.6<L>  /  TBili  0.8  /  DBili  x   /  AST  36  /  ALT  26  /  AlkPhos  53  06-19    PT/INR - ( 19 Jun 2023 03:25 )   PT: 20.6 sec;   INR: 1.72 ratio         PTT - ( 19 Jun 2023 03:25 )  PTT:37.0 sec    Culture Results:   10,000 - 49,000 CFU/mL Enterococcus species (06-17 @ 04:52)      RADIOLOGY & ADDITIONAL STUDIES REVIEWED:  ***    [ ]GOALS OF CARE DISCUSSION WITH PATIENT/FAMILY/PROXY:    CRITICAL CARE TIME SPENT: 35 minutes INTERVAL HPI/OVERNIGHT EVENTS: Pt. intubated and sedated. Overnight, pt was found to have increasing oxygen requirements, FiO2 increased, we are titrating down during the day. Pt was given 1.5L bolus due to increased norberto between systolic and diastolic pressures. NG tube suctioning 550cc. No signs of distress. Propofol switched to versed and fentanyl due to increasing TG.     PRESSORS: [x ] YES [ ] NO  WHICH: levo, vaso    Antimicrobial:  caspofungin IVPB 50 milliGRAM(s) IV Intermittent every 24 hours  caspofungin IVPB      meropenem  IVPB 500 milliGRAM(s) IV Intermittent every 12 hours    Cardiovascular:  norepinephrine Infusion 1.8 MICROgram(s)/kG/Min IV Continuous <Continuous>    Pulmonary:    Hematalogic:    Other:  chlorhexidine 0.12% Liquid 15 milliLiter(s) Oral Mucosa every 12 hours  chlorhexidine 2% Cloths 1 Application(s) Topical <User Schedule>  dextrose 50% Injectable 50 milliLiter(s) IV Push once  fentaNYL    Injectable 100 MICROGram(s) IV Push once  fentaNYL   Infusion. 0.5 MICROgram(s)/kG/Hr IV Continuous <Continuous>  hydrocortisone sodium succinate Injectable 50 milliGRAM(s) IV Push every 6 hours  insulin regular  human recombinant 10 Unit(s) IV Push once  midazolam Infusion 0.02 mG/kG/Hr IV Continuous <Continuous>  midazolam Injectable 4 milliGRAM(s) IV Push once  mupirocin 2% Ointment 1 Application(s) Both Nostrils two times a day  ondansetron Injectable 4 milliGRAM(s) IV Push every 6 hours PRN  propofol Infusion 30.041 MICROgram(s)/kG/Min IV Continuous <Continuous>  sodium chloride 0.9% lock flush 10 milliLiter(s) IV Push every 1 hour PRN  vasopressin Infusion 0.04 Unit(s)/Min IV Continuous <Continuous>    caspofungin IVPB 50 milliGRAM(s) IV Intermittent every 24 hours  caspofungin IVPB      chlorhexidine 0.12% Liquid 15 milliLiter(s) Oral Mucosa every 12 hours  chlorhexidine 2% Cloths 1 Application(s) Topical <User Schedule>  dextrose 50% Injectable 50 milliLiter(s) IV Push once  fentaNYL    Injectable 100 MICROGram(s) IV Push once  fentaNYL   Infusion. 0.5 MICROgram(s)/kG/Hr IV Continuous <Continuous>  hydrocortisone sodium succinate Injectable 50 milliGRAM(s) IV Push every 6 hours  insulin regular  human recombinant 10 Unit(s) IV Push once  meropenem  IVPB 500 milliGRAM(s) IV Intermittent every 12 hours  midazolam Infusion 0.02 mG/kG/Hr IV Continuous <Continuous>  midazolam Injectable 4 milliGRAM(s) IV Push once  mupirocin 2% Ointment 1 Application(s) Both Nostrils two times a day  norepinephrine Infusion 1.8 MICROgram(s)/kG/Min IV Continuous <Continuous>  ondansetron Injectable 4 milliGRAM(s) IV Push every 6 hours PRN  propofol Infusion 30.041 MICROgram(s)/kG/Min IV Continuous <Continuous>  sodium chloride 0.9% lock flush 10 milliLiter(s) IV Push every 1 hour PRN  vasopressin Infusion 0.04 Unit(s)/Min IV Continuous <Continuous>    Drug Dosing Weight  Height (cm): 170 (17 Jun 2023 01:51)  Weight (kg): 81 (17 Jun 2023 19:13)  BMI (kg/m2): 28 (17 Jun 2023 19:13)  BSA (m2): 1.93 (17 Jun 2023 19:13)    CENTRAL LINE: [x ] YES [ ] NO  LOCATION:   DATE INSERTED:  REMOVE: [ ] YES [ ] NO  EXPLAIN:    MCWILLIAMS: [x ] YES [ ] NO    DATE INSERTED:  REMOVE:  [ ] YES [ ] NO  EXPLAIN:    A-LINE:  [x ] YES [ ] NO  LOCATION:   DATE INSERTED:  REMOVE:  [ ] YES [ ] NO  EXPLAIN:    PMH -reviewed admission note, no change since admission  PAST MEDICAL & SURGICAL HISTORY:  Hypertension      History of asthma      History of high cholesterol      No significant past surgical history          ICU Vital Signs Last 24 Hrs  T(C): 35.9 (19 Jun 2023 09:00), Max: 37.3 (18 Jun 2023 11:45)  T(F): 96.6 (19 Jun 2023 09:00), Max: 99.2 (18 Jun 2023 11:45)  HR: 122 (19 Jun 2023 09:44) (99 - 128)  BP: 135/65 (19 Jun 2023 08:00) (97/70 - 164/69)  BP(mean): 84 (19 Jun 2023 08:00) (74 - 92)  ABP: 131/60 (19 Jun 2023 09:00) (67/41 - 182/64)  ABP(mean): 81 (19 Jun 2023 09:00) (50 - 104)  RR: 29 (19 Jun 2023 09:00) (14 - 32)  SpO2: 96% (19 Jun 2023 09:44) (89% - 100%)    O2 Parameters below as of 19 Jun 2023 07:15  Patient On (Oxygen Delivery Method): ventilator    O2 Concentration (%): 90        ABG - ( 19 Jun 2023 10:07 )  pH, Arterial: 7.24  pH, Blood: x     /  pCO2: 42    /  pO2: 98    / HCO3: 18    / Base Excess: -9.0  /  SaO2: 99                    06-18 @ 07:01  -  06-19 @ 07:00  --------------------------------------------------------  IN: 6855.6 mL / OUT: 1660 mL / NET: 5195.6 mL        Mode: AC/ CMV (Assist Control/ Continuous Mandatory Ventilation)  RR (machine): 20  TV (machine): 400  FiO2: 70  PEEP: 8  ITime: 0.8  MAP: 13  PIP: 29      PHYSICAL EXAM:    GENERAL: NAD, well-groomed, well-developed, intubated and sedated   HEAD:  Atraumatic, Normocephalic  EYES: EOMI, PERRLA, conjunctiva and sclera clear  ENMT: No tonsillar erythema, exudates, or enlargement; Moist mucous membranes, Good dentition, No lesions  NECK: Supple, normal appearance, No JVD; Normal thyroid; Trachea midline  NERVOUS SYSTEM:  Intubated and sedated, neg gag reflex  CHEST/LUNG: No chest deformity; Normal percussion bilaterally; No rales, rhonchi, wheezing   HEART: Regular rate and rhythm; No murmurs, rubs, or gallops  ABDOMEN: Soft, Nontender, Nondistended; Bowel sounds present, ostomy in place.   EXTREMITIES:  2+ Peripheral Pulses, No clubbing, cyanosis, or edema  LYMPH: No lymphadenopathy noted  SKIN: No rashes or lesions;  Good capillary refill      LABS:  CBC Full  -  ( 19 Jun 2023 03:25 )  WBC Count : 12.22 K/uL  RBC Count : 4.25 M/uL  Hemoglobin : 12.9 g/dL  Hematocrit : 38.8 %  Platelet Count - Automated : 106 K/uL  Mean Cell Volume : 91.3 fl  Mean Cell Hemoglobin : 30.4 pg  Mean Cell Hemoglobin Concentration : 33.2 gm/dL  Auto Neutrophil # : 10.15 K/uL  Auto Lymphocyte # : 0.45 K/uL  Auto Monocyte # : 0.70 K/uL  Auto Eosinophil # : 0.00 K/uL  Auto Basophil # : 0.01 K/uL  Auto Neutrophil % : 83.1 %  Auto Lymphocyte % : 3.7 %  Auto Monocyte % : 5.7 %  Auto Eosinophil % : 0.0 %  Auto Basophil % : 0.1 %    06-19    144  |  110<H>  |  55<H>  ----------------------------<  177<H>  6.2<HH>   |  19<L>  |  3.47<H>    Ca    7.2<L>      19 Jun 2023 10:00  Phos  6.5     06-19  Mg     1.9     06-19    TPro  4.3<L>  /  Alb  1.6<L>  /  TBili  0.8  /  DBili  x   /  AST  36  /  ALT  26  /  AlkPhos  53  06-19    PT/INR - ( 19 Jun 2023 03:25 )   PT: 20.6 sec;   INR: 1.72 ratio         PTT - ( 19 Jun 2023 03:25 )  PTT:37.0 sec    Culture Results:   10,000 - 49,000 CFU/mL Enterococcus species (06-17 @ 04:52)      RADIOLOGY & ADDITIONAL STUDIES REVIEWED:  ***    [ ]GOALS OF CARE DISCUSSION WITH PATIENT/FAMILY/PROXY:    CRITICAL CARE TIME SPENT: 35 minutes

## 2023-06-19 NOTE — DIETITIAN INITIAL EVALUATION ADULT - DIET TYPE
Advance diet or consider alternate nutrition support if NPO prolonged further as medically feasible/regular

## 2023-06-19 NOTE — DIETITIAN INITIAL EVALUATION ADULT - PERTINENT LABORATORY DATA
06-19    144  |  110<H>  |  55<H>  ----------------------------<  177<H>  6.2<HH>   |  19<L>  |  3.47<H>    Ca    7.2<L>      19 Jun 2023 10:00  Phos  6.5     06-19  Mg     1.9     06-19    TPro  4.3<L>  /  Alb  1.6<L>  /  TBili  0.8  /  DBili  x   /  AST  36  /  ALT  26  /  AlkPhos  53  06-19  POCT Blood Glucose.: 108 mg/dL (06-19-23 @ 11:47)

## 2023-06-19 NOTE — PROGRESS NOTE ADULT - SUBJECTIVE AND OBJECTIVE BOX
[facility] NEPHROLOGY- PROGRESS NOTE    Patient is a 77y Male with unknown baseline renal fxn who presented to the hospital with abdominal pain, was found to have an SBO. Pt was taken to the OR for diagnostic laproscopy which was converted to exploratory lap with ileostomy due to spillage [dx lap converted to ex lap LOU, SBRx2 with ileostomy creation,]. Pt was requiring pressor support during and post surgery.     : pt had severely decreased BP, thought to have septic shock. Pt has had ~5L IVF resuscitation today so far.    Pt remains on high dose norepinephrine+ vasoproessin.     REVIEW OF SYSTEMS: unable to obtain    ICU Vital Signs Last 24 Hrs  T(C): 36.8 (2023 19:00), Max: 37 (2023 18:15)  T(F): 98.2 (2023 19:00), Max: 98.6 (2023 18:15)  HR: 118 (2023 19:00) (104 - 128)  BP: 61/42 (2023 16:30) (61/42 - 164/69)  BP(mean): 47 (2023 16:30) (47 - 92)  ABP: 114/59 (2023 19:00) (41/27 - 182/64)  ABP(mean): 75 (2023 19:00) (32 - 104)  RR: 28 (2023 19:00) (14 - 32)  SpO2: 94% (2023 19:00) (88% - 100%)    O2 Parameters below as of 2023 07:15  Patient On (Oxygen Delivery Method): ventilator    O2 Concentration (%): 90        PHYSICAL EXAM:  Constitutional: intubated, sedated  HEENT: anicteric sclera, oropharynx clear, MMM  Neck: No JVD  Respiratory: mechanical BS B  Cardiovascular: tachycardic  Gastrointestinal: +ostomy R upper abdomen, midline incision, + increasing distention but still soft.  Extremities: Mild sacral edema now  Neurological: sedated  :  +langley with light yellow urine         LABS:      144  |  110<H>  |  57<H>  ----------------------------<  202<H>  6.0<H>   |  21<L>  |  3.65<H>    Ca    7.3<L>      2023 14:00  Phos  6.5       Mg     1.9         TPro  4.3<L>  /  Alb  1.6<L>  /  TBili  0.8  /  DBili      /  AST  36  /  ALT  26  /  AlkPhos  53      Creatinine Trend: 3.65 <--, 3.47 <--, 3.39 <--, 3.55 <--, 3.25 <--, 3.16 <--, 2.87 <--, 2.59 <--, 2.60 <--, 2.54 <--, 1.67 <--, 1.69 <--, 2.08 <--                        13.6   22.82 )-----------( 108      ( 2023 14:00 )             41.3     Urine Studies:  Urinalysis Basic - ( 2023 04:52 )    Color: Yellow / Appearance: Clear / S.020 / pH:   Gluc:  / Ketone: Negative  / Bili: Negative / Urobili: Negative   Blood:  / Protein: 15 mg/dL / Nitrite: Negative   Leuk Esterase: Negative / RBC: 2-5 /HPF / WBC 0-2 /HPF   Sq Epi:  / Non Sq Epi:  / Bacteria: Few /HPF      Sodium, Random Urine: 58 mmol/L ( @ 09:50)  Potassium, Random Urine: 54 mmol/L ( @ 09:50)  Creatinine, Random Urine: 76 mg/dL ( @ 09:50)            < from: Xray Chest 1 View- PORTABLE-Urgent (Xray Chest 1 View- PORTABLE-Urgent .) (23 @ 10:28) >    ACC: [id number] EXAM:  XR CHEST PORTABLE URGENT 1V   ORDERED BY: DULCE FARRAR     ACC: [id number] EXAM:  XR CHEST PORTABLE URGENT 1V   ORDERED BY: CATHERINE MCCALL     ACC: [id number] EXAM:  XR CHEST PORTABLE IMMED 1V   ORDERED BY: MARYBETH MURDOCK     PROCEDURE DATE:  2023          INTERPRETATION:  INDICATION: Status post bowel resection. Patient's   hypotensive    Portable chest 2023 at 6:51 PM    COMPARISON: None    FINDINGS:  Heart/Vascular: The heart size, mediastinum, hilum and aorta are within   normal limits for projection.  Pulmonary: Midline trachea. There is mild pulmonary venous congestion and   small effusions. Airspace disease left lower lobe could represent   atelectasis.  Bones: There is no fracture.  Lines and catheter: ET tube mid trachea. Tip of NG tube in fundus.    Portable chest 2023 at 11:20 AM    FINDINGS: ET tube and NG tube in place. Persistent pulmonary venous   congestion small effusions and airspace disease left base. Heart size   stable. Tip of right midline projects in axilla.    Portable chest 2023 11:14 PM    FINDINGS: ET tube, NG tube and right midline catheter is unchanged in   position. No pneumothorax. Heart size appears stable. Persistent mild   pulmonary venous congestion. Increasing opacity left lower lobe   representing enlarging effusion or consolidation. Probable small right   effusion.    Impression:    ET tube, NG tube and right midline catheter is in position    Persistent mild pulmonary venous congestion    Increasing opacity left lower lobe representing enlargement effusion   and/or consolidation. Probable small right effusion.    --- End of Report ---             AMANDO MERINO DO; Attending Radiologist  This document has been electronically signed. 2023 11:15AM    < end of copied text >

## 2023-06-19 NOTE — DIETITIAN INITIAL EVALUATION ADULT - OTHER INFO
Pt lives home PTA, abdominal pain x 1d PTA, s/p GI surgery 6/17/23, visited in ICU, sedated on Vent, NGT for suction with dark bloody, drainage; NPO x 2-3d in-house; Limited intake/wt change history data available at present; Wt data in EMR: 178 lb 6/17/23; 212.9 lb ?? 6/19/23, changes may due to scale/fluid variance; Unknown food allergies per Chart

## 2023-06-19 NOTE — DIETITIAN INITIAL EVALUATION ADULT - PERTINENT MEDS FT
MEDICATIONS  (STANDING):  caspofungin IVPB 50 milliGRAM(s) IV Intermittent every 24 hours  caspofungin IVPB      chlorhexidine 0.12% Liquid 15 milliLiter(s) Oral Mucosa every 12 hours  chlorhexidine 2% Cloths 1 Application(s) Topical <User Schedule>  fentaNYL   Infusion. 0.5 MICROgram(s)/kG/Hr (4.05 mL/Hr) IV Continuous <Continuous>  hydrocortisone sodium succinate Injectable 50 milliGRAM(s) IV Push every 6 hours  meropenem  IVPB 500 milliGRAM(s) IV Intermittent every 12 hours  midazolam Infusion 0.02 mG/kG/Hr (1.62 mL/Hr) IV Continuous <Continuous>  mupirocin 2% Ointment 1 Application(s) Both Nostrils two times a day  norepinephrine Infusion 1.8 MICROgram(s)/kG/Min (137 mL/Hr) IV Continuous <Continuous>  propofol Infusion 30.041 MICROgram(s)/kG/Min (14.6 mL/Hr) IV Continuous <Continuous>  vasopressin Infusion 0.04 Unit(s)/Min (6 mL/Hr) IV Continuous <Continuous>    MEDICATIONS  (PRN):  ondansetron Injectable 4 milliGRAM(s) IV Push every 6 hours PRN Nausea  sodium chloride 0.9% lock flush 10 milliLiter(s) IV Push every 1 hour PRN Pre/post blood products, medications, blood draw, and to maintain line patency

## 2023-06-19 NOTE — PROGRESS NOTE ADULT - ASSESSMENT
Assessment and Plan: 	  Mr. Curtis is a 78 y/o male POD1 s/p dx lap converted to ex lap LOU, SBRx2 with ileostomy creation.  ostomy pink/moist viable  pna, ventilator support, saturating well.      Plan:  - NGT to suction  - pt on IV abx  -local wound care  - monitor for ostomy output,   - on pressor support, care per icu team

## 2023-06-19 NOTE — DIETITIAN INITIAL EVALUATION ADULT - NSFNSGIIOFT_GEN_A_CORE
06-18-23 @ 07:01  -  06-19-23 @ 07:00  --------------------------------------------------------  OUT:    Ileostomy (mL): 10 mL    Nasogastric/Oral tube (mL): 570 mL  Total OUT: 580 mL    Total NET: -580 mL

## 2023-06-19 NOTE — DIETITIAN INITIAL EVALUATION ADULT - ETIOLOGY
altered GI function of Exploratory laparotomy with small bowel resection, Lysis of intestinal adhesions, Ileostomy

## 2023-06-19 NOTE — PROGRESS NOTE ADULT - SUBJECTIVE AND OBJECTIVE BOX
Pt on pressors x2 , intubated, sedated. u/o improving approx 45cc/hr    ICU Vital Signs Last 24 Hrs  T(C): 36.4 (19 Jun 2023 08:00), Max: 37.5 (18 Jun 2023 11:00)  T(F): 97.5 (19 Jun 2023 08:00), Max: 99.5 (18 Jun 2023 11:00)  HR: 114 (19 Jun 2023 08:00) (99 - 128)  BP: 135/65 (19 Jun 2023 08:00) (83/71 - 164/69)  BP(mean): 84 (19 Jun 2023 08:00) (74 - 92)  ABP: 116/58 (19 Jun 2023 08:00) (67/41 - 182/64)  ABP(mean): 76 (19 Jun 2023 08:00) (50 - 104)  RR: 30 (19 Jun 2023 08:00) (14 - 32)  SpO2: 98% (19 Jun 2023 08:00) (89% - 100%)        Physical Exam:  General Appearance: intubated and sedated; NGT putting out gastric contents  Respiratory: on mechanical vent  CV: requiring vasopressors  Abdomen: Soft, incisions with dressings c/d/i; ostomy pink and viable with minimal clear fluid output in bag    LABS:                           12.9   12.22 )-----------( 106      ( 19 Jun 2023 03:25 )             38.8   06-19    143  |  111<H>  |  54<H>  ----------------------------<  228<H>  5.7<H>   |  20<L>  |  3.39<H>    Ca    7.6<L>      19 Jun 2023 03:25  Phos  5.9     06-19  Mg     1.7     06-19    TPro  4.3<L>  /  Alb  1.6<L>  /  TBili  0.8  /  DBili  x   /  AST  36  /  ALT  26  /  AlkPhos  53  06-19      Lactate, Blood (06.19.23 @ 05:44)   Lactate, Blood: 5.6: TYPE:(C=Critical, N=Notification, A=Abnormal) c       I&O's Detail    18 Jun 2023 07:01  -  19 Jun 2023 07:00  --------------------------------------------------------  IN:    IV PiggyBack: 250 mL    IV PiggyBack: 100 mL    Lactated Ringers Bolus: 1500 mL    Norepinephrine: 548 mL    Norepinephrine: 411 mL    Norepinephrine: 1851.6 mL    Norepinephrine: 803.8 mL    Propofol: 653.2 mL    Sodium Bicarbonate: 600 mL    Vasopressin: 138 mL  Total IN: 6855.6 mL    OUT:    Ileostomy (mL): 10 mL    Indwelling Catheter - Urethral (mL): 1080 mL    Nasogastric/Oral tube (mL): 570 mL  Total OUT: 1660 mL    Total NET: 5195.6 mL

## 2023-06-20 NOTE — PROCEDURE NOTE - NSPOSTPRCRAD_GEN_A_CORE
central line located in the/central line located in the superior vena cava central line located in the/central line located in the superior vena cava/no pneumothorax/post-procedure radiography performed

## 2023-06-20 NOTE — PROGRESS NOTE ADULT - SUBJECTIVE AND OBJECTIVE BOX
Pt on pressors x2 , intubated, sedated      ICU Vital Signs Last 24 Hrs  T(C): 36.5 (20 Jun 2023 10:00), Max: 37 (19 Jun 2023 18:15)  T(F): 97.7 (20 Jun 2023 10:00), Max: 98.6 (19 Jun 2023 18:15)  HR: 101 (20 Jun 2023 10:00) (100 - 169)  BP: 146/66 (20 Jun 2023 04:00) (61/42 - 146/66)  BP(mean): 84 (20 Jun 2023 04:00) (47 - 100)  ABP: 134/59 (20 Jun 2023 10:00) (41/27 - 153/69)  ABP(mean): 82 (20 Jun 2023 10:00) (32 - 96)  RR: 27 (20 Jun 2023 10:00) (22 - 32)  SpO2: 95% (20 Jun 2023 10:00) (88% - 100%)    O2 Parameters below as of 20 Jun 2023 10:00  Patient On (Oxygen Delivery Method): ventilato          Physical Exam:  General Appearance: intubated and sedated; NGT putting out gastric contents     Respiratory: on mechanical vent  CV: requiring vasopressors  Abdomen: Soft, incisions with dressings c/d/i; ostomy pink and viable with minimal clear fluid output in bag  wound: repacked between stapled, no purulence no bleed.    LABS:                                             12.5   17.61 )-----------( 88       ( 20 Jun 2023 03:56 )             38.1   06-20    147<H>  |  111<H>  |  62<H>  ----------------------------<  160<H>  5.4<H>   |  22  |  3.90<H>    Ca    8.1<L>      20 Jun 2023 03:56  Phos  6.6     06-20  Mg     2.0     06-20    TPro  5.1<L>  /  Alb  1.9<L>  /  TBili  1.0  /  DBili  x   /  AST  67<H>  /  ALT  62<H>  /  AlkPhos  103  06-20          Lactate, Blood (06.19.23 @ 05:44)   Lactate, Blood: 5.6: TYPE:(C=Critical, N=Notification, A=Abnormal) c       I&O's Detail    19 Jun 2023 07:01  -  20 Jun 2023 07:00  --------------------------------------------------------  IN:    Bumetanide: 65 mL    FentaNYL: 121.5 mL    IV PiggyBack: 100 mL    IV PiggyBack: 250 mL    IV PiggyBack: 100 mL    IV PiggyBack: 200 mL    IV PiggyBack: 100 mL    IV PiggyBack: 100 mL    Midazolam: 22.4 mL    Midazolam: 38.4 mL    Norepinephrine: 3692.6 mL    Propofol: 87.6 mL    Sodium Bicarbonate: 100 mL    Vasopressin: 132 mL    Vasopressin: 12 mL  Total IN: 5121.5 mL    OUT:    Ileostomy (mL): 0 mL    Indwelling Catheter - Urethral (mL): 2590 mL    Nasogastric/Oral tube (mL): 800 mL  Total OUT: 3390 mL    Total NET: 1731.5 mL      20 Jun 2023 07:01  -  20 Jun 2023 10:16  --------------------------------------------------------  IN:    Bumetanide: 5 mL    FentaNYL: 8.1 mL    IV PiggyBack: 260 mL    IV PiggyBack: 100 mL    Midazolam: 3.2 mL    Norepinephrine: 130 mL    Vasopressin: 6 mL  Total IN: 512.3 mL    OUT:    Indwelling Catheter - Urethral (mL): 400 mL  Total OUT: 400 mL    Total NET: 112.3 mL

## 2023-06-20 NOTE — PROGRESS NOTE ADULT - ASSESSMENT
Patient is a 77y Male with HTN , HLD, Asthma p/w abd pain a/w SBO s/p OR 6/17 dx lap converted to ex lap LOU, SBRx2 with ileostomy creation due to spillage. Pt with Septic shock, hypotension on pressors with GA and hyperkalemia. Nephrology consulted for Elevated serum creatinine.    1. GA likely 2/2 ATN in the setting of septic shock. Support with pressors to help renal perfusion to the extent possible.  Prior intra-abdominal pressure was not significantly elevated, but if distention worsens, would again check intraabdominal pressure abdominal compression.  Strict I/Os. Avoid nephrotoxins/ NSAIDs/ RCA. Monitor BMP.    2. Volume overload- Pt with good urine o/p on bumex gtt.   Monitor urine o/p    3. Hyperkalemia- temporizing measures continue.  Serum Na improving on bumex.  Monitor serum potassium    4. Acidosis- serum CO2 improving. There is no AG, but lactate remains high.  s/p bicarbonate pushes. Monitor serum Co2/ph    5. Septic Shock- antibiotics and pressors per ICU team. c/w pressors to maintain renal perfusion    6. 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 No specific w/u at this time.  No evidence or R DELISA.      [facility] NEPHROLOGY  Aneudy Brown M.D.  Raheel Darling D.O.  My Rodriguez M.D.  Nia Limon, MSN, ANP-C    Telephone: [phone]  Facsimile: [phone]    [facility] [address] [place] [address], [address]  [place] [address]

## 2023-06-20 NOTE — PROGRESS NOTE ADULT - SUBJECTIVE AND OBJECTIVE BOX
INTERVAL HPI/OVERNIGHT EVENTS: ***    PRESSORS: [ ] YES [ ] NO  WHICH:    Antimicrobial:  caspofungin IVPB 50 milliGRAM(s) IV Intermittent every 24 hours  caspofungin IVPB      meropenem  IVPB 500 milliGRAM(s) IV Intermittent every 12 hours    Cardiovascular:  buMETAnide Infusion 1 mG/Hr IV Continuous <Continuous>  norepinephrine Infusion 1.8 MICROgram(s)/kG/Min IV Continuous <Continuous>    Pulmonary:    Hematalogic:    Other:  albumin human 25% IVPB 50 milliLiter(s) IV Intermittent every 8 hours  chlorhexidine 0.12% Liquid 15 milliLiter(s) Oral Mucosa every 12 hours  chlorhexidine 2% Cloths 1 Application(s) Topical <User Schedule>  fentaNYL   Infusion. 0.5 MICROgram(s)/kG/Hr IV Continuous <Continuous>  hydrocortisone sodium succinate Injectable 50 milliGRAM(s) IV Push every 6 hours  midazolam Infusion 0.04 mG/kG/Hr IV Continuous <Continuous>  mupirocin 2% Ointment 1 Application(s) Both Nostrils two times a day  ondansetron Injectable 4 milliGRAM(s) IV Push every 6 hours PRN  sodium chloride 0.9% lock flush 10 milliLiter(s) IV Push every 1 hour PRN  vasopressin Infusion 0.04 Unit(s)/Min IV Continuous <Continuous>    albumin human 25% IVPB 50 milliLiter(s) IV Intermittent every 8 hours  buMETAnide Infusion 1 mG/Hr IV Continuous <Continuous>  caspofungin IVPB      caspofungin IVPB 50 milliGRAM(s) IV Intermittent every 24 hours  chlorhexidine 0.12% Liquid 15 milliLiter(s) Oral Mucosa every 12 hours  chlorhexidine 2% Cloths 1 Application(s) Topical <User Schedule>  fentaNYL   Infusion. 0.5 MICROgram(s)/kG/Hr IV Continuous <Continuous>  hydrocortisone sodium succinate Injectable 50 milliGRAM(s) IV Push every 6 hours  meropenem  IVPB 500 milliGRAM(s) IV Intermittent every 12 hours  midazolam Infusion 0.04 mG/kG/Hr IV Continuous <Continuous>  mupirocin 2% Ointment 1 Application(s) Both Nostrils two times a day  norepinephrine Infusion 1.8 MICROgram(s)/kG/Min IV Continuous <Continuous>  ondansetron Injectable 4 milliGRAM(s) IV Push every 6 hours PRN  sodium chloride 0.9% lock flush 10 milliLiter(s) IV Push every 1 hour PRN  vasopressin Infusion 0.04 Unit(s)/Min IV Continuous <Continuous>    Drug Dosing Weight  Height (cm): 170 (17 Jun 2023 01:51)  Weight (kg): 81 (17 Jun 2023 19:13)  BMI (kg/m2): 28 (17 Jun 2023 19:13)  BSA (m2): 1.93 (17 Jun 2023 19:13)    CENTRAL LINE: [ ] YES [ ] NO  LOCATION:   DATE INSERTED:  REMOVE: [ ] YES [ ] NO  EXPLAIN:    MCWILLIAMS: [ ] YES [ ] NO    DATE INSERTED:  REMOVE:  [ ] YES [ ] NO  EXPLAIN:    A-LINE:  [ ] YES [ ] NO  LOCATION:   DATE INSERTED:  REMOVE:  [ ] YES [ ] NO  EXPLAIN:    PMH -reviewed admission note, no change since admission  PAST MEDICAL & SURGICAL HISTORY:  Hypertension      History of asthma      History of high cholesterol      No significant past surgical history          ICU Vital Signs Last 24 Hrs  T(C): 36.3 (20 Jun 2023 07:15), Max: 37 (19 Jun 2023 18:15)  T(F): 97.3 (20 Jun 2023 07:15), Max: 98.6 (19 Jun 2023 18:15)  HR: 103 (20 Jun 2023 07:15) (103 - 169)  BP: 146/66 (20 Jun 2023 04:00) (61/42 - 146/66)  BP(mean): 84 (20 Jun 2023 04:00) (47 - 100)  ABP: 132/56 (20 Jun 2023 07:15) (41/27 - 141/58)  ABP(mean): 79 (20 Jun 2023 07:15) (32 - 92)  RR: 28 (20 Jun 2023 07:15) (22 - 32)  SpO2: 98% (20 Jun 2023 07:15) (88% - 100%)    O2 Parameters below as of 20 Jun 2023 07:00  Patient On (Oxygen Delivery Method): ventilator    O2 Concentration (%): 50        ABG - ( 20 Jun 2023 03:55 )  pH, Arterial: 7.31  pH, Blood: x     /  pCO2: 38    /  pO2: 109   / HCO3: 19    / Base Excess: -6.6  /  SaO2: 100                   06-19 @ 07:01  -  06-20 @ 07:00  --------------------------------------------------------  IN: 5121.5 mL / OUT: 3240 mL / NET: 1881.5 mL        Mode: AC/ CMV (Assist Control/ Continuous Mandatory Ventilation)  RR (machine): 28  TV (machine): 400  FiO2: 50  PEEP: 8  ITime: 1  MAP: 15  PIP: 31      PHYSICAL EXAM:    GENERAL: NAD, well-groomed, well-developed  HEAD:  Atraumatic, Normocephalic  EYES: EOMI, PERRLA, conjunctiva and sclera clear  ENMT: No tonsillar erythema, exudates, or enlargement; Moist mucous membranes, Good dentition, No lesions  NECK: Supple, normal appearance, No JVD; Normal thyroid; Trachea midline  NERVOUS SYSTEM:  Alert & Oriented X3,  Motor Strength 5/5 B/L upper and lower extremities; DTRs 2+ intact and symmetric  CHEST/LUNG: No chest deformity; Normal percussion bilaterally; No rales, rhonchi, wheezing   HEART: Regular rate and rhythm; No murmurs, rubs, or gallops  ABDOMEN: Soft, Nontender, Nondistended; Bowel sounds present  EXTREMITIES:  2+ Peripheral Pulses, No clubbing, cyanosis, or edema  LYMPH: No lymphadenopathy noted  SKIN: No rashes or lesions;  Good capillary refill      LABS:  CBC Full  -  ( 20 Jun 2023 03:56 )  WBC Count : 17.61 K/uL  RBC Count : 4.28 M/uL  Hemoglobin : 12.5 g/dL  Hematocrit : 38.1 %  Platelet Count - Automated : 88 K/uL  Mean Cell Volume : 89.0 fl  Mean Cell Hemoglobin : 29.2 pg  Mean Cell Hemoglobin Concentration : 32.8 gm/dL  Auto Neutrophil # : x  Auto Lymphocyte # : x  Auto Monocyte # : x  Auto Eosinophil # : x  Auto Basophil # : x  Auto Neutrophil % : x  Auto Lymphocyte % : x  Auto Monocyte % : x  Auto Eosinophil % : x  Auto Basophil % : x    06-20    147<H>  |  111<H>  |  62<H>  ----------------------------<  160<H>  5.4<H>   |  22  |  3.90<H>    Ca    8.1<L>      20 Jun 2023 03:56  Phos  6.6     06-20  Mg     2.0     06-20    TPro  5.1<L>  /  Alb  1.9<L>  /  TBili  1.0  /  DBili  x   /  AST  67<H>  /  ALT  62<H>  /  AlkPhos  103  06-20    PT/INR - ( 20 Jun 2023 03:56 )   PT: 18.3 sec;   INR: 1.53 ratio         PTT - ( 19 Jun 2023 03:25 )  PTT:37.0 sec    Culture Results:   No growth to date. (06-18 @ 16:35)  Culture Results:   No growth to date. (06-18 @ 16:30)      RADIOLOGY & ADDITIONAL STUDIES REVIEWED:  ***    [ ]GOALS OF CARE DISCUSSION WITH PATIENT/FAMILY/PROXY:    CRITICAL CARE TIME SPENT: 35 minutes INTERVAL HPI/OVERNIGHT EVENTS: Overnight, pt received 2 hyperkalemia cocktails. 1st one with albuterol, then pt. became tachycardic to 160-170s, given amiodarone 150mg IV x1, HR normalized afterwards. Pt with increased urine output as well overnight.     PRESSORS: [ x] YES [ ] NO  WHICH: levo 1.79 mg/kg/min, vaso 0.04 mg/kg/min    Antimicrobial:  meropenem  IVPB 500 milliGRAM(s) IV Intermittent every 12 hours    Cardiovascular:  buMETAnide Infusion 1 mG/Hr IV Continuous <Continuous>  norepinephrine Infusion 1.8 MICROgram(s)/kG/Min IV Continuous <Continuous>    Pulmonary:    Hematalogic:    Other:  albumin human 25% IVPB 50 milliLiter(s) IV Intermittent every 8 hours  chlorhexidine 0.12% Liquid 15 milliLiter(s) Oral Mucosa every 12 hours  chlorhexidine 2% Cloths 1 Application(s) Topical <User Schedule>  fentaNYL   Infusion. 0.5 MICROgram(s)/kG/Hr IV Continuous <Continuous>  hydrocortisone sodium succinate Injectable 50 milliGRAM(s) IV Push every 6 hours  midazolam Infusion 0.04 mG/kG/Hr IV Continuous <Continuous>  mupirocin 2% Ointment 1 Application(s) Both Nostrils two times a day  ondansetron Injectable 4 milliGRAM(s) IV Push every 6 hours PRN  sodium chloride 0.9% lock flush 10 milliLiter(s) IV Push every 1 hour PRN  vasopressin Infusion 0.04 Unit(s)/Min IV Continuous <Continuous>    albumin human 25% IVPB 50 milliLiter(s) IV Intermittent every 8 hours  buMETAnide Infusion 1 mG/Hr IV Continuous <Continuous>  caspofungin IVPB      caspofungin IVPB 50 milliGRAM(s) IV Intermittent every 24 hours  chlorhexidine 0.12% Liquid 15 milliLiter(s) Oral Mucosa every 12 hours  chlorhexidine 2% Cloths 1 Application(s) Topical <User Schedule>  fentaNYL   Infusion. 0.5 MICROgram(s)/kG/Hr IV Continuous <Continuous>  hydrocortisone sodium succinate Injectable 50 milliGRAM(s) IV Push every 6 hours  meropenem  IVPB 500 milliGRAM(s) IV Intermittent every 12 hours  midazolam Infusion 0.04 mG/kG/Hr IV Continuous <Continuous>  mupirocin 2% Ointment 1 Application(s) Both Nostrils two times a day  norepinephrine Infusion 1.8 MICROgram(s)/kG/Min IV Continuous <Continuous>  ondansetron Injectable 4 milliGRAM(s) IV Push every 6 hours PRN  sodium chloride 0.9% lock flush 10 milliLiter(s) IV Push every 1 hour PRN  vasopressin Infusion 0.04 Unit(s)/Min IV Continuous <Continuous>    Drug Dosing Weight  Height (cm): 170 (17 Jun 2023 01:51)  Weight (kg): 81 (17 Jun 2023 19:13)  BMI (kg/m2): 28 (17 Jun 2023 19:13)  BSA (m2): 1.93 (17 Jun 2023 19:13)    CENTRAL LINE: x[ ] YES [ ] NO  LOCATION:   DATE INSERTED: 6/17/23  REMOVE: [x ] YES [ ] NO  EXPLAIN: will place IJ today 6/20    MCWILLIAMS: [x ] YES [ ] NO    DATE INSERTED:  REMOVE:  [ ] YES [ ] NO  EXPLAIN:    A-LINE:  [x ] YES [ ] NO  LOCATION:   DATE INSERTED: 6/18/23  REMOVE:  [ ] YES [ ] NO  EXPLAIN:    PMH -reviewed admission note, no change since admission  PAST MEDICAL & SURGICAL HISTORY:  Hypertension      History of asthma      History of high cholesterol      No significant past surgical history          ICU Vital Signs Last 24 Hrs  T(C): 36.3 (20 Jun 2023 07:15), Max: 37 (19 Jun 2023 18:15)  T(F): 97.3 (20 Jun 2023 07:15), Max: 98.6 (19 Jun 2023 18:15)  HR: 103 (20 Jun 2023 07:15) (103 - 169)  BP: 146/66 (20 Jun 2023 04:00) (61/42 - 146/66)  BP(mean): 84 (20 Jun 2023 04:00) (47 - 100)  ABP: 132/56 (20 Jun 2023 07:15) (41/27 - 141/58)  ABP(mean): 79 (20 Jun 2023 07:15) (32 - 92)  RR: 28 (20 Jun 2023 07:15) (22 - 32)  SpO2: 98% (20 Jun 2023 07:15) (88% - 100%)    O2 Parameters below as of 20 Jun 2023 07:00  Patient On (Oxygen Delivery Method): ventilator    O2 Concentration (%): 50        ABG - ( 20 Jun 2023 03:55 )  pH, Arterial: 7.31  pH, Blood: x     /  pCO2: 38    /  pO2: 109   / HCO3: 19    / Base Excess: -6.6  /  SaO2: 100                   06-19 @ 07:01  -  06-20 @ 07:00  --------------------------------------------------------  IN: 5121.5 mL / OUT: 3240 mL / NET: 1881.5 mL        Mode: AC/ CMV (Assist Control/ Continuous Mandatory Ventilation)  RR (machine): 28  TV (machine): 400  FiO2: 50  PEEP: 8  ITime: 1  MAP: 15  PIP: 31      PHYSICAL EXAM:    GENERAL: NAD, intubated and sedated   HEAD:  Atraumatic, Normocephalic  EYES: EOMI, 1mm pupils b/l, PERRLA, conjunctiva and sclera clear  ENMT: No tonsillar erythema, exudates, or enlargement; Moist mucous membranes, Good dentition, No lesions  NECK: Supple, normal appearance, No JVD; Normal thyroid; Trachea midline  NERVOUS SYSTEM:  Sedated, no withdrawal to pain, neg gag reflex   CHEST/LUNG: No chest deformity; Normal percussion bilaterally; No rales, rhonchi, wheezing   HEART: Regular rate and rhythm; No murmurs, rubs, or gallops  ABDOMEN: Soft, Nontender, distended ; Bowel sounds present, ostomy in place, surgical scar intact  EXTREMITIES:  2+ Peripheral Pulses, No clubbing, cyanosis, or edema  LYMPH: No lymphadenopathy noted  SKIN: No rashes or lesions;  Good capillary refill      LABS:  CBC Full  -  ( 20 Jun 2023 03:56 )  WBC Count : 17.61 K/uL  RBC Count : 4.28 M/uL  Hemoglobin : 12.5 g/dL  Hematocrit : 38.1 %  Platelet Count - Automated : 88 K/uL  Mean Cell Volume : 89.0 fl  Mean Cell Hemoglobin : 29.2 pg  Mean Cell Hemoglobin Concentration : 32.8 gm/dL  Auto Neutrophil # : x  Auto Lymphocyte # : x  Auto Monocyte # : x  Auto Eosinophil # : x  Auto Basophil # : x  Auto Neutrophil % : x  Auto Lymphocyte % : x  Auto Monocyte % : x  Auto Eosinophil % : x  Auto Basophil % : x    06-20    147<H>  |  111<H>  |  62<H>  ----------------------------<  160<H>  5.4<H>   |  22  |  3.90<H>    Ca    8.1<L>      20 Jun 2023 03:56  Phos  6.6     06-20  Mg     2.0     06-20    TPro  5.1<L>  /  Alb  1.9<L>  /  TBili  1.0  /  DBili  x   /  AST  67<H>  /  ALT  62<H>  /  AlkPhos  103  06-20    PT/INR - ( 20 Jun 2023 03:56 )   PT: 18.3 sec;   INR: 1.53 ratio         PTT - ( 19 Jun 2023 03:25 )  PTT:37.0 sec    Culture Results:   No growth to date. (06-18 @ 16:35)  Culture Results:   No growth to date. (06-18 @ 16:30)      RADIOLOGY & ADDITIONAL STUDIES REVIEWED:  ***    [ ]GOALS OF CARE DISCUSSION WITH PATIENT/FAMILY/PROXY:    CRITICAL CARE TIME SPENT: 35 minutes

## 2023-06-20 NOTE — PROGRESS NOTE ADULT - ASSESSMENT
78 y/o male with PMHx of HTN , HLD, Asthma, no prior abdominal surgeries, complains of severe generalized abdominal pain and vomiting since about 8 PM.  Pain is described as sharp , persistent in the epigastric area with multiple > 4NBNB vomiting . Last BM was yesterday morning with persistent flatus. Mild leucocytosis, dehydrated , lactate is 2.1 . CT shows SBO. Pt was taken to the OR for diagnostic laproscopy which was converted to exploratory lap with ileostomy due to spillage. ICU was consulted as pt was requiring pressor support during and post surgery.     #SBO w/ abdominal perforationg, spillage of gastric contents   #AHRF  #Pankaj vs PANKAJ on CKD  #HTN  #HLD  #Asthma      Plan:  Neuro:  Currently intubated and Sedated.   switched sedation from propofol to versed and fentanyl due to hypertriglyceridemia  Baseline AOx3    Cardiovascular:  #Shock - Septic Shock 2/2 to SBO complicated by abdominal perforation with spillage of gastric contents during surgery  Currently requiring pressor support with levo and vaso  will continue with pressor support.   Will titrate down as tolerated    #HTN   Pt has history of HTN on amlodipine.  Will hold in the setting of shock.     Pulmonary:   #Acute Hypoxic Respiratory Failure 2/2 to fluid overload vs. pneumonia   POCUS exam 6/19- diffuse B-lines, signs of retrocardiac consolidations    pt on meropenem, will cover PNA   Will continue to keep intubated and sedated.   Daily SBT as tolerated    Infectious Diseases:  #Septic Shock 2/2 to SBO complicated by abdominal perforation with spillage of gastric contents during surgery  s/p exploratory laproscopy.   s/p ileostomy   will keep npo   NGT in place, suctioning 550cc in 24 hours    will advance diet as per surgery   Will cover with meropenem, d/c caspofungin       Gastrointestinal:  #SBO  s/p exploratory laproscopy.   s/p ileostomy   will keep npo   NGT in place, suctioning 550cc in 24 hours     will advance diet as per surgery   Will cover with meropenem    Renal:  #PANKAJ VS PANKAJ on CKD  Pt w/ SCr 2.08 on admission  Baseline SCr - Unknown  Scr 3.39  F/U Urine Lytes, calculate FeNa  hold IVF due to fluid overload on pocus exam today   follow BMP daily    #Metabolic Acidosis  ABG 7.27/42/147/19  Metabolic acidosis likely due to septic shock, lactic acidosis   Nephro consulted Dr. Brown  Might need HD   s/p 1 amp of bicarb, bicarb gtt, hyperkalemia cocktail, albuterol     #Hyperkalemia   Potassium 5.7 > 6.2   s/p hyperkalemia cocktail, albuterol overnight  given insulin and dextrose during the say  repeat BMP at 2pm     Heme/onc:  #No Acitve Issues     Endo:   #HLD  Pt has history of HLD  On Rosuvastatin 10 at home   Will continue as Atorvastatin 40 when given clearance by Surgery to advance diet.     Skin/ catheter: -   Fem central Line 6/17  [place] - 6/17-6/18  R Ax lori - 6/18  Caude langley - 6/17    Prophylaxis:   DVT: SCD - Advance to chemical as per surgery.   GI: PPI     Goals of Care: Full Code    Dispo: Will continue to monitor in ICU.      76 y/o male with PMHx of HTN , HLD, Asthma, no prior abdominal surgeries, complains of severe generalized abdominal pain and vomiting since about 8 PM.  Pain is described as sharp , persistent in the epigastric area with multiple > 4NBNB vomiting . Last BM was yesterday morning with persistent flatus. Mild leucocytosis, dehydrated , lactate is 2.1 . CT shows SBO. Pt was taken to the OR for diagnostic laproscopy which was converted to exploratory lap with ileostomy due to spillage. ICU was consulted as pt was requiring pressor support during and post surgery.     #SBO w/ abdominal perforationg, spillage of gastric contents   #AHRF  #Pankaj vs PANKAJ on CKD  #HTN  #HLD  #Asthma      Plan:  Neuro:  Currently intubated and Sedated.   fentanyl 1mg/kg/hr, versed 0.04 mg/kg/hr, will take of versed, start SAT  Baseline AOx3    Cardiovascular:  #Shock - Septic Shock 2/2 to SBO complicated by abdominal perforation with spillage of gastric contents during surgery  Currently requiring pressor support with levo and vaso  will continue with pressor support.   Will titrate down as tolerated  Fluid status- improved urine output, less overloaded  keep bumex gtt today    #AFib  episode of -170s overnight, noted to be Afib  given amiodarone 150mg x1, HR controlled to 100s after that  continue to monitor     #HTN   Pt has history of HTN on amlodipine.  Will hold in the setting of shock.     Pulmonary:   #Acute Hypoxic Respiratory Failure 2/2 to fluid overload vs. pneumonia   POCUS exam 6/19- diffuse B-lines, signs of retrocardiac consolidations    pt on meropenem, will cover PNA   Will continue to keep intubated, starting SAT  Start SBT tomorrow as we awaken him     Infectious Diseases:  #Septic Shock 2/2 to SBO complicated by abdominal perforation with spillage of gastric contents during surgery  s/p exploratory laproscopy.   s/p ileostomy   will keep npo   NGT in place, suctioning 150cc in 24 hours    will advance diet as per surgery   Will cover with meropenem, d/c caspofungin       Gastrointestinal:  #SBO  s/p exploratory laproscopy.   s/p ileostomy   will keep npo   NGT in place, suctioning 250cc in 24 hours     will advance diet as per surgery   Will cover with meropenem    #Transaminitis   slighty uptrend in LFTs since yesterday  likely in the setting of sepsis    Renal:  #PANKAJ VS PANKAJ on CKD  Pt w/ SCr 2.08 on admission  Baseline SCr - Unknown  Scr 3.9 today, uptrending after bumex gtt   Urine lytes- show intrinsic picture, likely ATN   hold IVF due to risk of fluid overload  follow BMP daily    #Metabolic Acidosis  ABG 7.31/38/109/19 this AM   Metabolic acidosis likely due to septic shock, lactic acidosis   Acidosis improving after adjustment of the vent settings yesterday (increased RR)  Went down on RR to 22 on vent  today, will repeat ABG 12pm  Nephro Dr. Brown      #Hyperkalemia   Potassium 5.7 > 6.2 > 5.5 > 5.4   s/p hyperkalemia cocktail x2 overnight  resolving     Heme/onc:  #Thrombocytopenia   - Plts downtrending, 88 today (came in with 200s)  - pt never received heparin products   - possibly in the setting of infection   - f/u platelet count blue top, assess for platelet clumping as well     Endo:   #HLD  Pt has history of HLD  On Rosuvastatin 10 at home   Will continue as Atorvastatin 40 when given clearance by Surgery to advance diet.     Skin/ catheter: -   Fem central Line 6/17 --> change today  [place] - 6/17-6/18  R Ax magui - 6/18  Caude langley - 6/17    Prophylaxis:   DVT: SCD - Advance to chemical as per surgery.   GI: PPI     Goals of Care: Full Code    Dispo: Will continue to monitor in ICU.

## 2023-06-20 NOTE — CHART NOTE - NSCHARTNOTEFT_GEN_A_CORE
R femoral central catheter was removed under aseptic conditions in Trendelenburg position. Dressing was removed, area cleaned with Chlorhexidine. stiches were cut, and area was cleaned again. Appropriate pressure applied afterwards for at least 8 minutes to minimize blood loss. wound was covered with sterile gauze and adhesive tape were applied.

## 2023-06-20 NOTE — PROGRESS NOTE ADULT - SUBJECTIVE AND OBJECTIVE BOX
[facility] NEPHROLOGY- PROGRESS NOTE    Patient is a 77y Male with HTN , HLD, Asthma p/w abd pain a/w SBO s/p OR  dx lap converted to ex lap LOU, SBRx2 with ileostomy creation due to spillage. Pt with Septic shock, hypotension on pressors with GA and hyperkalemia. Nephrology consulted for Elevated serum creatinine.      REVIEW OF SYSTEMS: unable to obtain; pt intubated and sedated.     VITALS:  T(F): 98.1 (23 @ 11:30), Max: 98.6 (23 @ 18:15)  HR: 100 (23 @ 11:30)  BP: 146/66 (23 @ 04:00)  RR: 24 (23 @ 11:30)  SpO2: 95% (23 @ 11:30)  Wt(kg): --     @ 07:01  -   @ 07:00  --------------------------------------------------------  IN: 5121.5 mL / OUT: 3390 mL / NET: 1731.5 mL     @ 07:01  -   @ 11:52  --------------------------------------------------------  IN: 673.1 mL / OUT: 900 mL / NET: -226.9 mL      PHYSICAL EXAM:  Constitutional: intubated, sedated  HEENT: +ETT  Neck: No JVD  Respiratory: mechanical BS B/L  Cardiovascular: tachycardic, S1S2  Gastrointestinal: +ostomy R upper abdomen, midline incision, + increasing distention but still soft.  Extremities: no LE edema b/l  Neurological: sedated  :  +langlye with light yellow urine       LABS:      147<H>  |  111<H>  |  62<H>  ----------------------------<  160<H>  5.4<H>   |  22  |  3.90<H>    Ca    8.1<L>      2023 03:56  Phos  6.6       Mg     2.0         TPro  5.1<L>  /  Alb  1.9<L>  /  TBili  1.0  /  DBili      /  AST  67<H>  /  ALT  62<H>  /  AlkPhos  103      Creatinine Trend: 3.90 <--, 3.88 <--, 3.65 <--, 3.47 <--, 3.39 <--, 3.55 <--, 3.25 <--, 3.16 <--, 2.87 <--, 2.59 <--, 2.60 <--, 2.54 <--, 1.67 <--, 1.69 <--, 2.08 <--                        12.5   17.61 )-----------( 88       ( 2023 03:56 )             38.1     Urine Studies:  Urinalysis Basic - ( 2023 04:52 )    Color: Yellow / Appearance: Clear / S.020 / pH:   Gluc:  / Ketone: Negative  / Bili: Negative / Urobili: Negative   Blood:  / Protein: 15 mg/dL / Nitrite: Negative   Leuk Esterase: Negative / RBC: 2-5 /HPF / WBC 0-2 /HPF   Sq Epi:  / Non Sq Epi:  / Bacteria: Few /HPF      Sodium, Random Urine: 58 mmol/L ( @ 09:50)  Potassium, Random Urine: 54 mmol/L ( @ 09:50)  Creatinine, Random Urine: 76 mg/dL ( @ 09:50)

## 2023-06-20 NOTE — PROGRESS NOTE ADULT - ASSESSMENT
Assessment and Plan: 	  Mr. Curtis is a 76 y/o male POD2 s/p dx lap converted to ex lap LOU, SBRx2 with ileostomy creation.  ostomy pink/moist viable  pna, ventilator support, saturating well.      Plan:  - NGT to suction  - pt on IV abx  -local wound care  - monitor for ostomy output,   - on pressor support, care per icu team

## 2023-06-21 NOTE — PROGRESS NOTE ADULT - ASSESSMENT
Septic Shock  Peritonitis  Leukocytosis - normalized  Resp failure      Plan - Cont Meropenem 500mgs iv q12hrs for now , will adjust dose per cr cl  Cont pressor support  Time spent - 31 mins

## 2023-06-21 NOTE — PROGRESS NOTE ADULT - SUBJECTIVE AND OBJECTIVE BOX
ICU VISIT  77y Male    Meds:  meropenem  IVPB 500 milliGRAM(s) IV Intermittent every 12 hours    Allergies    Bactrim (Hives; Rash)    Intolerances        VITALS:  Vital Signs Last 24 Hrs  T(C): 36.9 (21 Jun 2023 17:45), Max: 37.4 (21 Jun 2023 04:45)  T(F): 98.4 (21 Jun 2023 17:45), Max: 99.3 (21 Jun 2023 04:45)  HR: 71 (21 Jun 2023 17:45) (63 - 142)  BP: 138/58 (21 Jun 2023 16:00) (67/41 - 138/58)  BP(mean): 81 (21 Jun 2023 16:00) (49 - 91)  RR: 18 (21 Jun 2023 17:45) (0 - 26)  SpO2: 97% (21 Jun 2023 17:45) (88% - 100%)    Parameters below as of 21 Jun 2023 16:14  Patient On (Oxygen Delivery Method): ventilator    O2 Concentration (%): 60    LABS/DIAGNOSTIC TESTS:                          10.4   9.93  )-----------( 50       ( 21 Jun 2023 08:25 )             31.8         06-21    144  |  109<H>  |  86<H>  ----------------------------<  207<H>  5.2   |  22  |  4.79<H>    Ca    7.9<L>      21 Jun 2023 08:25  Phos  6.5     06-21  Mg     2.2     06-21    TPro  5.3<L>  /  Alb  2.0<L>  /  TBili  1.2  /  DBili  x   /  AST  84<H>  /  ALT  63<H>  /  AlkPhos  74  06-21      LIVER FUNCTIONS - ( 21 Jun 2023 08:25 )  Alb: 2.0 g/dL / Pro: 5.3 g/dL / ALK PHOS: 74 U/L / ALT: 63 U/L DA / AST: 84 U/L / GGT: x             CULTURES: .Blood Blood-Peripheral  06-18 @ 16:35   No growth to date.  --  --      .Blood Blood-Peripheral  06-18 @ 16:30   No growth to date.  --  --      Clean Catch Clean Catch (Midstream)  06-17 @ 04:52   10,000 - 49,000 CFU/mL Enterococcus faecalis  <10,000 CFU/ml Normal Urogenital aure present  --  Enterococcus faecalis            RADIOLOGY:      ROS:  [  ] UNABLE TO ELICIT ICU VISIT  77y Male who remains in the ICU , he is on 1 pressor only now, he remains intubated on a FIO2 of 60% and PEEP of 8, he has been taken off sedation. He has been taken off his Caspofungin also. He is not febrile and his WBC count remains WNL. He has an increasing cr from ATN but still has urine output. No peritoneal cultures were taken but his blood and urine cultures are negative. His sons are at the bedside.    Meds:  meropenem  IVPB 500 milliGRAM(s) IV Intermittent every 12 hours    Allergies    Bactrim (Hives; Rash)    Intolerances        VITALS:  Vital Signs Last 24 Hrs  T(C): 36.9 (21 Jun 2023 17:45), Max: 37.4 (21 Jun 2023 04:45)  T(F): 98.4 (21 Jun 2023 17:45), Max: 99.3 (21 Jun 2023 04:45)  HR: 71 (21 Jun 2023 17:45) (63 - 142)  BP: 138/58 (21 Jun 2023 16:00) (67/41 - 138/58)  BP(mean): 81 (21 Jun 2023 16:00) (49 - 91)  RR: 18 (21 Jun 2023 17:45) (0 - 26)  SpO2: 97% (21 Jun 2023 17:45) (88% - 100%)    Parameters below as of 21 Jun 2023 16:14  Patient On (Oxygen Delivery Method): ventilator    O2 Concentration (%): 60    LABS/DIAGNOSTIC TESTS:                          10.4   9.93  )-----------( 50       ( 21 Jun 2023 08:25 )             31.8         06-21    144  |  109<H>  |  86<H>  ----------------------------<  207<H>  5.2   |  22  |  4.79<H>    Ca    7.9<L>      21 Jun 2023 08:25  Phos  6.5     06-21  Mg     2.2     06-21    TPro  5.3<L>  /  Alb  2.0<L>  /  TBili  1.2  /  DBili  x   /  AST  84<H>  /  ALT  63<H>  /  AlkPhos  74  06-21      LIVER FUNCTIONS - ( 21 Jun 2023 08:25 )  Alb: 2.0 g/dL / Pro: 5.3 g/dL / ALK PHOS: 74 U/L / ALT: 63 U/L DA / AST: 84 U/L / GGT: x             CULTURES: .Blood Blood-Peripheral  06-18 @ 16:35   No growth to date.  --  --      .Blood Blood-Peripheral  06-18 @ 16:30   No growth to date.  --  --      Clean Catch Clean Catch (Midstream)  06-17 @ 04:52   10,000 - 49,000 CFU/mL Enterococcus faecalis  <10,000 CFU/ml Normal Urogenital aure present  --  Enterococcus faecalis            RADIOLOGY:      ROS:  [ x ] UNABLE TO ELICIT

## 2023-06-21 NOTE — PROGRESS NOTE ADULT - SUBJECTIVE AND OBJECTIVE BOX
INTERVAL HPI/OVERNIGHT EVENTS: ***    PRESSORS: [ ] YES [ ] NO  WHICH:    Antimicrobial:  meropenem  IVPB 500 milliGRAM(s) IV Intermittent every 12 hours    Cardiovascular:  norepinephrine Infusion 0.9 MICROgram(s)/kG/Min IV Continuous <Continuous>    Pulmonary:    Hematalogic:    Other:  albumin human 25% IVPB 50 milliLiter(s) IV Intermittent every 8 hours  chlorhexidine 2% Cloths 1 Application(s) Topical <User Schedule>  fentaNYL   Infusion 0.5 MICROgram(s)/kG/Hr IV Continuous <Continuous>  hydrocortisone sodium succinate Injectable 50 milliGRAM(s) IV Push every 6 hours  midazolam Infusion 0.04 mG/kG/Hr IV Continuous <Continuous>  mupirocin 2% Ointment 1 Application(s) Both Nostrils two times a day  pantoprazole  Injectable 40 milliGRAM(s) IV Push daily  sodium chloride 0.9% lock flush 10 milliLiter(s) IV Push every 1 hour PRN  vasopressin Infusion 0.04 Unit(s)/Min IV Continuous <Continuous>    albumin human 25% IVPB 50 milliLiter(s) IV Intermittent every 8 hours  chlorhexidine 2% Cloths 1 Application(s) Topical <User Schedule>  fentaNYL   Infusion 0.5 MICROgram(s)/kG/Hr IV Continuous <Continuous>  hydrocortisone sodium succinate Injectable 50 milliGRAM(s) IV Push every 6 hours  meropenem  IVPB 500 milliGRAM(s) IV Intermittent every 12 hours  midazolam Infusion 0.04 mG/kG/Hr IV Continuous <Continuous>  mupirocin 2% Ointment 1 Application(s) Both Nostrils two times a day  norepinephrine Infusion 0.9 MICROgram(s)/kG/Min IV Continuous <Continuous>  pantoprazole  Injectable 40 milliGRAM(s) IV Push daily  sodium chloride 0.9% lock flush 10 milliLiter(s) IV Push every 1 hour PRN  vasopressin Infusion 0.04 Unit(s)/Min IV Continuous <Continuous>    Drug Dosing Weight  Height (cm): 170 (17 Jun 2023 01:51)  Weight (kg): 81 (17 Jun 2023 19:13)  BMI (kg/m2): 28 (17 Jun 2023 19:13)  BSA (m2): 1.93 (17 Jun 2023 19:13)    CENTRAL LINE: [ ] YES [ ] NO  LOCATION:   DATE INSERTED:  REMOVE: [ ] YES [ ] NO  EXPLAIN:    MCWILLIAMS: [ ] YES [ ] NO    DATE INSERTED:  REMOVE:  [ ] YES [ ] NO  EXPLAIN:    A-LINE:  [ ] YES [ ] NO  LOCATION:   DATE INSERTED:  REMOVE:  [ ] YES [ ] NO  EXPLAIN:    PMH -reviewed admission note, no change since admission  PAST MEDICAL & SURGICAL HISTORY:  Hypertension      History of asthma      History of high cholesterol      No significant past surgical history          ICU Vital Signs Last 24 Hrs  T(C): 37.1 (21 Jun 2023 08:00), Max: 37.4 (21 Jun 2023 04:45)  T(F): 98.8 (21 Jun 2023 08:00), Max: 99.3 (21 Jun 2023 04:45)  HR: 119 (21 Jun 2023 08:00) (72 - 133)  BP: 136/75 (21 Jun 2023 08:00) (67/41 - 173/56)  BP(mean): 91 (21 Jun 2023 08:00) (49 - 91)  ABP: 127/73 (21 Jun 2023 08:00) (69/37 - 171/90)  ABP(mean): 93 (21 Jun 2023 08:00) (46 - 115)  RR: 19 (21 Jun 2023 08:00) (0 - 30)  SpO2: 95% (21 Jun 2023 08:00) (85% - 100%)    O2 Parameters below as of 21 Jun 2023 08:00  Patient On (Oxygen Delivery Method): ventilator    O2 Concentration (%): 50        ABG - ( 21 Jun 2023 03:24 )  pH, Arterial: 7.34  pH, Blood: x     /  pCO2: 41    /  pO2: 236   / HCO3: 22    / Base Excess: -3.5  /  SaO2: 99                    06-20 @ 07:01  -  06-21 @ 07:00  --------------------------------------------------------  IN: 2807.4 mL / OUT: 3275 mL / NET: -467.6 mL        Mode: AC/ CMV (Assist Control/ Continuous Mandatory Ventilation)  RR (machine): 22  TV (machine): 400  FiO2: 80  PEEP: 10  ITime: 1  MAP: 16  PIP: 32      PHYSICAL EXAM:    GENERAL: NAD, well-groomed, well-developed  HEAD:  Atraumatic, Normocephalic  EYES: EOMI, PERRLA, conjunctiva and sclera clear  ENMT: No tonsillar erythema, exudates, or enlargement; Moist mucous membranes, Good dentition, No lesions  NECK: Supple, normal appearance, No JVD; Normal thyroid; Trachea midline  NERVOUS SYSTEM:  Alert & Oriented X3,  Motor Strength 5/5 B/L upper and lower extremities; DTRs 2+ intact and symmetric  CHEST/LUNG: No chest deformity; Normal percussion bilaterally; No rales, rhonchi, wheezing   HEART: Regular rate and rhythm; No murmurs, rubs, or gallops  ABDOMEN: Soft, Nontender, Nondistended; Bowel sounds present  EXTREMITIES:  2+ Peripheral Pulses, No clubbing, cyanosis, or edema  LYMPH: No lymphadenopathy noted  SKIN: No rashes or lesions;  Good capillary refill      LABS:  CBC Full  -  ( 21 Jun 2023 03:30 )  WBC Count : 11.00 K/uL  RBC Count : 3.58 M/uL  Hemoglobin : 10.6 g/dL  Hematocrit : 32.3 %  Platelet Count - Automated : 56 K/uL  Mean Cell Volume : 90.2 fl  Mean Cell Hemoglobin : 29.6 pg  Mean Cell Hemoglobin Concentration : 32.8 gm/dL  Auto Neutrophil # : 9.24 K/uL  Auto Lymphocyte # : 0.66 K/uL  Auto Monocyte # : 0.77 K/uL  Auto Eosinophil # : 0.00 K/uL  Auto Basophil # : 0.00 K/uL  Auto Neutrophil % : 77.0 %  Auto Lymphocyte % : 6.0 %  Auto Monocyte % : 7.0 %  Auto Eosinophil % : 0.0 %  Auto Basophil % : 0.0 %    06-21    145  |  111<H>  |  79<H>  ----------------------------<  202<H>  5.6<H>   |  23  |  4.70<H>    Ca    7.4<L>      21 Jun 2023 03:30  Phos  6.5     06-21  Mg     2.2     06-21    TPro  5.1<L>  /  Alb  2.0<L>  /  TBili  1.1  /  DBili  x   /  AST  74<H>  /  ALT  60  /  AlkPhos  78  06-21    PT/INR - ( 21 Jun 2023 03:30 )   PT: 15.9 sec;   INR: 1.33 ratio           Urinalysis Basic - ( 21 Jun 2023 03:30 )    Color: x / Appearance: x / SG: x / pH: x  Gluc: 202 mg/dL / Ketone: x  / Bili: x / Urobili: x   Blood: x / Protein: x / Nitrite: x   Leuk Esterase: x / RBC: x / WBC x   Sq Epi: x / Non Sq Epi: x / Bacteria: x      Culture Results:   No growth to date. (06-18 @ 16:35)  Culture Results:   No growth to date. (06-18 @ 16:30)      RADIOLOGY & ADDITIONAL STUDIES REVIEWED:  ***    [ ]GOALS OF CARE DISCUSSION WITH PATIENT/FAMILY/PROXY:    CRITICAL CARE TIME SPENT: 35 minutes INTERVAL HPI/OVERNIGHT EVENTS: Found to be tachycardic to 110-130s this AM, bumex gtt stopped, yesterday evening there was an episode of hypoxia (ett malpositioning), no repeat episodes of hypoxia once the ett was re-positioned. Sedated, but weaning off of sedation today.     PRESSORS: [x ] YES [ ] NO  WHICH: levo, vaso    Antimicrobial:  meropenem  IVPB 500 milliGRAM(s) IV Intermittent every 12 hours    Cardiovascular:  norepinephrine Infusion 0.9 MICROgram(s)/kG/Min IV Continuous <Continuous>    Pulmonary:    Hematalogic:    Other:  albumin human 25% IVPB 50 milliLiter(s) IV Intermittent every 8 hours  chlorhexidine 2% Cloths 1 Application(s) Topical <User Schedule>  fentaNYL   Infusion 0.5 MICROgram(s)/kG/Hr IV Continuous <Continuous>  hydrocortisone sodium succinate Injectable 50 milliGRAM(s) IV Push every 6 hours  midazolam Infusion 0.04 mG/kG/Hr IV Continuous <Continuous>  mupirocin 2% Ointment 1 Application(s) Both Nostrils two times a day  pantoprazole  Injectable 40 milliGRAM(s) IV Push daily  sodium chloride 0.9% lock flush 10 milliLiter(s) IV Push every 1 hour PRN  vasopressin Infusion 0.04 Unit(s)/Min IV Continuous <Continuous>    albumin human 25% IVPB 50 milliLiter(s) IV Intermittent every 8 hours  chlorhexidine 2% Cloths 1 Application(s) Topical <User Schedule>  fentaNYL   Infusion 0.5 MICROgram(s)/kG/Hr IV Continuous <Continuous>  hydrocortisone sodium succinate Injectable 50 milliGRAM(s) IV Push every 6 hours  meropenem  IVPB 500 milliGRAM(s) IV Intermittent every 12 hours  midazolam Infusion 0.04 mG/kG/Hr IV Continuous <Continuous>  mupirocin 2% Ointment 1 Application(s) Both Nostrils two times a day  norepinephrine Infusion 0.9 MICROgram(s)/kG/Min IV Continuous <Continuous>  pantoprazole  Injectable 40 milliGRAM(s) IV Push daily  sodium chloride 0.9% lock flush 10 milliLiter(s) IV Push every 1 hour PRN  vasopressin Infusion 0.04 Unit(s)/Min IV Continuous <Continuous>    Drug Dosing Weight  Height (cm): 170 (17 Jun 2023 01:51)  Weight (kg): 81 (17 Jun 2023 19:13)  BMI (kg/m2): 28 (17 Jun 2023 19:13)  BSA (m2): 1.93 (17 Jun 2023 19:13)    CENTRAL LINE: [x ] YES [ ] NO  LOCATION: [facility]  DATE INSERTED: 6/20  REMOVE: [ ] YES [ ] NO  EXPLAIN:    MCWILLIAMS: [x ] YES [ ] NO    DATE INSERTED:  REMOVE:  [ ] YES [ ] NO  EXPLAIN:    A-LINE:  [x ] YES [ ] NO  LOCATION:   DATE INSERTED: 6/18  REMOVE:  [ ] YES [ ] NO  EXPLAIN:    PMH -reviewed admission note, no change since admission  PAST MEDICAL & SURGICAL HISTORY:  Hypertension      History of asthma      History of high cholesterol      No significant past surgical history          ICU Vital Signs Last 24 Hrs  T(C): 37.1 (21 Jun 2023 08:00), Max: 37.4 (21 Jun 2023 04:45)  T(F): 98.8 (21 Jun 2023 08:00), Max: 99.3 (21 Jun 2023 04:45)  HR: 119 (21 Jun 2023 08:00) (72 - 133)  BP: 136/75 (21 Jun 2023 08:00) (67/41 - 173/56)  BP(mean): 91 (21 Jun 2023 08:00) (49 - 91)  ABP: 127/73 (21 Jun 2023 08:00) (69/37 - 171/90)  ABP(mean): 93 (21 Jun 2023 08:00) (46 - 115)  RR: 19 (21 Jun 2023 08:00) (0 - 30)  SpO2: 95% (21 Jun 2023 08:00) (85% - 100%)    O2 Parameters below as of 21 Jun 2023 08:00  Patient On (Oxygen Delivery Method): ventilator    O2 Concentration (%): 50        ABG - ( 21 Jun 2023 03:24 )  pH, Arterial: 7.34  pH, Blood: x     /  pCO2: 41    /  pO2: 236   / HCO3: 22    / Base Excess: -3.5  /  SaO2: 99                    06-20 @ 07:01  -  06-21 @ 07:00  --------------------------------------------------------  IN: 2807.4 mL / OUT: 3275 mL / NET: -467.6 mL        Mode: AC/ CMV (Assist Control/ Continuous Mandatory Ventilation)  RR (machine): 22  TV (machine): 400  FiO2: 80  PEEP: 10  ITime: 1  MAP: 16  PIP: 32      PHYSICAL EXAM:    GENERAL: NAD, intubated and sedated this AM   HEAD:  Atraumatic, Normocephalic  EYES: EOMI, PERRLA, (+) conjunctival edema , pupils pinpoint 1mm  ENMT: No tonsillar erythema, exudates, or enlargement; Moist mucous membranes, Good dentition, No lesions  NECK: Supple, normal appearance, No JVD; Normal thyroid; Trachea midline  NERVOUS SYSTEM:  Sedated to RASS , no withdrawal to pain, neg gag reflex   CHEST/LUNG: No chest deformity; Normal percussion bilaterally; No rales, rhonchi, wheezing   HEART: Regular rate and rhythm; No murmurs, rubs, or gallops  ABDOMEN: Soft, Nontender, distended; Bowel sounds present, (+) ileostomy in place draining 15cc of fluid, surgical scar w/ clean dressing.    EXTREMITIES:  2+ Peripheral Pulses, No clubbing, cyanosis, or edema  LYMPH: No lymphadenopathy noted  SKIN: No rashes or lesions;  Good capillary refill      LABS:  CBC Full  -  ( 21 Jun 2023 03:30 )  WBC Count : 11.00 K/uL  RBC Count : 3.58 M/uL  Hemoglobin : 10.6 g/dL  Hematocrit : 32.3 %  Platelet Count - Automated : 56 K/uL  Mean Cell Volume : 90.2 fl  Mean Cell Hemoglobin : 29.6 pg  Mean Cell Hemoglobin Concentration : 32.8 gm/dL  Auto Neutrophil # : 9.24 K/uL  Auto Lymphocyte # : 0.66 K/uL  Auto Monocyte # : 0.77 K/uL  Auto Eosinophil # : 0.00 K/uL  Auto Basophil # : 0.00 K/uL  Auto Neutrophil % : 77.0 %  Auto Lymphocyte % : 6.0 %  Auto Monocyte % : 7.0 %  Auto Eosinophil % : 0.0 %  Auto Basophil % : 0.0 %    06-21    145  |  111<H>  |  79<H>  ----------------------------<  202<H>  5.6<H>   |  23  |  4.70<H>    Ca    7.4<L>      21 Jun 2023 03:30  Phos  6.5     06-21  Mg     2.2     06-21    TPro  5.1<L>  /  Alb  2.0<L>  /  TBili  1.1  /  DBili  x   /  AST  74<H>  /  ALT  60  /  AlkPhos  78  06-21    PT/INR - ( 21 Jun 2023 03:30 )   PT: 15.9 sec;   INR: 1.33 ratio           Urinalysis Basic - ( 21 Jun 2023 03:30 )    Color: x / Appearance: x / SG: x / pH: x  Gluc: 202 mg/dL / Ketone: x  / Bili: x / Urobili: x   Blood: x / Protein: x / Nitrite: x   Leuk Esterase: x / RBC: x / WBC x   Sq Epi: x / Non Sq Epi: x / Bacteria: x      Culture Results:   No growth to date. (06-18 @ 16:35)  Culture Results:   No growth to date. (06-18 @ 16:30)      RADIOLOGY & ADDITIONAL STUDIES REVIEWED:  ***    [ ]GOALS OF CARE DISCUSSION WITH PATIENT/FAMILY/PROXY:    CRITICAL CARE TIME SPENT: 35 minutes

## 2023-06-21 NOTE — CHART NOTE - NSCHARTNOTEFT_GEN_A_CORE
Assessment:   Patient is a 77y old  Male who presents with a chief complaint of Intestinal obstruction. Follow-up to nutrition document . Pt  s/p dx lap converted to ex lap LOU, SBRx2 with ileostomy creation POD#4. Pt continues NPO, now Day #5. Discussed on ICU IDR, this AM, pt intubated/ sedated. Noted w/ MSOF, guarded prognosis.         Diet Prescription: Diet, NPO (23 @ 06:25)        Daily     Daily Weight in k.3 (2023 08:00)  Weight in k.7 (2023 07:00)  Weight in k.6 (2023 07:15)    % Weight Change: I>O    Pertinent Medications: MEDICATIONS  (STANDING):  chlorhexidine 2% Cloths 1 Application(s) Topical <User Schedule>  dexMEDEtomidine Infusion 0.2 MICROgram(s)/kG/Hr (4.05 mL/Hr) IV Continuous <Continuous>  glucagon  Injectable 1 milliGRAM(s) IntraMuscular once  hydrocortisone sodium succinate Injectable 50 milliGRAM(s) IV Push every 6 hours  insulin lispro (ADMELOG) corrective regimen sliding scale   SubCutaneous every 6 hours  meropenem  IVPB 500 milliGRAM(s) IV Intermittent every 12 hours  mupirocin 2% Ointment 1 Application(s) Both Nostrils two times a day  norepinephrine Infusion 0.55 MICROgram(s)/kG/Min (41.8 mL/Hr) IV Continuous <Continuous>  pantoprazole  Injectable 40 milliGRAM(s) IV Push daily  vasopressin Infusion 0.04 Unit(s)/Min (6 mL/Hr) IV Continuous <Continuous>    MEDICATIONS  (PRN):  fentaNYL    Injectable 50 MICROGram(s) IV Push every 4 hours PRN agitation  sodium chloride 0.9% lock flush 10 milliLiter(s) IV Push every 1 hour PRN Pre/post blood products, medications, blood draw, and to maintain line patency    Pertinent Labs:  Na144 mmol/L Glu 207 mg/dL<H> K+ 5.2 mmol/L Cr  4.79 mg/dL<H> BUN 86 mg/dL<H>  Phos 6.5 mg/dL<H>  Alb 2.0 g/dL<L>  Chol --    LDL --    HDL --    Trig 851 mg/dL<H>     CAPILLARY BLOOD GLUCOSE      POCT Blood Glucose.: 171 mg/dL (2023 11:32)  POCT Blood Glucose.: 160 mg/dL (2023 06:00)  POCT Blood Glucose.: 123 mg/dL (2023 23:04)  POCT Blood Glucose.: 190 mg/dL (2023 18:50)        Previous Nutrition Diagnosis:   [ ] Altered GI function  [x ]Inadequate Oral Intake [ ] Swallowing Difficulty   [ ] Altered nutrition related labs [ ] Increased Nutrient Needs [ ] Overweight/Obesity   [ ] Unintended Weight Loss [ ] Food & Nutrition Related Knowledge Deficit [ ] Malnutrition   [ ] Other:       New Nutrition Diagnosis: [x ] PCM (moderate) related to acute illness w/ altered GI fx as evidenced by <50% needs met.5 days, 2+ generalized edema      Interventions:   Recommend  [ ] Change Diet To:  [ ] Nutrition Supplement  [ ] Nutrition Support  [x ] Other: Diet advancement per MD. MD to monitor. RD available.     Monitoring and Evaluation:    [ x ] Tolerance to diet prescription [ x ] weights [ x ] labs[ x ] follow up per protocol  [ ] other:

## 2023-06-21 NOTE — PROGRESS NOTE ADULT - SUBJECTIVE AND OBJECTIVE BOX
[facility] NEPHROLOGY- PROGRESS NOTE    Patient is a 77y Male with HTN , HLD, Asthma p/w abd pain a/w SBO s/p OR 6/17 dx lap converted to ex lap LOU, SBRx2 with ileostomy creation due to spillage. Pt with Septic shock, hypotension on pressors with GA and hyperkalemia. Nephrology consulted for Elevated serum creatinine.      REVIEW OF SYSTEMS: unable to obtain; pt intubated and sedated.     VITALS:  T(F): 98.6 (06-21-23 @ 13:45), Max: 99.3 (06-21-23 @ 04:45)  HR: 72 (06-21-23 @ 13:45)  BP: 103/48 (06-21-23 @ 13:30)  RR: 15 (06-21-23 @ 13:45)  SpO2: 97% (06-21-23 @ 13:45)  Wt(kg): --    06-20 @ 07:01  -  06-21 @ 07:00  --------------------------------------------------------  IN: 2807.4 mL / OUT: 3275 mL / NET: -467.6 mL    06-21 @ 07:01  -  06-21 @ 15:28  --------------------------------------------------------  IN: 341.8 mL / OUT: 790 mL / NET: -448.2 mL      PHYSICAL EXAM:  Constitutional: intubated, sedated  HEENT: +ETT  Neck: No JVD  Respiratory: mechanical BS B/L  Cardiovascular: RRR, S1S2  Gastrointestinal: +ostomy R upper abdomen, midline incision, + distended but still soft.  Extremities: no LE edema b/l  Neurological: sedated  :  +langley with light yellow urine       LABS:  06-21    144  |  109<H>  |  86<H>  ----------------------------<  207<H>  5.2   |  22  |  4.79<H>    Ca    7.9<L>      21 Jun 2023 08:25  Phos  6.5     06-21  Mg     2.2     06-21    TPro  5.3<L>  /  Alb  2.0<L>  /  TBili  1.2  /  DBili      /  AST  84<H>  /  ALT  63<H>  /  AlkPhos  74  06-21    Creatinine Trend: 4.79 <--, 4.70 <--, 3.90 <--, 3.88 <--, 3.65 <--, 3.47 <--, 3.39 <--, 3.55 <--, 3.25 <--, 3.16 <--, 2.87 <--, 2.59 <--, 2.60 <--, 2.54 <--, 1.67 <--, 1.69 <--, 2.08 <--                        10.4   9.93  )-----------( 50       ( 21 Jun 2023 08:25 )             31.8     Urine Studies:  Urinalysis Basic - ( 21 Jun 2023 08:25 )    Color:  / Appearance:  / SG:  / pH:   Gluc: 207 mg/dL / Ketone:   / Bili:  / Urobili:    Blood:  / Protein:  / Nitrite:    Leuk Esterase:  / RBC:  / WBC    Sq Epi:  / Non Sq Epi:  / Bacteria:       Sodium, Random Urine: 58 mmol/L (06-19 @ 09:50)  Potassium, Random Urine: 54 mmol/L (06-19 @ 09:50)  Creatinine, Random Urine: 76 mg/dL (06-19 @ 09:50)

## 2023-06-21 NOTE — PROGRESS NOTE ADULT - ASSESSMENT
77y.o. Male s/p dx lap converted to ex lap LOU, SBRx2 with ileostomy creation POD#4    -Wall vacuum replaced  -NGT to LWS  -Irrigate NGT prn  -Daily packing change  -Ostomy care  -Naik for I&O's  -Wean pressor and vent support per ICU team

## 2023-06-21 NOTE — PROGRESS NOTE ADULT - SUBJECTIVE AND OBJECTIVE BOX
INTERVAL HPI/OVERNIGHT EVENTS:  Pt remains intubated. On 2 pressors.  NGT to intermittent suction. Wall vacuum dial nonfunctional.  Ostomy bag with sweat. No air noted.    MEDICATIONS  (STANDING):  albumin human 25% IVPB 50 milliLiter(s) IV Intermittent every 8 hours  chlorhexidine 2% Cloths 1 Application(s) Topical <User Schedule>  fentaNYL   Infusion 0.5 MICROgram(s)/kG/Hr (4.05 mL/Hr) IV Continuous <Continuous>  hydrocortisone sodium succinate Injectable 50 milliGRAM(s) IV Push every 6 hours  meropenem  IVPB 500 milliGRAM(s) IV Intermittent every 12 hours  midazolam Infusion 0.04 mG/kG/Hr (3.24 mL/Hr) IV Continuous <Continuous>  mupirocin 2% Ointment 1 Application(s) Both Nostrils two times a day  norepinephrine Infusion 0.9 MICROgram(s)/kG/Min (68.3 mL/Hr) IV Continuous <Continuous>  pantoprazole  Injectable 40 milliGRAM(s) IV Push daily  vasopressin Infusion 0.04 Unit(s)/Min (6 mL/Hr) IV Continuous <Continuous>    MEDICATIONS  (PRN):  sodium chloride 0.9% lock flush 10 milliLiter(s) IV Push every 1 hour PRN Pre/post blood products, medications, blood draw, and to maintain line patency    Vital Signs Last 24 Hrs  T(C): 37.1 (21 Jun 2023 08:00), Max: 37.4 (21 Jun 2023 04:45)  T(F): 98.8 (21 Jun 2023 08:00), Max: 99.3 (21 Jun 2023 04:45)  HR: 119 (21 Jun 2023 08:00) (72 - 133)  BP: 136/75 (21 Jun 2023 08:00) (67/41 - 173/56)  BP(mean): 91 (21 Jun 2023 08:00) (49 - 91)  RR: 19 (21 Jun 2023 08:00) (0 - 30)  SpO2: 95% (21 Jun 2023 08:00) (85% - 100%)    Parameters below as of 21 Jun 2023 08:00  Patient On (Oxygen Delivery Method): ventilator    O2 Concentration (%): 50    Physical:  General: NAD.  Abdomen: Soft protuberant, no tenderness elicited. Midline wound intermittently approximated with staples. No active bleeding or drainage noted. No surrounding erythema. Ostomy patent, viable.    I&O's Detail    20 Jun 2023 07:01  -  21 Jun 2023 07:00  --------------------------------------------------------  IN:    Bumetanide: 115 mL    FentaNYL: 48.6 mL    FentaNYL: 129.6 mL    IV PiggyBack: 100 mL    IV PiggyBack: 100 mL    Midazolam: 3.2 mL    Midazolam: 9.6 mL    Midazolam: 41.6 mL    Norepinephrine: 627.4 mL    Norepinephrine: 733.5 mL    Norepinephrine: 754.9 mL    Vasopressin: 144 mL  Total IN: 2807.4 mL    OUT:    Ileostomy (mL): 20 mL    Indwelling Catheter - Urethral (mL): 2805 mL    Nasogastric/Oral tube (mL): 450 mL  Total OUT: 3275 mL    Total NET: -467.6 mL    LABS:                        10.6   11.00 )-----------( 56       ( 21 Jun 2023 03:30 )             32.3             06-21    145  |  111<H>  |  79<H>  ----------------------------<  202<H>  5.6<H>   |  23  |  4.70<H>    Ca    7.4<L>      21 Jun 2023 03:30  Phos  6.5     06-21  Mg     2.2     06-21    TPro  5.1<L>  /  Alb  2.0<L>  /  TBili  1.1  /  DBili  x   /  AST  74<H>  /  ALT  60  /  AlkPhos  78  06-21

## 2023-06-21 NOTE — PROGRESS NOTE ADULT - ASSESSMENT
78 y/o male with PMHx of HTN , HLD, Asthma, no prior abdominal surgeries, complains of severe generalized abdominal pain and vomiting since about 8 PM.  Pain is described as sharp , persistent in the epigastric area with multiple > 4NBNB vomiting . Last BM was yesterday morning with persistent flatus. Mild leucocytosis, dehydrated , lactate is 2.1 . CT shows SBO. Pt was taken to the OR for diagnostic laproscopy which was converted to exploratory lap with ileostomy due to spillage. ICU was consulted as pt was requiring pressor support during and post surgery.     #SBO w/ abdominal perforationg, spillage of gastric contents   #AHRF  #Pankaj vs PANKAJ on CKD  #HTN  #HLD  #Asthma      Plan:  Neuro:  Currently intubated and Sedated.   fentanyl 1mg/kg/hr, versed 0.04 mg/kg/hr, will take of versed, start SAT  Baseline AOx3    Cardiovascular:  #Shock - Septic Shock 2/2 to SBO complicated by abdominal perforation with spillage of gastric contents during surgery  Currently requiring pressor support with levo and vaso  will continue with pressor support.   Will titrate down as tolerated  Fluid status- improved urine output, less overloaded  keep bumex gtt today    #AFib  episode of -170s overnight, noted to be Afib  given amiodarone 150mg x1, HR controlled to 100s after that  continue to monitor     #HTN   Pt has history of HTN on amlodipine.  Will hold in the setting of shock.     Pulmonary:   #Acute Hypoxic Respiratory Failure 2/2 to fluid overload vs. pneumonia   POCUS exam 6/19- diffuse B-lines, signs of retrocardiac consolidations    pt on meropenem, will cover PNA   Will continue to keep intubated, starting SAT  Start SBT tomorrow as we awaken him     Infectious Diseases:  #Septic Shock 2/2 to SBO complicated by abdominal perforation with spillage of gastric contents during surgery  s/p exploratory laproscopy.   s/p ileostomy   will keep npo   NGT in place, suctioning 150cc in 24 hours    will advance diet as per surgery   Will cover with meropenem, d/c caspofungin       Gastrointestinal:  #SBO  s/p exploratory laproscopy.   s/p ileostomy   will keep npo   NGT in place, suctioning 250cc in 24 hours     will advance diet as per surgery   Will cover with meropenem    #Transaminitis   slighty uptrend in LFTs since yesterday  likely in the setting of sepsis    Renal:  #PANKAJ VS PANKAJ on CKD  Pt w/ SCr 2.08 on admission  Baseline SCr - Unknown  Scr 3.9 today, uptrending after bumex gtt   Urine lytes- show intrinsic picture, likely ATN   hold IVF due to risk of fluid overload  follow BMP daily    #Metabolic Acidosis  ABG 7.31/38/109/19 this AM   Metabolic acidosis likely due to septic shock, lactic acidosis   Acidosis improving after adjustment of the vent settings yesterday (increased RR)  Went down on RR to 22 on vent  today, will repeat ABG 12pm  Nephro Dr. Brown      #Hyperkalemia   Potassium 5.7 > 6.2 > 5.5 > 5.4   s/p hyperkalemia cocktail x2 overnight  resolving     Heme/onc:  #Thrombocytopenia   - Plts downtrending, 88 today (came in with 200s)  - pt never received heparin products   - possibly in the setting of infection   - f/u platelet count blue top, assess for platelet clumping as well     Endo:   #HLD  Pt has history of HLD  On Rosuvastatin 10 at home   Will continue as Atorvastatin 40 when given clearance by Surgery to advance diet.     Skin/ catheter: -   Fem central Line 6/17 --> change today  [place] - 6/17-6/18  R Ax magui - 6/18  Caude langley - 6/17    Prophylaxis:   DVT: SCD - Advance to chemical as per surgery.   GI: PPI     Goals of Care: Full Code    Dispo: Will continue to monitor in ICU.      78 y/o male with PMHx of HTN , HLD, Asthma, no prior abdominal surgeries, complains of severe generalized abdominal pain and vomiting since about 8 PM.  Pain is described as sharp , persistent in the epigastric area with multiple > 4NBNB vomiting . Last BM was yesterday morning with persistent flatus. Mild leucocytosis, dehydrated , lactate is 2.1 . CT shows SBO. Pt was taken to the OR for diagnostic laproscopy which was converted to exploratory lap with ileostomy due to spillage. ICU was consulted as pt was requiring pressor support during and post surgery.     #SBO w/ abdominal perforationg, spillage of gastric contents   #AHRF  #Pankaj vs PANKAJ on CKD  #HTN  #HLD  #Asthma      Plan:  Neuro:  Currently intubated and Sedated.   fentanyl 1mg/kg/hr, versed 0.04 mg/kg/hr, will take of versed, start SAT  Baseline AOx3    Cardiovascular:  #Shock - Septic Shock 2/2 to SBO complicated by abdominal perforation with spillage of gastric contents during surgery  Currently requiring pressor support with levo and vaso, decreasing requirements   Will titrate down as tolerated  Fluid status- improved urine output, less overloaded  bumex gtt d/c'd today, will use bumex 2mg iv pushes if necessary     #AFib  afib w/ rate to 120-130, will hold off on amiodarone for now   continue to monitor     #HTN   Pt has history of HTN on amlodipine.  Will hold in the setting of shock.     Pulmonary:   #Acute Hypoxic Respiratory Failure 2/2 to fluid overload vs. pneumonia vs. ARDS  POCUS exam 6/19- diffuse B-lines, signs of retrocardiac consolidations    P/F ratio:   pt on meropenem, will cover PNA   SAT/SBT    Infectious Diseases:  #Septic Shock 2/2 to SBO complicated by abdominal perforation with spillage of gastric contents during surgery  s/p exploratory laproscopy.   s/p ileostomy   will keep npo   NGT in place, suctioning 450cc in 24 hours    will advance diet as per surgery   Will cover with meropenem      Gastrointestinal:  #SBO  s/p exploratory laproscopy.   s/p ileostomy   will keep npo   NGT in place, suctioning 450cc in 24 hours     will advance diet as per surgery   Will cover with meropenem    #Transaminitis   slighty uptrend in LFTs  likely in the setting of sepsis    Renal:  #PANKAJ VS PANKAJ on CKD  Pt w/ SCr 2.08 on admission  Baseline SCr - Unknown  Scr 4.7, up-trending   Urine lytes- show intrinsic picture, likely ATN   hold IVF due to risk of fluid overload  follow BMP daily    #Metabolic Acidosis  ABG 7.31/38/109/19 this AM   Metabolic acidosis likely due to septic shock, lactic acidosis   Acidosis improving after adjustment of the vent settings (RR 22)  Went down on RR to 18 on vent  today, will repeat ABG 12pm  Nephro Dr. Brown      #Hyperkalemia   Potassium 5.7 > 6.2 > 5.5 > 5.4   s/p hyperkalemia cocktail x2 overnight  resolving     Heme/onc:  #Thrombocytopenia possibly DIC   - Plts downtrending, ~50 today (came in with 200s)  - pt never received heparin products   - possibly in the setting of infection, ITP, DIC   - fibrinogen 902> 116, >362, hapto 219  - platelet count blue top- 58    Endo:   #HLD  Pt has history of HLD  On Rosuvastatin 10 at home   Will continue as Atorvastatin 40 when given clearance by Surgery to advance diet.     Skin/ catheter: -   Fem central Line d/c'd  [facility] central line 6/20  [place] - 6/17-6/18  R Ax lori - 6/18  Caude langley - 6/17    Prophylaxis:   DVT: SCD - Advance to chemical as per surgery.   GI: PPI     Goals of Care: Full Code    Dispo: Will continue to monitor in ICU.

## 2023-06-21 NOTE — PROGRESS NOTE ADULT - ASSESSMENT
Patient is a 77y Male with HTN , HLD, Asthma p/w abd pain a/w SBO s/p OR 6/17 dx lap converted to ex lap LOU, SBRx2 with ileostomy creation due to spillage. Pt with Septic shock, hypotension on pressors with GA and hyperkalemia. Nephrology consulted for Elevated serum creatinine.    1. GA likely 2/2 ATN in the setting of septic shock. Support with pressors to help renal perfusion to the extent possible. Renal function worsening but good urine o/p (off bumex gtt). No need for HD at this time but discussed with pt's son Jonathan, if worsening renal function and/or decline in urine o/p; will consider HD. Discussed risk/benefits/ alt of HD; consent in charge. Check HepBsAg.   Prior intra-abdominal pressure was not significantly elevated, but if distention worsens, would again check intraabdominal pressure abdominal compression.  Strict I/Os. Avoid nephrotoxins/ NSAIDs/ RCA. Monitor BMP.    2. Volume overload- Pt with good urine o/p. Bumex gtt d/c 6/21 am.   Monitor urine o/p    3. Hyperkalemia- c/w medical management. Last serum K wnl. Monitor serum potassium    4. Acidosis- serum CO2 improving. There is no AG, but lactate remains high.  s/p bicarbonate pushes. Monitor serum Co2/ph    5. Septic Shock- antibiotics and pressors per ICU team. c/w pressors to maintain renal perfusion    6. 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 No specific w/u at this time.      Plan discussed with ICU team    [facility] NEPHROLOGY  Aneudy Brown M.D.  Raheel Darling D.O.  My Rodriguez M.D.  Nia Limon, MSN, ANP-C    Telephone: [phone]  Facsimile: [phone] [place] [address], [address]  [place] [address]

## 2023-06-22 NOTE — PROGRESS NOTE ADULT - GASTROINTESTINAL
soft/no guarding/no rigidity/distended/bowel sounds hypoactive
soft/no guarding/no rigidity/distended/bowel sounds hypoactive

## 2023-06-22 NOTE — PROGRESS NOTE ADULT - SUBJECTIVE AND OBJECTIVE BOX
77y Male    Meds:  meropenem  IVPB 1000 milliGRAM(s) IV Intermittent every 24 hours    Allergies    Bactrim (Hives; Rash)    Intolerances        VITALS:  Vital Signs Last 24 Hrs  T(C): 35.9 (22 Jun 2023 17:15), Max: 36.8 (21 Jun 2023 18:30)  T(F): 96.6 (22 Jun 2023 17:15), Max: 98.2 (21 Jun 2023 18:30)  HR: 61 (22 Jun 2023 17:15) (55 - 71)  BP: 127/52 (22 Jun 2023 17:00) (98/44 - 131/52)  BP(mean): 73 (22 Jun 2023 17:00) (60 - 74)  RR: 17 (22 Jun 2023 17:15) (0 - 23)  SpO2: 96% (22 Jun 2023 17:15) (93% - 100%)    Parameters below as of 22 Jun 2023 14:30  Patient On (Oxygen Delivery Method): ventilator    O2 Concentration (%): 40    LABS/DIAGNOSTIC TESTS:                          10.1   11.69 )-----------( 43       ( 22 Jun 2023 03:50 )             30.5         06-22    142  |  110<H>  |  105<H>  ----------------------------<  159<H>  5.1   |  24  |  5.41<H>    Ca    7.7<L>      22 Jun 2023 03:50  Phos  6.0     06-22  Mg     2.1     06-22    TPro  5.3<L>  /  Alb  1.8<L>  /  TBili  1.0  /  DBili  x   /  AST  89<H>  /  ALT  68<H>  /  AlkPhos  69  06-22      LIVER FUNCTIONS - ( 22 Jun 2023 03:50 )  Alb: 1.8 g/dL / Pro: 5.3 g/dL / ALK PHOS: 69 U/L / ALT: 68 U/L DA / AST: 89 U/L / GGT: x             CULTURES: .Blood Blood-Peripheral  06-18 @ 16:35   No growth to date.  --  --      .Blood Blood-Peripheral  06-18 @ 16:30   No growth to date.  --  --      Clean Catch Clean Catch (Midstream)  06-17 @ 04:52   10,000 - 49,000 CFU/mL Enterococcus faecalis  <10,000 CFU/ml Normal Urogenital aure present  --  Enterococcus faecalis            RADIOLOGY:      ROS:  [  ] UNABLE TO ELICIT ICU VISIT  77y Male who is not doing well, he remains intubated and vent dependent on an FIO2 of 40% and PEEP of 8, he has been off sedation and has not been responsive at all, he has also developed bilat lower leg DVTs and is in     Meds:  meropenem  IVPB 1000 milliGRAM(s) IV Intermittent every 24 hours    Allergies    Bactrim (Hives; Rash)    Intolerances        VITALS:  Vital Signs Last 24 Hrs  T(C): 35.9 (22 Jun 2023 17:15), Max: 36.8 (21 Jun 2023 18:30)  T(F): 96.6 (22 Jun 2023 17:15), Max: 98.2 (21 Jun 2023 18:30)  HR: 61 (22 Jun 2023 17:15) (55 - 71)  BP: 127/52 (22 Jun 2023 17:00) (98/44 - 131/52)  BP(mean): 73 (22 Jun 2023 17:00) (60 - 74)  RR: 17 (22 Jun 2023 17:15) (0 - 23)  SpO2: 96% (22 Jun 2023 17:15) (93% - 100%)    Parameters below as of 22 Jun 2023 14:30  Patient On (Oxygen Delivery Method): ventilator    O2 Concentration (%): 40    LABS/DIAGNOSTIC TESTS:                          10.1   11.69 )-----------( 43       ( 22 Jun 2023 03:50 )             30.5         06-22    142  |  110<H>  |  105<H>  ----------------------------<  159<H>  5.1   |  24  |  5.41<H>    Ca    7.7<L>      22 Jun 2023 03:50  Phos  6.0     06-22  Mg     2.1     06-22    TPro  5.3<L>  /  Alb  1.8<L>  /  TBili  1.0  /  DBili  x   /  AST  89<H>  /  ALT  68<H>  /  AlkPhos  69  06-22      LIVER FUNCTIONS - ( 22 Jun 2023 03:50 )  Alb: 1.8 g/dL / Pro: 5.3 g/dL / ALK PHOS: 69 U/L / ALT: 68 U/L DA / AST: 89 U/L / GGT: x             CULTURES: .Blood Blood-Peripheral  06-18 @ 16:35   No growth to date.  --  --      .Blood Blood-Peripheral  06-18 @ 16:30   No growth to date.  --  --      Clean Catch Clean Catch (Midstream)  06-17 @ 04:52   10,000 - 49,000 CFU/mL Enterococcus faecalis  <10,000 CFU/ml Normal Urogenital aure present  --  Enterococcus faecalis            RADIOLOGY:      ROS:  [  ] UNABLE TO ELICIT ICU VISIT  77y Male who is not doing well, he remains intubated and vent dependent on an FIO2 of 40% and PEEP of 8, he has been off sedation and has not been responsive at all, he has also developed bilat lower leg DVTs and his platelets have been low, the ICU team was worried about DIC but his PT/ INR is not very elevated at all and his Fibrinogen is high not low. He has no fevers and his WBC count is only marginally elevated. His cr is higher and so he had a shiley placed so he can get Dialysis.    Meds:  meropenem  IVPB 1000 milliGRAM(s) IV Intermittent every 24 hours    Allergies    Bactrim (Hives; Rash)    Intolerances        VITALS:  Vital Signs Last 24 Hrs  T(C): 35.9 (22 Jun 2023 17:15), Max: 36.8 (21 Jun 2023 18:30)  T(F): 96.6 (22 Jun 2023 17:15), Max: 98.2 (21 Jun 2023 18:30)  HR: 61 (22 Jun 2023 17:15) (55 - 71)  BP: 127/52 (22 Jun 2023 17:00) (98/44 - 131/52)  BP(mean): 73 (22 Jun 2023 17:00) (60 - 74)  RR: 17 (22 Jun 2023 17:15) (0 - 23)  SpO2: 96% (22 Jun 2023 17:15) (93% - 100%)    Parameters below as of 22 Jun 2023 14:30  Patient On (Oxygen Delivery Method): ventilator    O2 Concentration (%): 40    LABS/DIAGNOSTIC TESTS:                          10.1   11.69 )-----------( 43       ( 22 Jun 2023 03:50 )             30.5         06-22    142  |  110<H>  |  105<H>  ----------------------------<  159<H>  5.1   |  24  |  5.41<H>    Ca    7.7<L>      22 Jun 2023 03:50  Phos  6.0     06-22  Mg     2.1     06-22    TPro  5.3<L>  /  Alb  1.8<L>  /  TBili  1.0  /  DBili  x   /  AST  89<H>  /  ALT  68<H>  /  AlkPhos  69  06-22      LIVER FUNCTIONS - ( 22 Jun 2023 03:50 )  Alb: 1.8 g/dL / Pro: 5.3 g/dL / ALK PHOS: 69 U/L / ALT: 68 U/L DA / AST: 89 U/L / GGT: x             CULTURES: .Blood Blood-Peripheral  06-18 @ 16:35   No growth to date.  --  --      .Blood Blood-Peripheral  06-18 @ 16:30   No growth to date.  --  --      Clean Catch Clean Catch (Midstream)  06-17 @ 04:52   10,000 - 49,000 CFU/mL Enterococcus faecalis  <10,000 CFU/ml Normal Urogenital aure present  --  Enterococcus faecalis            RADIOLOGY:< from: Xray Chest 1 View- PORTABLE-Urgent (Xray Chest 1 View- PORTABLE-Urgent .) (06.22.23 @ 14:54) >  ACC: [id number] EXAM:  XR CHEST PORTABLE URGENT 1V   ORDERED BY: MARYBETH MURDOCK     PROCEDURE DATE:  06/22/2023          INTERPRETATION:  INDICATION: Status post placement of right-sided Shiley    Portable chest 2:02 PM    COMPARISON: 6/21/2023    FINDINGS:  Heart/Vascular: The heart size, mediastinum, hilum and aorta appear   stable.  Pulmonary: Midline trachea. There are small to moderate right larger than   left pleural effusions consolidations. Mild congestion and/or pneumonia.   No pneumothorax.  Bones: There is no fracture.  Lines and catheter: Tip of ET tube mid trachea. NG tube below diaphragm   with tip in stomach. Tip of left IJ catheter in SVC. Tip of new right IJ   Shiley catheter in SVC. Tip of right midline catheter projectsin axilla.    Impression:    Tip of new right IJ Shiley catheter in SVC.  No pneumothorax or other significant changes.    --- End of Report ---             AMANDO MERINO DO; Attending Radiologist  This document has been electronically signed. Jun22 2023  3:34PM    < end of copied text >        ROS:  [ x ] UNABLE TO ELICIT

## 2023-06-22 NOTE — PROGRESS NOTE ADULT - ASSESSMENT
Patient is a 77y Male with HTN , HLD, Asthma p/w abd pain a/w SBO s/p OR 6/17 dx lap converted to ex lap LOU, SBRx2 with ileostomy creation due to spillage. Pt with Septic shock, hypotension on pressors with GA and hyperkalemia. Nephrology consulted for Elevated serum creatinine.    1. GA likely 2/2 ATN in the setting of septic shock. Support with pressors to help renal perfusion to the extent possible. Renal function worsening but good urine o/p (off bumex gtt). Plan for 1st HD today with no UF.   Plan for 2nd HD 6/23. HD consent in chart. Check HepBsAg.   Prior intra-abdominal pressure was not significantly elevated, but if distention worsens, would again check intraabdominal pressure abdominal compression.  Strict I/Os. Avoid nephrotoxins/ NSAIDs/ RCA. Monitor BMP.    2. Volume overload- Pt with good urine o/p. Bumex gtt d/c 6/21.   Monitor urine o/p    3. Hyperkalemia- resolved with medical management. Last serum K wnl. Monitor serum potassium    4. Acidosis- serum CO2 improving. There is no AG, but lactate high.  s/p bicarbonate pushes. Now with respiratory acidosis. Monitor serum Co2/ph    5. Septic Shock- antibiotics and pressors per ICU team. c/w pressors to maintain renal perfusion    6. 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 No specific w/u at this time.      Plan discussed with ICU team    [facility] NEPHROLOGY  Aneudy Brown M.D.  Raheel Darling D.O.  My Rodriguez M.D.  Nia Limon, MSN, ANP-C    Telephone: [phone]  Facsimile: [phone]    [address] [place] [address], [address]  [place] [address]

## 2023-06-22 NOTE — PROGRESS NOTE ADULT - ASSESSMENT
76 y/o male s/p dx lap converted to ex lap LOU, SBRx2 with ileostomy creation POD#5  VSS, afebrile, pressor support x1    Plan  - continue NGT to LWS, irrigate prn  - Daily packing change of midline incision  - Monitor ostomy function, ostomy care prn  - continue IV antibiotics as per ID recommendations   - Wean pressor and vent support per ICU team

## 2023-06-22 NOTE — CHART NOTE - NSCHARTNOTEFT_GEN_A_CORE
Assessment:   Patient is a 77y old  Male who presents with a chief complaint of Intestinal obstruction. Follow-up to document . Pt continues intubated. Continues NPO (TF order of trickle feeds Nepro not infusing, not hanging). Pt currently on HD (first tx) paused for a bedside procedure.        Diet Prescription: Diet, NPO with Tube Feed:   Tube Feeding Modality: Orogastric  Nepro with Carb Steady  Continuous  Starting Tube Feed Rate {mL per Hour}: 10  Until Goal Tube Feed Rate (mL per Hour): 10  Tube Feed Duration (in Hours): 24  Tube Feed Start Time: 09:00 (23 @ 09:06)    Intake: TF order provides: 240 ml Nepro, 432 kcals, 19 gms protein.    Daily     Daily Weight in k.3 (2023 14:30)  Weight in k (2023 08:00)  Weight in k.3 (2023 08:00)  Weight in k.7 (2023 07:00)  Weight in k.6 (2023 07:15)    % Weight Change: 2+ generalized edema, I>O    Pertinent Medications: MEDICATIONS  (STANDING):  albumin human  5% IVPB 250 milliLiter(s) IV Intermittent every 12 hours  chlorhexidine 2% Cloths 1 Application(s) Topical <User Schedule>  glucagon  Injectable 1 milliGRAM(s) IntraMuscular once  insulin lispro (ADMELOG) corrective regimen sliding scale   SubCutaneous every 6 hours  meropenem  IVPB 1000 milliGRAM(s) IV Intermittent every 24 hours  mupirocin 2% Ointment 1 Application(s) Both Nostrils two times a day  norepinephrine Infusion 0.25 MICROgram(s)/kG/Min (19 mL/Hr) IV Continuous <Continuous>  pantoprazole  Injectable 40 milliGRAM(s) IV Push daily    MEDICATIONS  (PRN):  sodium chloride 0.9% lock flush 10 milliLiter(s) IV Push every 1 hour PRN Pre/post blood products, medications, blood draw, and to maintain line patency    Pertinent Labs:  Na142 mmol/L Glu 159 mg/dL<H> K+ 5.1 mmol/L Cr  5.41 mg/dL<H>  mg/dL<H>  Phos 6.0 mg/dL<H>  Alb 1.8 g/dL<L>  Chol --    LDL --    HDL --    Trig 851 mg/dL<H>     CAPILLARY BLOOD GLUCOSE      POCT Blood Glucose.: 121 mg/dL (2023 11:37)  POCT Blood Glucose.: 149 mg/dL (2023 05:04)  POCT Blood Glucose.: 147 mg/dL (2023 23:31)  POCT Blood Glucose.: 142 mg/dL (2023 17:38)        Estimated Needs:   [ ] no change since previous assessment  [ ] recalculated:       Previous Nutrition Diagnosis:   [ ] Altered GI function  [ ]Inadequate Oral Intake [ ] Swallowing Difficulty   [ ] Altered nutrition related labs [ ] Increased Nutrient Needs [ ] Overweight/Obesity   [ ] Unintended Weight Loss [ ] Food & Nutrition Related Knowledge Deficit [x ] Malnutrition (moderate PCM)  [ ] Other:     Nutrition Diagnosis is [x ] ongoing  [ ] resolved [ ] not applicable       Interventions:   Recommend  [ ] Change Diet To:  [ ] Nutrition Supplement  [x ] Nutrition Support: initiate trickle feeds as medically feasible, consider slow increases such as 10 ml/hr each 12-24 hrs to an initial goal Nepro 35x24 840 ml, 1512 kcals, 68 gm protein. Add 1 Pkt Prosource/ day (+15 gm protein, 60 kcals). Note Rec is with HD and  Propofol <5 ml/hr.  MD to monitor. RD available.   [ ] Other:     Monitoring and Evaluation:    [ x ] Tolerance to diet prescription [ x ] weights [ x ] labs[ x ] follow up per protocol  [ ] other:

## 2023-06-22 NOTE — PROGRESS NOTE ADULT - ASSESSMENT
Septic Shock  Peritonitis  Leukocytosis - mildly elevated  Resp failure      Plan - Cont Meropenem 500mgs iv q12hrs for now  Cont pressor support  Time spent - 31 mins

## 2023-06-22 NOTE — PROGRESS NOTE ADULT - SUBJECTIVE AND OBJECTIVE BOX
INTERVAL HPI/OVERNIGHT EVENTS:  Pt resting in bed. Intubated and sedated. No overnight events.   Currently on 1 pressor         MEDICATIONS  (STANDING):  chlorhexidine 2% Cloths 1 Application(s) Topical <User Schedule>  glucagon  Injectable 1 milliGRAM(s) IntraMuscular once  hydrocortisone sodium succinate Injectable 50 milliGRAM(s) IV Push every 6 hours  insulin lispro (ADMELOG) corrective regimen sliding scale   SubCutaneous every 6 hours  lactated ringers. 1000 milliLiter(s) (75 mL/Hr) IV Continuous <Continuous>  meropenem  IVPB 500 milliGRAM(s) IV Intermittent every 12 hours  mupirocin 2% Ointment 1 Application(s) Both Nostrils two times a day  norepinephrine Infusion 0.25 MICROgram(s)/kG/Min (19 mL/Hr) IV Continuous <Continuous>  pantoprazole  Injectable 40 milliGRAM(s) IV Push daily    MEDICATIONS  (PRN):  fentaNYL    Injectable 50 MICROGram(s) IV Push every 4 hours PRN agitation  sodium chloride 0.9% lock flush 10 milliLiter(s) IV Push every 1 hour PRN Pre/post blood products, medications, blood draw, and to maintain line patency      Vital Signs Last 24 Hrs  T(C): 35.8 (22 Jun 2023 07:45), Max: 37.2 (21 Jun 2023 09:45)  T(F): 96.4 (22 Jun 2023 07:45), Max: 99 (21 Jun 2023 09:45)  HR: 59 (22 Jun 2023 07:45) (55 - 142)  BP: 123/50 (22 Jun 2023 04:00) (98/44 - 138/58)  BP(mean): 69 (22 Jun 2023 04:00) (60 - 81)  RR: 20 (22 Jun 2023 07:45) (9 - 24)  SpO2: 99% (22 Jun 2023 07:45) (88% - 100%)    Parameters below as of 21 Jun 2023 18:00  Patient On (Oxygen Delivery Method): ventilator    O2 Concentration (%): 60    Physical:  General: Sedated. NAD. VSS.  Resp: Intubated, on vent.   Head: NGT in place to LCS,  recorded 550cc for shift. Cannister empty at bedside.   Abdomen: Soft nondistended. Midline staples intact. Nonerythematous. Packing changed at bedside. Medilex applied. No active bleeding or drainage noted. Ostomy pink/viable, purple hue noted on portion. +bowel sweat.     I&O's Detail    21 Jun 2023 07:01  -  22 Jun 2023 07:00  --------------------------------------------------------  IN:    FentaNYL: 16.2 mL    IV PiggyBack: 100 mL    Midazolam: 6.4 mL    Norepinephrine: 89.6 mL    Norepinephrine: 221.8 mL    Norepinephrine: 471.2 mL    Vasopressin: 54 mL  Total IN: 959.2 mL    OUT:    Dexmedetomidine: 0 mL    Ileostomy (mL): 30 mL    Indwelling Catheter - Urethral (mL): 1310 mL    Nasogastric/Oral tube (mL): 550 mL  Total OUT: 1890 mL    Total NET: -930.8 mL          LABS:                        10.1   11.69 )-----------( 43       ( 22 Jun 2023 03:50 )             30.5             06-22    142  |  110<H>  |  105<H>  ----------------------------<  159<H>  5.1   |  24  |  5.41<H>    Ca    7.7<L>      22 Jun 2023 03:50  Phos  6.0     06-22  Mg     2.1     06-22    TPro  5.3<L>  /  Alb  1.8<L>  /  TBili  1.0  /  DBili  x   /  AST  89<H>  /  ALT  68<H>  /  AlkPhos  69  06-22

## 2023-06-22 NOTE — PROGRESS NOTE ADULT - ASSESSMENT
76 y/o male with PMHx of HTN , HLD, Asthma, no prior abdominal surgeries, complains of severe generalized abdominal pain and vomiting since about 8 PM.  Pain is described as sharp , persistent in the epigastric area with multiple > 4NBNB vomiting . Last BM was yesterday morning with persistent flatus. Mild leucocytosis, dehydrated , lactate is 2.1 . CT shows SBO. Pt was taken to the OR for diagnostic laproscopy which was converted to exploratory lap with ileostomy due to spillage. ICU was consulted as pt was requiring pressor support during and post surgery.     #SBO w/ abdominal perforationg, spillage of gastric contents   #AHRF  #Pankaj vs PANKAJ on CKD  #HTN  #HLD  #Asthma      Plan:  Neuro:  Currently intubated and Sedated.   fentanyl 1mg/kg/hr, versed 0.04 mg/kg/hr, will take of versed, start SAT  Baseline AOx3    Cardiovascular:  #Shock - Septic Shock 2/2 to SBO complicated by abdominal perforation with spillage of gastric contents during surgery  Currently requiring pressor support with levo and vaso, decreasing requirements   Will titrate down as tolerated  Fluid status- improved urine output, less overloaded  bumex gtt d/c'd today, will use bumex 2mg iv pushes if necessary     #AFib  afib w/ rate to 120-130, will hold off on amiodarone for now   continue to monitor     #HTN   Pt has history of HTN on amlodipine.  Will hold in the setting of shock.     Pulmonary:   #Acute Hypoxic Respiratory Failure 2/2 to fluid overload vs. pneumonia vs. ARDS  POCUS exam 6/19- diffuse B-lines, signs of retrocardiac consolidations    P/F ratio:   pt on meropenem, will cover PNA   SAT/SBT    Infectious Diseases:  #Septic Shock 2/2 to SBO complicated by abdominal perforation with spillage of gastric contents during surgery  s/p exploratory laproscopy.   s/p ileostomy   will keep npo   NGT in place, suctioning 450cc in 24 hours    will advance diet as per surgery   Will cover with meropenem      Gastrointestinal:  #SBO  s/p exploratory laproscopy.   s/p ileostomy   will keep npo   NGT in place, suctioning 450cc in 24 hours     will advance diet as per surgery   Will cover with meropenem    #Transaminitis   slighty uptrend in LFTs  likely in the setting of sepsis    Renal:  #PANKAJ VS PANKAJ on CKD  Pt w/ SCr 2.08 on admission  Baseline SCr - Unknown  Scr 4.7, up-trending   Urine lytes- show intrinsic picture, likely ATN   hold IVF due to risk of fluid overload  follow BMP daily    #Metabolic Acidosis  ABG 7.31/38/109/19 this AM   Metabolic acidosis likely due to septic shock, lactic acidosis   Acidosis improving after adjustment of the vent settings (RR 22)  Went down on RR to 18 on vent  today, will repeat ABG 12pm  Nephro Dr. Brown      #Hyperkalemia   Potassium 5.7 > 6.2 > 5.5 > 5.4   s/p hyperkalemia cocktail x2 overnight  resolving     Heme/onc:  #Thrombocytopenia possibly DIC   - Plts downtrending, ~50 today (came in with 200s)  - pt never received heparin products   - possibly in the setting of infection, ITP, DIC   - fibrinogen 902> 116, >362, hapto 219  - platelet count blue top- 58    Endo:   #HLD  Pt has history of HLD  On Rosuvastatin 10 at home   Will continue as Atorvastatin 40 when given clearance by Surgery to advance diet.     Skin/ catheter: -   Fem central Line d/c'd  LIJ central line 6/20  Newcomb - 6/17-6/18  R Ax lori - 6/18  Caude langley - 6/17    Prophylaxis:   DVT: SCD - Advance to chemical as per surgery.   GI: PPI     Goals of Care: Full Code    Dispo: Will continue to monitor in ICU.      76 y/o male with PMHx of HTN , HLD, Asthma, no prior abdominal surgeries, complains of severe generalized abdominal pain and vomiting since about 8 PM.  Pain is described as sharp , persistent in the epigastric area with multiple > 4NBNB vomiting . Last BM was yesterday morning with persistent flatus. Mild leucocytosis, dehydrated , lactate is 2.1 . CT shows SBO. Pt was taken to the OR for diagnostic laproscopy which was converted to exploratory lap with ileostomy due to spillage. ICU was consulted as pt was requiring pressor support during and post surgery.     #SBO w/ abdominal perforation, spillage of gastric contents   #AHRF  #Pankaj vs PANKAJ on CKD  #HTN  #HLD  #Asthma      Plan:  Neuro:  Currently intubated and Sedated.   fentanyl 1mg/kg/hr, versed 0.04 mg/kg/hr, will take of versed, start SAT  Baseline AOx3    Cardiovascular:  #Shock - Septic Shock 2/2 to SBO complicated by abdominal perforation with spillage of gastric contents during surgery  Currently requiring pressor support with levo and vaso, decreasing requirements   Will titrate down as tolerated  Fluid status- improved urine output, however still signs of edema on physical exam   given bumex 2mg ivp x1   albumin 250ml q8 x2 (as per surgery request)     #AFib  afib w/ rate to 120-130, will hold off on amiodarone for now   continue to monitor     #HTN   Pt has history of HTN on amlodipine.  Will hold in the setting of shock.     Pulmonary:   #Acute Hypoxic Respiratory Failure 2/2 to fluid overload vs. pneumonia vs. ARDS  POCUS exam 6/19- diffuse B-lines, signs of retrocardiac consolidations    P/F ratio: <300, mild-mod hypoxemia, indicative of ARDS   pt. on FiO2 40%, PEEP 8 today, will keep vented   pt on meropenem, will cover PNA   SAT/SBT    Infectious Diseases:  #Septic Shock 2/2 to SBO complicated by abdominal perforation with spillage of gastric contents during surgery  s/p exploratory laproscopy  s/p ileostomy   will keep npo   NGT in place, suctioning 200cc in overnight     will advance diet as per surgery - start trickle feeds   Will cover with meropenem      Gastrointestinal:  #SBO  s/p exploratory laproscopy.   s/p ileostomy   will keep npo   NGT in place, suctioning 200cc in overnight     will advance diet as per surgery - start trickle feeds   Will cover with meropenem    #Transaminitis   slighty uptrend in LFTs  likely in the setting of sepsis    Renal:  #ATN due to sepsis   Pt w/ SCr 2.08 on admission  Baseline SCr - Unknown  Scr 5.4, up-trending   Urine lytes- show intrinsic picture, likely ATN   *will undergo HD today, RIJ to be placed  hold IVF due to risk of fluid overload  follow BMP daily    #Metabolic Acidosis  ABG 7.31/49/165/25 this AM   Metabolic acidosis likely due to septic shock, lactic acidosis   Acidosis improving after adjustment of the vent settings  Nephro Dr. Brown      #Hyperkalemia   Potassium 5.7 > 6.2 > 5.5 > 5.4   s/p hyperkalemia cocktail x2 overnight  resolving     Heme/onc:  #Thrombocytopenia possibly DIC   - Plts downtrending, ~48 today (came in with 200s)  - pt never received heparin products   - possibly in the setting of infection, ITP, DIC   - fibrinogen 902> 116 (lab error) > 810, >362> 353, hapto 219  - platelet count blue top- 58    Endo:   #HLD  Pt has history of HLD  On Rosuvastatin 10 at home   Will continue as Atorvastatin 40 when given clearance by Surgery to advance diet.     Skin/ catheter: -   Fem central Line d/c'd  LIJ central line 6/20  Magui - 6/17-6/18  R Ax magui - 6/18  Caude langley - 6/17    Prophylaxis:   DVT: SCD - Advance to chemical as per surgery.   GI: PPI     Goals of Care: Full Code    Dispo: Will continue to monitor in ICU.

## 2023-06-22 NOTE — PROGRESS NOTE ADULT - RESPIRATORY
clear to auscultation bilaterally/no wheezes/no rales/no rhonchi/airway patent/breath sounds equal/good air movement
no wheezes/no rales/airway patent/breath sounds equal/good air movement/rhonchi

## 2023-06-22 NOTE — PROGRESS NOTE ADULT - SUBJECTIVE AND OBJECTIVE BOX
[facility] NEPHROLOGY- PROGRESS NOTE    Patient is a 77y Male with HTN , HLD, Asthma p/w abd pain a/w SBO s/p OR 6/17 dx lap converted to ex lap LOU, SBRx2 with ileostomy creation due to spillage. Pt with Septic shock, hypotension on pressors with GA and hyperkalemia. Nephrology consulted for Elevated serum creatinine.      REVIEW OF SYSTEMS: unable to obtain; pt intubated and sedated.     VITALS:  T(F): 96.3 (06-22-23 @ 13:00), Max: 98.6 (06-21-23 @ 13:45)  HR: 59 (06-22-23 @ 13:00)  BP: 119/45 (06-22-23 @ 12:00)  RR: 22 (06-22-23 @ 13:00)  SpO2: 95% (06-22-23 @ 13:00)  Wt(kg): --    06-21 @ 07:01  -  06-22 @ 07:00  --------------------------------------------------------  IN: 959.2 mL / OUT: 1890 mL / NET: -930.8 mL    06-22 @ 07:01  -  06-22 @ 13:31  --------------------------------------------------------  IN: 589 mL / OUT: 60 mL / NET: 529 mL      PHYSICAL EXAM:  Constitutional: intubated, sedated  HEENT: +ETT  Neck: No JVD  Respiratory: mechanical BS B/L  Cardiovascular: RRR, S1S2  Gastrointestinal: +ostomy R upper abdomen, midline incision, + distended but still soft.  Extremities: no LE edema b/l  Neurological: sedated  :  +langley     LABS:  06-22    142  |  110<H>  |  105<H>  ----------------------------<  159<H>  5.1   |  24  |  5.41<H>    Ca    7.7<L>      22 Jun 2023 03:50  Phos  6.0     06-22  Mg     2.1     06-22    TPro  5.3<L>  /  Alb  1.8<L>  /  TBili  1.0  /  DBili      /  AST  89<H>  /  ALT  68<H>  /  AlkPhos  69  06-22    Creatinine Trend: 5.41 <--, 4.79 <--, 4.70 <--, 3.90 <--, 3.88 <--, 3.65 <--, 3.47 <--, 3.39 <--, 3.55 <--, 3.25 <--, 3.16 <--, 2.87 <--, 2.59 <--, 2.60 <--, 2.54 <--, 1.67 <--, 1.69 <--, 2.08 <--                        10.1   11.69 )-----------( 43       ( 22 Jun 2023 03:50 )             30.5     Urine Studies:  Urinalysis Basic - ( 22 Jun 2023 03:50 )    Color:  / Appearance:  / SG:  / pH:   Gluc: 159 mg/dL / Ketone:   / Bili:  / Urobili:    Blood:  / Protein:  / Nitrite:    Leuk Esterase:  / RBC:  / WBC    Sq Epi:  / Non Sq Epi:  / Bacteria:       Sodium, Random Urine: 58 mmol/L (06-19 @ 09:50)  Potassium, Random Urine: 54 mmol/L (06-19 @ 09:50)  Creatinine, Random Urine: 76 mg/dL (06-19 @ 09:50)

## 2023-06-22 NOTE — PROCEDURE NOTE - NSPOSTPRCRAD_GEN_A_CORE
Clear bilaterally, pupils equal, round and reactive to light. central line located in the superior vena cava/no pneumothorax

## 2023-06-22 NOTE — PROGRESS NOTE ADULT - SUBJECTIVE AND OBJECTIVE BOX
INTERVAL HPI/OVERNIGHT EVENTS: ***    PRESSORS: [ ] YES [ ] NO  WHICH:    Antimicrobial:  meropenem  IVPB 1000 milliGRAM(s) IV Intermittent every 24 hours    Cardiovascular:  norepinephrine Infusion 0.25 MICROgram(s)/kG/Min IV Continuous <Continuous>    Pulmonary:    Hematalogic:    Other:  albumin human  5% IVPB 250 milliLiter(s) IV Intermittent every 12 hours  chlorhexidine 2% Cloths 1 Application(s) Topical <User Schedule>  glucagon  Injectable 1 milliGRAM(s) IntraMuscular once  insulin lispro (ADMELOG) corrective regimen sliding scale   SubCutaneous every 6 hours  mupirocin 2% Ointment 1 Application(s) Both Nostrils two times a day  pantoprazole  Injectable 40 milliGRAM(s) IV Push daily  sodium chloride 0.9% lock flush 10 milliLiter(s) IV Push every 1 hour PRN    albumin human  5% IVPB 250 milliLiter(s) IV Intermittent every 12 hours  chlorhexidine 2% Cloths 1 Application(s) Topical <User Schedule>  glucagon  Injectable 1 milliGRAM(s) IntraMuscular once  insulin lispro (ADMELOG) corrective regimen sliding scale   SubCutaneous every 6 hours  meropenem  IVPB 1000 milliGRAM(s) IV Intermittent every 24 hours  mupirocin 2% Ointment 1 Application(s) Both Nostrils two times a day  norepinephrine Infusion 0.25 MICROgram(s)/kG/Min IV Continuous <Continuous>  pantoprazole  Injectable 40 milliGRAM(s) IV Push daily  sodium chloride 0.9% lock flush 10 milliLiter(s) IV Push every 1 hour PRN    Drug Dosing Weight  Height (cm): 170 (17 Jun 2023 01:51)  Weight (kg): 81 (17 Jun 2023 19:13)  BMI (kg/m2): 28 (17 Jun 2023 19:13)  BSA (m2): 1.93 (17 Jun 2023 19:13)    CENTRAL LINE: [ ] YES [ ] NO  LOCATION:   DATE INSERTED:  REMOVE: [ ] YES [ ] NO  EXPLAIN:    MCWILLIAMS: [ ] YES [ ] NO    DATE INSERTED:  REMOVE:  [ ] YES [ ] NO  EXPLAIN:    A-LINE:  [ ] YES [ ] NO  LOCATION:   DATE INSERTED:  REMOVE:  [ ] YES [ ] NO  EXPLAIN:    PMH -reviewed admission note, no change since admission  PAST MEDICAL & SURGICAL HISTORY:  Hypertension      History of asthma      History of high cholesterol      No significant past surgical history          ICU Vital Signs Last 24 Hrs  T(C): 35.8 (22 Jun 2023 07:45), Max: 37.2 (21 Jun 2023 12:15)  T(F): 96.4 (22 Jun 2023 07:45), Max: 99 (21 Jun 2023 12:15)  HR: 59 (22 Jun 2023 07:45) (55 - 142)  BP: 123/50 (22 Jun 2023 04:00) (98/44 - 138/58)  BP(mean): 69 (22 Jun 2023 04:00) (60 - 81)  ABP: 114/58 (22 Jun 2023 07:45) (93/47 - 142/63)  ABP(mean): 77 (22 Jun 2023 07:45) (61 - 88)  RR: 20 (22 Jun 2023 07:45) (9 - 24)  SpO2: 99% (22 Jun 2023 07:45) (88% - 100%)    O2 Parameters below as of 21 Jun 2023 18:00  Patient On (Oxygen Delivery Method): ventilator    O2 Concentration (%): 60        ABG - ( 22 Jun 2023 03:18 )  pH, Arterial: 7.31  pH, Blood: x     /  pCO2: 49    /  pO2: 165   / HCO3: 25    / Base Excess: -2.1  /  SaO2: 100                   06-21 @ 07:01  -  06-22 @ 07:00  --------------------------------------------------------  IN: 959.2 mL / OUT: 1890 mL / NET: -930.8 mL        Mode: AC/ CMV (Assist Control/ Continuous Mandatory Ventilation)  RR (machine): 22  TV (machine): 400  FiO2: 60  PEEP: 8  ITime: 1  MAP: 14  PIP: 31      PHYSICAL EXAM:    GENERAL: NAD, well-groomed, well-developed  HEAD:  Atraumatic, Normocephalic  EYES: EOMI, PERRLA, conjunctiva and sclera clear  ENMT: No tonsillar erythema, exudates, or enlargement; Moist mucous membranes, Good dentition, No lesions  NECK: Supple, normal appearance, No JVD; Normal thyroid; Trachea midline  NERVOUS SYSTEM:  Alert & Oriented X3,  Motor Strength 5/5 B/L upper and lower extremities; DTRs 2+ intact and symmetric  CHEST/LUNG: No chest deformity; Normal percussion bilaterally; No rales, rhonchi, wheezing   HEART: Regular rate and rhythm; No murmurs, rubs, or gallops  ABDOMEN: Soft, Nontender, Nondistended; Bowel sounds present  EXTREMITIES:  2+ Peripheral Pulses, No clubbing, cyanosis, or edema  LYMPH: No lymphadenopathy noted  SKIN: No rashes or lesions;  Good capillary refill      LABS:  CBC Full  -  ( 22 Jun 2023 03:50 )  WBC Count : 11.69 K/uL  RBC Count : 3.47 M/uL  Hemoglobin : 10.1 g/dL  Hematocrit : 30.5 %  Platelet Count - Automated : 43 K/uL  Mean Cell Volume : 87.9 fl  Mean Cell Hemoglobin : 29.1 pg  Mean Cell Hemoglobin Concentration : 33.1 gm/dL  Auto Neutrophil # : 11.22 K/uL  Auto Lymphocyte # : 0.00 K/uL  Auto Monocyte # : 0.23 K/uL  Auto Eosinophil # : 0.12 K/uL  Auto Basophil # : 0.00 K/uL  Auto Neutrophil % : 94.0 %  Auto Lymphocyte % : 0.0 %  Auto Monocyte % : 2.0 %  Auto Eosinophil % : 1.0 %  Auto Basophil % : 0.0 %    06-22    142  |  110<H>  |  105<H>  ----------------------------<  159<H>  5.1   |  24  |  5.41<H>    Ca    7.7<L>      22 Jun 2023 03:50  Phos  6.0     06-22  Mg     2.1     06-22    TPro  5.3<L>  /  Alb  1.8<L>  /  TBili  1.0  /  DBili  x   /  AST  89<H>  /  ALT  68<H>  /  AlkPhos  69  06-22    PT/INR - ( 22 Jun 2023 03:50 )   PT: 13.6 sec;   INR: 1.14 ratio           Urinalysis Basic - ( 22 Jun 2023 03:50 )    Color: x / Appearance: x / SG: x / pH: x  Gluc: 159 mg/dL / Ketone: x  / Bili: x / Urobili: x   Blood: x / Protein: x / Nitrite: x   Leuk Esterase: x / RBC: x / WBC x   Sq Epi: x / Non Sq Epi: x / Bacteria: x          RADIOLOGY & ADDITIONAL STUDIES REVIEWED:  ***    [ ]GOALS OF CARE DISCUSSION WITH PATIENT/FAMILY/PROXY:    CRITICAL CARE TIME SPENT: 35 minutes INTERVAL HPI/OVERNIGHT EVENTS: No acute events overnight. pt was down-titrated on levo (.25 today). off sedation, no meaningful movements today, no withdrawal to pain. going for HD today, will get RIJ shiley access today.     PRESSORS: [x ] YES [ ] NO  WHICH: levo    Antimicrobial:  meropenem  IVPB 1000 milliGRAM(s) IV Intermittent every 24 hours    Cardiovascular:  norepinephrine Infusion 0.25 MICROgram(s)/kG/Min IV Continuous <Continuous>    Pulmonary:    Hematalogic:    Other:  albumin human  5% IVPB 250 milliLiter(s) IV Intermittent every 12 hours  chlorhexidine 2% Cloths 1 Application(s) Topical <User Schedule>  glucagon  Injectable 1 milliGRAM(s) IntraMuscular once  insulin lispro (ADMELOG) corrective regimen sliding scale   SubCutaneous every 6 hours  mupirocin 2% Ointment 1 Application(s) Both Nostrils two times a day  pantoprazole  Injectable 40 milliGRAM(s) IV Push daily  sodium chloride 0.9% lock flush 10 milliLiter(s) IV Push every 1 hour PRN    albumin human  5% IVPB 250 milliLiter(s) IV Intermittent every 12 hours  chlorhexidine 2% Cloths 1 Application(s) Topical <User Schedule>  glucagon  Injectable 1 milliGRAM(s) IntraMuscular once  insulin lispro (ADMELOG) corrective regimen sliding scale   SubCutaneous every 6 hours  meropenem  IVPB 1000 milliGRAM(s) IV Intermittent every 24 hours  mupirocin 2% Ointment 1 Application(s) Both Nostrils two times a day  norepinephrine Infusion 0.25 MICROgram(s)/kG/Min IV Continuous <Continuous>  pantoprazole  Injectable 40 milliGRAM(s) IV Push daily  sodium chloride 0.9% lock flush 10 milliLiter(s) IV Push every 1 hour PRN    Drug Dosing Weight  Height (cm): 170 (17 Jun 2023 01:51)  Weight (kg): 81 (17 Jun 2023 19:13)  BMI (kg/m2): 28 (17 Jun 2023 19:13)  BSA (m2): 1.93 (17 Jun 2023 19:13)    CENTRAL LINE: [ x] YES [ ] NO  LOCATION: [facility]  DATE INSERTED: 6/19  REMOVE: [ ] YES [ ] NO  EXPLAIN:    MCWILLIAMS: [X ] YES [ ] NO    DATE INSERTED:  REMOVE:  [ ] YES [ ] NO  EXPLAIN:    A-LINE:  [X ] YES [ ] NO  LOCATION:   DATE INSERTED: 6/18  REMOVE:  [ ] YES [ ] NO  EXPLAIN:    PMH -reviewed admission note, no change since admission  PAST MEDICAL & SURGICAL HISTORY:  Hypertension      History of asthma      History of high cholesterol      No significant past surgical history          ICU Vital Signs Last 24 Hrs  T(C): 35.8 (22 Jun 2023 07:45), Max: 37.2 (21 Jun 2023 12:15)  T(F): 96.4 (22 Jun 2023 07:45), Max: 99 (21 Jun 2023 12:15)  HR: 59 (22 Jun 2023 07:45) (55 - 142)  BP: 123/50 (22 Jun 2023 04:00) (98/44 - 138/58)  BP(mean): 69 (22 Jun 2023 04:00) (60 - 81)  ABP: 114/58 (22 Jun 2023 07:45) (93/47 - 142/63)  ABP(mean): 77 (22 Jun 2023 07:45) (61 - 88)  RR: 20 (22 Jun 2023 07:45) (9 - 24)  SpO2: 99% (22 Jun 2023 07:45) (88% - 100%)    O2 Parameters below as of 21 Jun 2023 18:00  Patient On (Oxygen Delivery Method): ventilator    O2 Concentration (%): 60        ABG - ( 22 Jun 2023 03:18 )  pH, Arterial: 7.31  pH, Blood: x     /  pCO2: 49    /  pO2: 165   / HCO3: 25    / Base Excess: -2.1  /  SaO2: 100                   06-21 @ 07:01  -  06-22 @ 07:00  --------------------------------------------------------  IN: 959.2 mL / OUT: 1890 mL / NET: -930.8 mL        Mode: AC/ CMV (Assist Control/ Continuous Mandatory Ventilation)  RR (machine): 22  TV (machine): 400  FiO2: 60  PEEP: 8  ITime: 1  MAP: 14  PIP: 31      PHYSICAL EXAM:    GENERAL: NAD, intubated and sedated this AM   HEAD:  Atraumatic, Normocephalic  EYES: EOMI, PERRLA, (+) conjunctival edema , pupils pinpoint 1mm  ENMT: No tonsillar erythema, exudates, or enlargement; Moist mucous membranes, Good dentition, No lesions  NECK: Supple, normal appearance, No JVD; Normal thyroid; Trachea midline  NERVOUS SYSTEM:  No longer on sedation, no withdrawal to pain, neg gag reflex   CHEST/LUNG: No chest deformity; Normal percussion bilaterally; No rales, rhonchi, wheezing   HEART: Regular rate and rhythm; No murmurs, rubs, or gallops  ABDOMEN: Soft, Nontender, distended; Bowel sounds present, (+) ileostomy in place draining 15cc of fluid, surgical scar w/ clean dressing.    EXTREMITIES:  2+ Peripheral Pulses, No clubbing, cyanosis, (+) upper extremity edema, scrotal edema   LYMPH: No lymphadenopathy noted  SKIN: No rashes or lesions;  Good capillary refill        LABS:  CBC Full  -  ( 22 Jun 2023 03:50 )  WBC Count : 11.69 K/uL  RBC Count : 3.47 M/uL  Hemoglobin : 10.1 g/dL  Hematocrit : 30.5 %  Platelet Count - Automated : 43 K/uL  Mean Cell Volume : 87.9 fl  Mean Cell Hemoglobin : 29.1 pg  Mean Cell Hemoglobin Concentration : 33.1 gm/dL  Auto Neutrophil # : 11.22 K/uL  Auto Lymphocyte # : 0.00 K/uL  Auto Monocyte # : 0.23 K/uL  Auto Eosinophil # : 0.12 K/uL  Auto Basophil # : 0.00 K/uL  Auto Neutrophil % : 94.0 %  Auto Lymphocyte % : 0.0 %  Auto Monocyte % : 2.0 %  Auto Eosinophil % : 1.0 %  Auto Basophil % : 0.0 %    06-22    142  |  110<H>  |  105<H>  ----------------------------<  159<H>  5.1   |  24  |  5.41<H>    Ca    7.7<L>      22 Jun 2023 03:50  Phos  6.0     06-22  Mg     2.1     06-22    TPro  5.3<L>  /  Alb  1.8<L>  /  TBili  1.0  /  DBili  x   /  AST  89<H>  /  ALT  68<H>  /  AlkPhos  69  06-22    PT/INR - ( 22 Jun 2023 03:50 )   PT: 13.6 sec;   INR: 1.14 ratio           Urinalysis Basic - ( 22 Jun 2023 03:50 )    Color: x / Appearance: x / SG: x / pH: x  Gluc: 159 mg/dL / Ketone: x  / Bili: x / Urobili: x   Blood: x / Protein: x / Nitrite: x   Leuk Esterase: x / RBC: x / WBC x   Sq Epi: x / Non Sq Epi: x / Bacteria: x          RADIOLOGY & ADDITIONAL STUDIES REVIEWED:  ***    [ ]GOALS OF CARE DISCUSSION WITH PATIENT/FAMILY/PROXY:    CRITICAL CARE TIME SPENT: 35 minutes

## 2023-06-23 NOTE — CHART NOTE - NSCHARTNOTEFT_GEN_A_CORE
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 CXR ordered for confirmation.

## 2023-06-23 NOTE — PROGRESS NOTE ADULT - ASSESSMENT
Patient is a 77y Male with HTN , HLD, Asthma p/w abd pain a/w SBO s/p OR 6/17 dx lap converted to ex lap LOU, SBRx2 with ileostomy creation due to spillage. Pt with Septic shock, hypotension on pressors with GA and hyperkalemia. Nephrology consulted for Elevated serum creatinine.    1. GA likely 2/2 ATN in the setting of septic shock. c/w with pressors to help renal perfusion. Pt with oliguric GA with worsening renal function/ fluid overload, for which pt was initiated on HD 6/22. Pt seen on HD this am, hemodynamically stable, tolerating UF goal of 1L. Plan for 3rd HD tx on 6/24.   Prior intra-abdominal pressure was not significantly elevated, but if distention worsens, would again check intraabdominal pressure abdominal compression.  Strict I/Os. Avoid nephrotoxins/ NSAIDs/ RCA. Monitor BMP.    2. Volume overload- increase UF as tolerated with HD.  Monitor urine o/p    3. Hyperkalemia- resolved with medical management. Last serum K wnl. Monitor serum potassium    4. Acidosis- serum CO2 improving. Metabolic acidosis resolved; repeat serum lactate.  s/p bicarbonate pushes. Now with respiratory acidosis. Vent support as per ICU. Monitor serum Co2/ph    5. Septic Shock- antibiotics and pressors per ICU team. c/w pressors to maintain renal perfusion    6. 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 No specific w/u at this time.      Plan discussed with ICU team and pt's daughter at bedside.     [facility] NEPHROLOGY  Aneudy Brown M.D.  Raheel Darling D.O.  My Rodriguez M.D.  Nia Limon, PRECIOUS, ANP-C    Telephone: [phone]  Facsimile: [phone]    [facility] [place] [address], [address]  [place] [address]

## 2023-06-23 NOTE — PROGRESS NOTE ADULT - ASSESSMENT
Assessment:   76 y/o male s/p dx lap converted to ex lap LOU, SBRx2 with ileostomy creation POD#6  VSS, afebrile, pressor support x1  Febrile to 100.2, otherwise VSS.    Plan  - continue NGT to LWS, irrigate prn  - Continue IV ABX, IVF  - Daily packing change of midline incision  - Monitor ostomy function, ostomy care prn  - continue IV antibiotics as per ID recommendations   - Wean pressor and vent support per ICU team  - Remainder of care per ICU/Primary team

## 2023-06-23 NOTE — PROGRESS NOTE ADULT - SUBJECTIVE AND OBJECTIVE BOX
[facility] NEPHROLOGY- PROGRESS NOTE    Patient is a 77y Male with HTN , HLD, Asthma p/w abd pain a/w SBO s/p OR 6/17 dx lap converted to ex lap LOU, SBRx2 with ileostomy creation due to spillage. Pt with Septic shock, hypotension on pressors with GA and hyperkalemia. Nephrology consulted for Elevated serum creatinine.      REVIEW OF SYSTEMS: unable to obtain; pt intubated     VITALS:  T(F): 99 (06-23-23 @ 11:30), Max: 100.2 (06-23-23 @ 08:15)  HR: 90 (06-23-23 @ 11:30)  BP: 140/53 (06-23-23 @ 11:30)  RR: 25 (06-23-23 @ 11:30)  SpO2: 95% (06-23-23 @ 11:30)  Wt(kg): --    06-22 @ 07:01  -  06-23 @ 07:00  --------------------------------------------------------  IN: 2205.5 mL / OUT: 1515 mL / NET: 690.5 mL    06-23 @ 07:01  -  06-23 @ 11:59  --------------------------------------------------------  IN: 600 mL / OUT: 1600 mL / NET: -1000 mL      PHYSICAL EXAM:  Constitutional: intubated, off sedation but not opening eyes  HEENT: +ETT  Neck: No JVD  Respiratory: mechanical BS B/L  Cardiovascular: RRR, S1S2  Gastrointestinal: +ostomy R upper abdomen, midline incision, + distended but still soft.  Extremities: trace LE edema b/l/ +sacral edema  :  +langley +scrotal edema  Access: Rt IJ non tunneled HD catheter- benign    LABS:  06-23    143  |  108  |  101<H>  ----------------------------<  146<H>  4.4   |  26  |  4.98<H>    Ca    7.7<L>      23 Jun 2023 03:48  Phos  4.9     06-23  Mg     2.0     06-23    TPro  5.1<L>  /  Alb  1.9<L>  /  TBili  1.0  /  DBili      /  AST  102<H>  /  ALT  67<H>  /  AlkPhos  64  06-23    Creatinine Trend: 4.98 <--, 4.52 <--, 5.41 <--, 4.79 <--, 4.70 <--, 3.90 <--, 3.88 <--, 3.65 <--, 3.47 <--, 3.39 <--, 3.55 <--, 3.25 <--, 3.16 <--, 2.87 <--, 2.59 <--, 2.60 <--, 2.54 <--, 1.67 <--, 1.69 <--, 2.08 <--                        9.3    9.38  )-----------( 53       ( 23 Jun 2023 03:48 )             27.6     Urine Studies:  Urinalysis Basic - ( 23 Jun 2023 03:48 )    Color:  / Appearance:  / SG:  / pH:   Gluc: 146 mg/dL / Ketone:   / Bili:  / Urobili:    Blood:  / Protein:  / Nitrite:    Leuk Esterase:  / RBC:  / WBC    Sq Epi:  / Non Sq Epi:  / Bacteria:       Sodium, Random Urine: 58 mmol/L (06-19 @ 09:50)  Potassium, Random Urine: 54 mmol/L (06-19 @ 09:50)  Creatinine, Random Urine: 76 mg/dL (06-19 @ 09:50)

## 2023-06-23 NOTE — PROGRESS NOTE ADULT - ASSESSMENT
78 y/o male with PMHx of HTN , HLD, Asthma, no prior abdominal surgeries, complains of severe generalized abdominal pain and vomiting since about 8 PM.  Pain is described as sharp , persistent in the epigastric area with multiple > 4NBNB vomiting . Last BM was yesterday morning with persistent flatus. Mild leucocytosis, dehydrated , lactate is 2.1 . CT shows SBO. Pt was taken to the OR for diagnostic laproscopy which was converted to exploratory lap with ileostomy due to spillage. ICU was consulted as pt was requiring pressor support during and post surgery.     #SBO w/ abdominal perforation, spillage of gastric contents   #AHRF  #Pankaj vs PANKAJ on CKD  #HTN  #HLD  #Asthma      Plan:  Neuro:  Currently intubated and Sedated.   fentanyl 1mg/kg/hr, versed 0.04 mg/kg/hr, will take of versed, start SAT  Baseline AOx3    Cardiovascular:  #Shock - Septic Shock 2/2 to SBO complicated by abdominal perforation with spillage of gastric contents during surgery  Currently requiring pressor support with levo and vaso, decreasing requirements   Will titrate down as tolerated  Fluid status- improved urine output, however still signs of edema on physical exam   given bumex 2mg ivp x1   albumin 250ml q8 x2 (as per surgery request)     #AFib  afib w/ rate to 120-130, will hold off on amiodarone for now   continue to monitor     #HTN   Pt has history of HTN on amlodipine.  Will hold in the setting of shock.     Pulmonary:   #Acute Hypoxic Respiratory Failure 2/2 to fluid overload vs. pneumonia vs. ARDS  POCUS exam 6/19- diffuse B-lines, signs of retrocardiac consolidations    P/F ratio: <300, mild-mod hypoxemia, indicative of ARDS   pt. on FiO2 40%, PEEP 8 today, will keep vented   pt on meropenem, will cover PNA   SAT/SBT    Infectious Diseases:  #Septic Shock 2/2 to SBO complicated by abdominal perforation with spillage of gastric contents during surgery  s/p exploratory laproscopy  s/p ileostomy   will keep npo   NGT in place, suctioning 200cc in overnight     will advance diet as per surgery - start trickle feeds   Will cover with meropenem      Gastrointestinal:  #SBO  s/p exploratory laproscopy.   s/p ileostomy   will keep npo   NGT in place, suctioning 200cc in overnight     will advance diet as per surgery - start trickle feeds   Will cover with meropenem    #Transaminitis   slighty uptrend in LFTs  likely in the setting of sepsis    Renal:  #ATN due to sepsis   Pt w/ SCr 2.08 on admission  Baseline SCr - Unknown  Scr 5.4, up-trending   Urine lytes- show intrinsic picture, likely ATN   *will undergo HD today, RIJ to be placed  hold IVF due to risk of fluid overload  follow BMP daily    #Metabolic Acidosis  ABG 7.31/49/165/25 this AM   Metabolic acidosis likely due to septic shock, lactic acidosis   Acidosis improving after adjustment of the vent settings  Nephro Dr. Brown      #Hyperkalemia   Potassium 5.7 > 6.2 > 5.5 > 5.4   s/p hyperkalemia cocktail x2 overnight  resolving     Heme/onc:  #Thrombocytopenia possibly DIC   - Plts downtrending, ~48 today (came in with 200s)  - pt never received heparin products   - possibly in the setting of infection, ITP, DIC   - fibrinogen 902> 116 (lab error) > 810, >362> 353, hapto 219  - platelet count blue top- 58    Endo:   #HLD  Pt has history of HLD  On Rosuvastatin 10 at home   Will continue as Atorvastatin 40 when given clearance by Surgery to advance diet.     Skin/ catheter: -   Fem central Line d/c'd  LIJ central line 6/20  [place] - 6/17-6/18  R Ax [place] - 6/18  Caude langley - 6/17    Prophylaxis:   DVT: SCD - Advance to chemical as per surgery.   GI: PPI     Goals of Care: Full Code    Dispo: Will continue to monitor in ICU.      78 y/o male with PMHx of HTN , HLD, Asthma, no prior abdominal surgeries, complains of severe generalized abdominal pain and vomiting since about 8 PM.  Pain is described as sharp , persistent in the epigastric area with multiple > 4NBNB vomiting . Last BM was yesterday morning with persistent flatus. Mild leucocytosis, dehydrated , lactate is 2.1 . CT shows SBO. Pt was taken to the OR for diagnostic laproscopy which was converted to exploratory lap with ileostomy due to spillage. ICU was consulted as pt was requiring pressor support during and post surgery.     #SBO w/ abdominal perforation, spillage of gastric contents   #AHRF  #Pankaj vs PANKAJ on CKD  #HTN  #HLD  #Asthma      Plan:  Neuro:  Currently intubated and Sedated.   fentanyl 1mg/kg/hr, versed 0.04 mg/kg/hr, will take of versed, start SAT  Baseline AOx3    Cardiovascular:  #Shock - Septic Shock 2/2 to SBO complicated by abdominal perforation with spillage of gastric contents during surgery  Currently requiring pressor support with levo and vaso  Will titrate down as tolerated  Fluid status- improved urine output, however still signs of edema on physical exam   given bumex 2mg ivp x1   albumin 250ml q8 x2 (as per surgery request)     #AFib  afib w/ rate to 120-130, will hold off on amiodarone for now   continue to monitor     #HTN   Pt has history of HTN on amlodipine.  Will hold in the setting of shock.     Pulmonary:   #Acute Hypoxic Respiratory Failure 2/2 to fluid overload vs. pneumonia vs. ARDS  POCUS exam 6/19- diffuse B-lines, signs of retrocardiac consolidations    P/F ratio: <300, mild-mod hypoxemia, indicative of ARDS   pt. on FiO2 40%, PEEP 8 today, will keep vented   pt on meropenem, will cover PNA   SAT/SBT    Infectious Diseases:  #Septic Shock 2/2 to SBO complicated by abdominal perforation with spillage of gastric contents during surgery  s/p exploratory laproscopy  s/p ileostomy   will keep npo   NGT in place, suctioning 200cc in overnight     will advance diet as per surgery - start trickle feeds   Will cover with meropenem      Gastrointestinal:  #SBO  s/p exploratory laproscopy.   s/p ileostomy   will keep npo   NGT in place, suctioning 200cc in overnight     will advance diet as per surgery - start trickle feeds   Will cover with meropenem    #Transaminitis   slighty uptrend in LFTs  likely in the setting of sepsis    Renal:  #ATN due to sepsis   Pt w/ SCr 2.08 on admission  Baseline SCr - Unknown  Scr 5.4, up-trending   Urine lytes- show intrinsic picture, likely ATN   *will undergo HD today, RIJ to be placed  hold IVF due to risk of fluid overload  follow BMP daily    #Metabolic Acidosis  ABG 7.31/49/165/25 this AM   Metabolic acidosis likely due to septic shock, lactic acidosis   Acidosis improving after adjustment of the vent settings  Nephro Dr. Brown      #Hyperkalemia   Potassium 5.7 > 6.2 > 5.5 > 5.4   s/p hyperkalemia cocktail x2 overnight  resolving     Heme/onc:  #Thrombocytopenia possibly DIC   - Plts downtrending, ~48 today (came in with 200s)  - pt never received heparin products   - possibly in the setting of infection, ITP, DIC   - fibrinogen 902> 116 (lab error) > 810, >362> 353, hapto 219  - platelet count blue top- 58    Endo:   #HLD  Pt has history of HLD  On Rosuvastatin 10 at home   Will continue as Atorvastatin 40 when given clearance by Surgery to advance diet.     Skin/ catheter: -   Fem central Line d/c'd  LIJ central line 6/20  [place] - 6/17-6/18  R Ax lori - 6/18  Caude langley - 6/17    Prophylaxis:   DVT: SCD - Advance to chemical as per surgery.   GI: PPI     Goals of Care: Full Code    Dispo: Will continue to monitor in ICU.      78 y/o male with PMHx of HTN , HLD, Asthma, no prior abdominal surgeries, complains of severe generalized abdominal pain and vomiting since about 8 PM.  Pain is described as sharp , persistent in the epigastric area with multiple > 4NBNB vomiting . Last BM was yesterday morning with persistent flatus. Mild leucocytosis, dehydrated , lactate is 2.1 . CT shows SBO. Pt was taken to the OR for diagnostic laproscopy which was converted to exploratory lap with ileostomy due to spillage. ICU was consulted as pt was requiring pressor support during and post surgery.     #SBO w/ abdominal perforation, spillage of gastric contents   #AHRF 2/2 PNA, ARDS  #Pankaj vs PANKAJ on CKD  #HTN  #HLD  #Asthma      Plan:  Neuro:  Currently intubated, no longer sedated, still not waking up, no purposeful movements or withdrawal to pain  Baseline AOx3  f/u CTH to evaluate for hemorrhage, stroke   will hold on     Cardiovascular:  #Shock - Septic Shock 2/2 to SBO complicated by abdominal perforation with spillage of gastric contents during surgery  Currently requiring pressor support with levo and vaso  Will titrate down as tolerated  Fluid status- improved urine output, however still signs of edema on physical exam   given bumex 2mg ivp x1   albumin 250ml q8 x2 (as per surgery request)     #AFib  afib w/ rate to 120-130, will hold off on amiodarone for now   continue to monitor     #HTN   Pt has history of HTN on amlodipine.  Will hold in the setting of shock.     Pulmonary:   #Acute Hypoxic Respiratory Failure 2/2 to fluid overload vs. pneumonia vs. ARDS  POCUS exam 6/19- diffuse B-lines, signs of retrocardiac consolidations    P/F ratio: <300, mild-mod hypoxemia, indicative of ARDS   pt. on FiO2 40%, PEEP 8 today, will keep vented   pt on meropenem, will cover PNA   SAT/SBT    Infectious Diseases:  #Septic Shock 2/2 to SBO complicated by abdominal perforation with spillage of gastric contents during surgery  s/p exploratory laproscopy  s/p ileostomy   will keep npo   NGT in place, suctioning 200cc in overnight     will advance diet as per surgery - start trickle feeds   Will cover with meropenem      Gastrointestinal:  #SBO  s/p exploratory laproscopy.   s/p ileostomy   will keep npo   NGT in place, suctioning 200cc in overnight     will advance diet as per surgery - start trickle feeds   Will cover with meropenem    #Transaminitis   slighty uptrend in LFTs  likely in the setting of sepsis    Renal:  #ATN due to sepsis   Pt w/ SCr 2.08 on admission  Baseline SCr - Unknown  Scr 5.4, up-trending   Urine lytes- show intrinsic picture, likely ATN   *will undergo HD today, RIJ to be placed  hold IVF due to risk of fluid overload  follow BMP daily    #Metabolic Acidosis  ABG 7.31/49/165/25 this AM   Metabolic acidosis likely due to septic shock, lactic acidosis   Acidosis improving after adjustment of the vent settings  Nephro Dr. Brown      #Hyperkalemia   Potassium 5.7 > 6.2 > 5.5 > 5.4   s/p hyperkalemia cocktail x2 overnight  resolving     Heme/onc:  #Thrombocytopenia possibly DIC   - Plts downtrending, ~48 today (came in with 200s)  - pt never received heparin products   - possibly in the setting of infection, ITP, DIC   - fibrinogen 902> 116 (lab error) > 810, >362> 353, hapto 219  - platelet count blue top- 58    Endo:   #HLD  Pt has history of HLD  On Rosuvastatin 10 at home   Will continue as Atorvastatin 40 when given clearance by Surgery to advance diet.     Skin/ catheter: -   Fem central Line d/c'd  LIJ central line 6/20  [place] - 6/17-6/18  R Ax lori - 6/18  Caude langley - 6/17    Prophylaxis:   DVT: SCD - Advance to chemical as per surgery.   GI: PPI     Goals of Care: Full Code    Dispo: Will continue to monitor in ICU.      76 y/o male with PMHx of HTN , HLD, Asthma, no prior abdominal surgeries, complains of severe generalized abdominal pain and vomiting since about 8 PM.  Pain is described as sharp , persistent in the epigastric area with multiple > 4NBNB vomiting . Last BM was yesterday morning with persistent flatus. Mild leucocytosis, dehydrated , lactate is 2.1 . CT shows SBO. Pt was taken to the OR for diagnostic laproscopy which was converted to exploratory lap with ileostomy due to spillage. ICU was consulted as pt was requiring pressor support during and post surgery.     #SBO w/ abdominal perforation, spillage of gastric contents   #AHRF 2/2 PNA, ARDS  #Pankaj vs PANKAJ on CKD  #HTN  #HLD  #Asthma      Plan:  Neuro:  Currently intubated, no longer sedated, still not waking up, no purposeful movements or withdrawal to pain  Baseline AOx3  f/u CTH to evaluate for hemorrhage, stroke   will hold on heparin gtt until imaging done     Cardiovascular:  #Shock - Septic Shock 2/2 to SBO complicated by abdominal perforation with spillage of gastric contents during surgery  Currently requiring pressor support with levo and vaso  Will titrate down as tolerated  Fluid status- worsening urine output,  edema on physical exam   started on HD 6/22, 6/23, and tomorrow 6/25  Removing 1 L today     #AFib  afib w/ rate to 120-130 initially, improved  continue to monitor     #HTN   Pt has history of HTN on amlodipine.  Will hold in the setting of shock.     Pulmonary:   #Acute Hypoxic Respiratory Failure 2/2 to fluid overload vs. pneumonia vs. ARDS  POCUS exam 6/19- diffuse B-lines, signs of retrocardiac consolidations    P/F ratio: <300, mild-mod hypoxemia, indicative of ARDS   pt. on FiO2 40%, PEEP 8 today, will keep vented   pt on meropenem, will cover PNA   SAT/SBT    Infectious Diseases:  #Septic Shock 2/2 to SBO complicated by abdominal perforation with spillage of gastric contents during surgery  s/p exploratory laproscopy  s/p ileostomy   will keep npo   NGT in place, stopped suction, started tube feeds 6/22  TF held today  Temp spiking since this AM, possible loculation of infection in abdomen   f/u CT abdomen pelvis w/ IV and PO contrast to assess for infection  surgery aware  c/w meropenem      Gastrointestinal:  #SBO  s/p exploratory laproscopy.   s/p ileostomy   will keep npo   NGT in place, feeds held   c/w meropenem    #Transaminitis   slighty uptrend in LFTs  likely in the setting of sepsis    Renal:  #ATN due to sepsis   Pt w/ SCr 2.08 on admission  Baseline SCr - Unknown  Scr 5.4, up-trending   Urine lytes- show intrinsic picture, likely ATN   *will undergo HD today, RIJ to be placed  hold IVF due to risk of fluid overload  follow BMP daily    #Metabolic Acidosis  ABG 7.34/49/86/26 this AM   Metabolic acidosis likely due to septic shock, lactic acidosis   Acidosis improving after adjustment of the vent settings  Nephro Dr. Brown      #Hyperkalemia   Potassium 5.7 > 6.2 > 5.5 > 5.4 > 4.4 today  resolving     Heme/onc:  #Thrombocytopenia possibly DIC   - Plts downtrending, ~53 today (came in with 200s)  - pt never received heparin products   - possibly in the setting of infection, ITP, DIC   - fibrinogen 902> 116 (lab error) > 810, >362> 353, hapto 219  - platelet count blue top- 58    Endo:   #HLD  Pt has history of HLD  On Rosuvastatin 10 at home   Will continue as Atorvastatin 40 when given clearance by Surgery to advance diet.     Skin/ catheter: -   Fem central Line d/c'd  LIJ central line 6/20  [place] - 6/17-6/18  R Ax lori - 6/18  Caude langley - 6/17    Prophylaxis:   DVT: SCD - Advance to chemical as per surgery.   GI: PPI     Goals of Care: Full Code    Dispo: Will continue to monitor in ICU.

## 2023-06-23 NOTE — PROGRESS NOTE ADULT - SUBJECTIVE AND OBJECTIVE BOX
INTERVAL HPI/OVERNIGHT EVENTS:    Pt seen and examined at bedside. S/p ex lap with open LOU, SBR, ileostomy 6/17 POD#6. Pt intubated, off sedation.  No overnight events.     Vital Signs Last 24 Hrs  T(C): 37.9 (23 Jun 2023 08:45), Max: 37.9 (23 Jun 2023 08:15)  T(F): 100.2 (23 Jun 2023 08:45), Max: 100.2 (23 Jun 2023 08:15)  HR: 88 (23 Jun 2023 08:45) (56 - 88)  BP: 129/55 (23 Jun 2023 08:00) (110/48 - 129/55)  BP(mean): 76 (23 Jun 2023 08:00) (63 - 76)  RR: 28 (23 Jun 2023 08:45) (0 - 30)  SpO2: 93% (23 Jun 2023 08:45) (93% - 98%)    Parameters below as of 22 Jun 2023 19:13  Patient On (Oxygen Delivery Method): ventilator      I&O's Detail    22 Jun 2023 07:01  -  23 Jun 2023 07:00  --------------------------------------------------------  IN:    IV PiggyBack: 50 mL    IV PiggyBack: 500 mL    Nepro with Carb Steady: 120 mL    Norepinephrine: 437.7 mL    Norepinephrine: 60.8 mL    Other (mL): 800 mL    Platelets - Single Donor: 225 mL    Vasopressin: 12 mL  Total IN: 2205.5 mL    OUT:    Ileostomy (mL): 15 mL    Indwelling Catheter - Urethral (mL): 300 mL    Nasogastric/Oral tube (mL): 400 mL    Other (mL): 800 mL  Total OUT: 1515 mL    Total NET: 690.5 mL        meropenem  IVPB 1000 milliGRAM(s) IV Intermittent every 24 hours  pantoprazole  Injectable 40 milliGRAM(s) IV Push daily      Physical Exam  General: AAOx3, No acute distress  Skin: No jaundice, no icterus  Abdomen: Midline staples intact. Nonerythematous. Packing changed at bedside with gauze/tape dressing. No active bleeding or drainage noted. Ostomy pink/viable, purple hue noted on portion. +bowel sweat.   : Normal external genitalia  Extremities: non edematous, no calf pain bilaterally    Drains/Tubes:   NGT in place to \[facility]\"",  recorded 550cc for shift.     Labs:                        9.3    9.38  )-----------( 53       ( 23 Jun 2023 03:48 )             27.6     06-23    143  |  108  |  101<H>  ----------------------------<  146<H>  4.4   |  26  |  4.98<H>    Ca    7.7<L>      23 Jun 2023 03:48  Phos  4.9     06-23  Mg     2.0     06-23    TPro  5.1<L>  /  Alb  1.9<L>  /  TBili  1.0  /  DBili  x   /  AST  102<H>  /  ALT  67<H>  /  AlkPhos  64  06-23    PT/INR - ( 23 Jun 2023 03:48 )   PT: 15.9 sec;   INR: 1.33 ratio         PTT - ( 22 Jun 2023 19:53 )  PTT:30.0 sec    RADIOLOGY & ADDITIONAL STUDIES:    77yMale

## 2023-06-23 NOTE — PROGRESS NOTE ADULT - SUBJECTIVE AND OBJECTIVE BOX
INTERVAL HPI/OVERNIGHT EVENTS: Pt. underwent HD yesterday, found to have b/l DVTs  on POCUS exam while trying to replace HD non-tunneled catheter access.     PRESSORS: [ ] YES [ ] NO  WHICH:    Antimicrobial:  meropenem  IVPB 1000 milliGRAM(s) IV Intermittent every 24 hours    Cardiovascular:  norepinephrine Infusion 0.45 MICROgram(s)/kG/Min IV Continuous <Continuous>    Pulmonary:    Hematalogic:  heparin   Injectable 5000 Unit(s) SubCutaneous every 8 hours    Other:  chlorhexidine 2% Cloths 1 Application(s) Topical <User Schedule>  diatrizoate meglumine/diatrizoate sodium. 30 milliLiter(s) Oral once  insulin lispro (ADMELOG) corrective regimen sliding scale   SubCutaneous every 6 hours  mupirocin 2% Ointment 1 Application(s) Both Nostrils two times a day  pantoprazole  Injectable 40 milliGRAM(s) IV Push daily  sodium chloride 0.9% lock flush 10 milliLiter(s) IV Push every 1 hour PRN    chlorhexidine 2% Cloths 1 Application(s) Topical <User Schedule>  diatrizoate meglumine/diatrizoate sodium. 30 milliLiter(s) Oral once  heparin   Injectable 5000 Unit(s) SubCutaneous every 8 hours  insulin lispro (ADMELOG) corrective regimen sliding scale   SubCutaneous every 6 hours  meropenem  IVPB 1000 milliGRAM(s) IV Intermittent every 24 hours  mupirocin 2% Ointment 1 Application(s) Both Nostrils two times a day  norepinephrine Infusion 0.45 MICROgram(s)/kG/Min IV Continuous <Continuous>  pantoprazole  Injectable 40 milliGRAM(s) IV Push daily  sodium chloride 0.9% lock flush 10 milliLiter(s) IV Push every 1 hour PRN    Drug Dosing Weight  Height (cm): 170 (17 Jun 2023 01:51)  Weight (kg): 81 (17 Jun 2023 19:13)  BMI (kg/m2): 28 (17 Jun 2023 19:13)  BSA (m2): 1.93 (17 Jun 2023 19:13)    CENTRAL LINE: [ ] YES [ ] NO  LOCATION:   DATE INSERTED:  REMOVE: [ ] YES [ ] NO  EXPLAIN:    MCWILLIAMS: [ ] YES [ ] NO    DATE INSERTED:  REMOVE:  [ ] YES [ ] NO  EXPLAIN:    A-LINE:  [ ] YES [ ] NO  LOCATION:   DATE INSERTED:  REMOVE:  [ ] YES [ ] NO  EXPLAIN:    PMH -reviewed admission note, no change since admission  PAST MEDICAL & SURGICAL HISTORY:  Hypertension      History of asthma      History of high cholesterol      No significant past surgical history          ICU Vital Signs Last 24 Hrs  T(C): 37.2 (23 Jun 2023 11:30), Max: 37.9 (23 Jun 2023 08:15)  T(F): 99 (23 Jun 2023 11:30), Max: 100.2 (23 Jun 2023 08:15)  HR: 90 (23 Jun 2023 11:30) (58 - 95)  BP: 140/53 (23 Jun 2023 11:30) (101/52 - 141/64)  BP(mean): 76 (23 Jun 2023 11:30) (63 - 85)  ABP: 125/56 (23 Jun 2023 11:30) (87/44 - 152/59)  ABP(mean): 80 (23 Jun 2023 11:30) (57 - 93)  RR: 25 (23 Jun 2023 11:30) (0 - 30)  SpO2: 95% (23 Jun 2023 11:30) (93% - 98%)    O2 Parameters below as of 23 Jun 2023 11:10  Patient On (Oxygen Delivery Method): ventilator    O2 Concentration (%): 40        ABG - ( 23 Jun 2023 03:16 )  pH, Arterial: 7.34  pH, Blood: x     /  pCO2: 49    /  pO2: 86    / HCO3: 26    / Base Excess: 0.0   /  SaO2: 98                    06-22 @ 07:01  -  06-23 @ 07:00  --------------------------------------------------------  IN: 2205.5 mL / OUT: 1515 mL / NET: 690.5 mL        Mode: AC/ CMV (Assist Control/ Continuous Mandatory Ventilation)  RR (machine): 22  TV (machine): 400  FiO2: 40  PEEP: 8  ITime: 0.9  MAP: 16  PIP: 29      PHYSICAL EXAM:    GENERAL: NAD, well-groomed, well-developed  HEAD:  Atraumatic, Normocephalic  EYES: EOMI, PERRLA, conjunctiva and sclera clear  ENMT: No tonsillar erythema, exudates, or enlargement; Moist mucous membranes, Good dentition, No lesions  NECK: Supple, normal appearance, No JVD; Normal thyroid; Trachea midline  NERVOUS SYSTEM:  Alert & Oriented X3,  Motor Strength 5/5 B/L upper and lower extremities; DTRs 2+ intact and symmetric  CHEST/LUNG: No chest deformity; Normal percussion bilaterally; No rales, rhonchi, wheezing   HEART: Regular rate and rhythm; No murmurs, rubs, or gallops  ABDOMEN: Soft, Nontender, Nondistended; Bowel sounds present  EXTREMITIES:  2+ Peripheral Pulses, No clubbing, cyanosis, or edema  LYMPH: No lymphadenopathy noted  SKIN: No rashes or lesions;  Good capillary refill      LABS:  CBC Full  -  ( 23 Jun 2023 03:48 )  WBC Count : 9.38 K/uL  RBC Count : 3.16 M/uL  Hemoglobin : 9.3 g/dL  Hematocrit : 27.6 %  Platelet Count - Automated : 53 K/uL  Mean Cell Volume : 87.3 fl  Mean Cell Hemoglobin : 29.4 pg  Mean Cell Hemoglobin Concentration : 33.7 gm/dL  Auto Neutrophil # : 8.35 K/uL  Auto Lymphocyte # : 0.66 K/uL  Auto Monocyte # : 0.00 K/uL  Auto Eosinophil # : 0.00 K/uL  Auto Basophil # : 0.00 K/uL  Auto Neutrophil % : 85.0 %  Auto Lymphocyte % : 7.0 %  Auto Monocyte % : 0.0 %  Auto Eosinophil % : 0.0 %  Auto Basophil % : 0.0 %    06-23    143  |  108  |  101<H>  ----------------------------<  146<H>  4.4   |  26  |  4.98<H>    Ca    7.7<L>      23 Jun 2023 03:48  Phos  4.9     06-23  Mg     2.0     06-23    TPro  5.1<L>  /  Alb  1.9<L>  /  TBili  1.0  /  DBili  x   /  AST  102<H>  /  ALT  67<H>  /  AlkPhos  64  06-23    PT/INR - ( 23 Jun 2023 03:48 )   PT: 15.9 sec;   INR: 1.33 ratio         PTT - ( 22 Jun 2023 19:53 )  PTT:30.0 sec  Urinalysis Basic - ( 23 Jun 2023 03:48 )    Color: x / Appearance: x / SG: x / pH: x  Gluc: 146 mg/dL / Ketone: x  / Bili: x / Urobili: x   Blood: x / Protein: x / Nitrite: x   Leuk Esterase: x / RBC: x / WBC x   Sq Epi: x / Non Sq Epi: x / Bacteria: x          RADIOLOGY & ADDITIONAL STUDIES REVIEWED:  ***    [ ]GOALS OF CARE DISCUSSION WITH PATIENT/FAMILY/PROXY:    CRITICAL CARE TIME SPENT: 35 minutes INTERVAL HPI/OVERNIGHT EVENTS: Pt. underwent HD yesterday, found to have b/l DVTs  on POCUS exam while trying to replace HD non-tunneled catheter access. Currently has RIJ. Received HD today, removing 1 L. Off sedation, pt not waking up. Will get imaging of head and repeat imaging of abdomen today. RE-started vaso    PRESSORS: [x ] YES [ ] NO  WHICH: vaso, and levo     Antimicrobial:  meropenem  IVPB 1000 milliGRAM(s) IV Intermittent every 24 hours    Cardiovascular:  norepinephrine Infusion 0.45 MICROgram(s)/kG/Min IV Continuous <Continuous>    Pulmonary:    Hematalogic:  heparin   Injectable 5000 Unit(s) SubCutaneous every 8 hours    Other:  chlorhexidine 2% Cloths 1 Application(s) Topical <User Schedule>  diatrizoate meglumine/diatrizoate sodium. 30 milliLiter(s) Oral once  insulin lispro (ADMELOG) corrective regimen sliding scale   SubCutaneous every 6 hours  mupirocin 2% Ointment 1 Application(s) Both Nostrils two times a day  pantoprazole  Injectable 40 milliGRAM(s) IV Push daily  sodium chloride 0.9% lock flush 10 milliLiter(s) IV Push every 1 hour PRN    chlorhexidine 2% Cloths 1 Application(s) Topical <User Schedule>  diatrizoate meglumine/diatrizoate sodium. 30 milliLiter(s) Oral once  heparin   Injectable 5000 Unit(s) SubCutaneous every 8 hours  insulin lispro (ADMELOG) corrective regimen sliding scale   SubCutaneous every 6 hours  meropenem  IVPB 1000 milliGRAM(s) IV Intermittent every 24 hours  mupirocin 2% Ointment 1 Application(s) Both Nostrils two times a day  norepinephrine Infusion 0.45 MICROgram(s)/kG/Min IV Continuous <Continuous>  pantoprazole  Injectable 40 milliGRAM(s) IV Push daily  sodium chloride 0.9% lock flush 10 milliLiter(s) IV Push every 1 hour PRN    Drug Dosing Weight  Height (cm): 170 (17 Jun 2023 01:51)  Weight (kg): 81 (17 Jun 2023 19:13)  BMI (kg/m2): 28 (17 Jun 2023 19:13)  BSA (m2): 1.93 (17 Jun 2023 19:13)    CENTRAL LINE: [x ] YES [ ] NO  LOCATION:   DATE INSERTED:  REMOVE: [ ] YES [ ] NO  EXPLAIN:    MCWILLIAMS: [ x] YES [ ] NO    DATE INSERTED:  REMOVE:  [ ] YES [ ] NO  EXPLAIN:    A-LINE:  [x ] YES [ ] NO  LOCATION:   DATE INSERTED:  REMOVE:  [ ] YES [ ] NO  EXPLAIN:    PMH -reviewed admission note, no change since admission  PAST MEDICAL & SURGICAL HISTORY:  Hypertension      History of asthma      History of high cholesterol      No significant past surgical history          ICU Vital Signs Last 24 Hrs  T(C): 37.2 (23 Jun 2023 11:30), Max: 37.9 (23 Jun 2023 08:15)  T(F): 99 (23 Jun 2023 11:30), Max: 100.2 (23 Jun 2023 08:15)  HR: 90 (23 Jun 2023 11:30) (58 - 95)  BP: 140/53 (23 Jun 2023 11:30) (101/52 - 141/64)  BP(mean): 76 (23 Jun 2023 11:30) (63 - 85)  ABP: 125/56 (23 Jun 2023 11:30) (87/44 - 152/59)  ABP(mean): 80 (23 Jun 2023 11:30) (57 - 93)  RR: 25 (23 Jun 2023 11:30) (0 - 30)  SpO2: 95% (23 Jun 2023 11:30) (93% - 98%)    O2 Parameters below as of 23 Jun 2023 11:10  Patient On (Oxygen Delivery Method): ventilator    O2 Concentration (%): 40        ABG - ( 23 Jun 2023 03:16 )  pH, Arterial: 7.34  pH, Blood: x     /  pCO2: 49    /  pO2: 86    / HCO3: 26    / Base Excess: 0.0   /  SaO2: 98                    06-22 @ 07:01  -  06-23 @ 07:00  --------------------------------------------------------  IN: 2205.5 mL / OUT: 1515 mL / NET: 690.5 mL        Mode: AC/ CMV (Assist Control/ Continuous Mandatory Ventilation)  RR (machine): 22  TV (machine): 400  FiO2: 40  PEEP: 8  ITime: 0.9  MAP: 16  PIP: 29      PHYSICAL EXAM:      GENERAL: NAD, intubated and sedated this AM   HEAD:  Atraumatic, Normocephalic  EYES: EOMI, PERRLA, (+)  pupils pinpoint 1mm  ENMT: No tonsillar erythema, exudates, or enlargement; Moist mucous membranes, Good dentition, No lesions  NECK: Supple, normal appearance, No JVD; Normal thyroid; Trachea midline  NERVOUS SYSTEM:  No longer on sedation, no withdrawal to pain, neg gag reflex   CHEST/LUNG: No chest deformity; Normal percussion bilaterally; No rales, rhonchi, wheezing   HEART: Regular rate and rhythm; No murmurs, rubs, or gallops  ABDOMEN: Soft, Nontender, distended; Bowel sounds present, (+) ileostomy in place draining 15cc of fluid, surgical scar w/ clean dressing.    EXTREMITIES:  2+ Peripheral Pulses, No clubbing, cyanosis, (+) upper and lower extremity edema, scrotal edema   LYMPH: No lymphadenopathy noted  SKIN: No rashes or lesions;  Good capillary refill          LABS:  CBC Full  -  ( 23 Jun 2023 03:48 )  WBC Count : 9.38 K/uL  RBC Count : 3.16 M/uL  Hemoglobin : 9.3 g/dL  Hematocrit : 27.6 %  Platelet Count - Automated : 53 K/uL  Mean Cell Volume : 87.3 fl  Mean Cell Hemoglobin : 29.4 pg  Mean Cell Hemoglobin Concentration : 33.7 gm/dL  Auto Neutrophil # : 8.35 K/uL  Auto Lymphocyte # : 0.66 K/uL  Auto Monocyte # : 0.00 K/uL  Auto Eosinophil # : 0.00 K/uL  Auto Basophil # : 0.00 K/uL  Auto Neutrophil % : 85.0 %  Auto Lymphocyte % : 7.0 %  Auto Monocyte % : 0.0 %  Auto Eosinophil % : 0.0 %  Auto Basophil % : 0.0 %    06-23    143  |  108  |  101<H>  ----------------------------<  146<H>  4.4   |  26  |  4.98<H>    Ca    7.7<L>      23 Jun 2023 03:48  Phos  4.9     06-23  Mg     2.0     06-23    TPro  5.1<L>  /  Alb  1.9<L>  /  TBili  1.0  /  DBili  x   /  AST  102<H>  /  ALT  67<H>  /  AlkPhos  64  06-23    PT/INR - ( 23 Jun 2023 03:48 )   PT: 15.9 sec;   INR: 1.33 ratio         PTT - ( 22 Jun 2023 19:53 )  PTT:30.0 sec  Urinalysis Basic - ( 23 Jun 2023 03:48 )    Color: x / Appearance: x / SG: x / pH: x  Gluc: 146 mg/dL / Ketone: x  / Bili: x / Urobili: x   Blood: x / Protein: x / Nitrite: x   Leuk Esterase: x / RBC: x / WBC x   Sq Epi: x / Non Sq Epi: x / Bacteria: x          RADIOLOGY & ADDITIONAL STUDIES REVIEWED:  ***    [ ]GOALS OF CARE DISCUSSION WITH PATIENT/FAMILY/PROXY:    CRITICAL CARE TIME SPENT: 35 minutes

## 2023-06-24 NOTE — PROGRESS NOTE ADULT - ASSESSMENT
78 y/o male s/p dx lap converted to ex lap LOU, SBRx2 with ileostomy creation POD#7  -NPO, IVF  -IV abx  -continue NGT to LWS, irrigate prn  -Continue local wound care/packing changes daily   -Monitor ostomy fxn   -Remainder of care per ICU/Primary team  -Discussed with attending

## 2023-06-24 NOTE — CHART NOTE - NSCHARTNOTEFT_GEN_A_CORE
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 CXR ordered for confirmation. Right non-tunneled hemodialysis catheter retracted 2 more cm (total of 4cm retracted), due to the fact that the catheter was found to be in the hepatic IVC (as confirmed by CT scan 6/23). CXR ordered for confirmation.

## 2023-06-24 NOTE — PROGRESS NOTE ADULT - SUBJECTIVE AND OBJECTIVE BOX
[facility] NEPHROLOGY- PROGRESS NOTE    Patient is a 77y Male with HTN , HLD, Asthma p/w abd pain a/w SBO s/p OR 6/17 dx lap converted to ex lap LOU, SBRx2 with ileostomy creation due to spillage. Pt with Septic shock, hypotension on pressors with GA and hyperkalemia. Nephrology consulted for Elevated serum creatinine.      REVIEW OF SYSTEMS: unable to obtain; pt intubated       VITALS:  T(F): 98.8 (06-24-23 @ 13:30), Max: 100 (06-23-23 @ 19:15)  HR: 74 (06-24-23 @ 13:30)  BP: 149/59 (06-24-23 @ 04:00)  RR: 25 (06-24-23 @ 13:30)  SpO2: 100% (06-24-23 @ 13:30)  Wt(kg): --    06-23 @ 07:01  -  06-24 @ 07:00  --------------------------------------------------------  IN: 1604.9 mL / OUT: 1780 mL / NET: -175.1 mL    06-24 @ 07:01  -  06-24 @ 15:11  --------------------------------------------------------  IN: 372.9 mL / OUT: 1550 mL / NET: -1177.1 mL        PHYSICAL EXAM:  Constitutional: intubated, off sedation  HEENT: +ETT  Neck: No JVD  Respiratory: mechanical BS B/L  Cardiovascular: RRR, S1S2  Gastrointestinal: +ostomy R upper abdomen, midline incision, + distended but still soft.  Extremities: + LE edema b/l/ +sacral edema  :  +langley +scrotal edema  Access: Rt IJ non tunneled HD catheter- benign      LABS:  06-24    141  |  104  |  99<H>  ----------------------------<  204<H>  4.9   |  24  |  4.76<H>    Ca    7.6<L>      24 Jun 2023 03:15  Phos  6.6     06-24  Mg     2.4     06-24    TPro  5.1<L>  /  Alb  1.7<L>  /  TBili  1.1  /  DBili      /  AST  65<H>  /  ALT  70<H>  /  AlkPhos  74  06-24    Creatinine Trend: 4.76 <--, 4.26 <--, 4.98 <--, 4.52 <--, 5.41 <--, 4.79 <--, 4.70 <--, 3.90 <--, 3.88 <--, 3.65 <--, 3.47 <--, 3.39 <--, 3.55 <--, 3.25 <--, 3.16 <--, 2.87 <--, 2.59 <--, 2.60 <--, 2.54 <--                        9.4    13.62 )-----------( 92       ( 24 Jun 2023 03:15 )             28.2     Urine Studies:  Urinalysis Basic - ( 24 Jun 2023 03:15 )    Color:  / Appearance:  / SG:  / pH:   Gluc: 204 mg/dL / Ketone:   / Bili:  / Urobili:    Blood:  / Protein:  / Nitrite:    Leuk Esterase:  / RBC:  / WBC    Sq Epi:  / Non Sq Epi:  / Bacteria:       Sodium, Random Urine: 58 mmol/L (06-19 @ 09:50)  Potassium, Random Urine: 54 mmol/L (06-19 @ 09:50)  Creatinine, Random Urine: 76 mg/dL (06-19 @ 09:50)

## 2023-06-24 NOTE — PHARMACOTHERAPY INTERVENTION NOTE - COMMENTS
Recommended changing frequency of meropenem from Q12hrs to Q24hrs due to patient's current renal function.
On iHD, decrease meropenem to 500 Q24H

## 2023-06-24 NOTE — CONSULT NOTE ADULT - ASSESSMENT
To be completed.      Acute eyes-closed coma, multifactorial etiology (see the next three diagnoses).      Severe malnutrition.    Acute renal failure; now dialysis-dependent.      Hepatic enzyme elevations.    Vent dependent (overrides).      RECOMMENDATIONS    B12, folate, methylmalonic acid, homocysteine, ESR, CRP, TSH, FT4, RPR, ammonia, Fe, TIBC, ferritin.      Jordan Mancera M.D.   - Department of Neurology  [place] [facility] [employer] [facility]   Acute eyes-closed coma, multifactorial etiology (see the next four diagnoses).      Severe malnutrition.    Acute renal failure; now dialysis-dependent.      Hepatic enzyme elevations.    Sepsis.     Vent dependent (overrides).      RECOMMENDATIONS    B12, folate, methylmalonic acid, homocysteine, ESR, CRP, TSH, FT4, RPR, ammonia, Fe, TIBC, ferritin.    The assay for B12 does not measure B12 level directly.  False normal and false elevations (to or above the normal range) occur with pernicious anemia due to intrinsic factor antibodies, which may or may not be detected by antibody tests.  B12-like compounds of vegetable origin (a problem with vegans) without cobalamin activity also can lead to falsely normal or elevated determinations.  Methylmalonic acid (MMA) and homocysteine (Hcy) determinations are necessary to elucidate what is happening.    Irrespective of B12 and folate levels:       high Hcy w normal MMA implies folate deficiency;        high MMA and Hcy implies B12 deficiency +/- folate deficiency.        Jordan Mancera M.D.   - Department of Neurology  [place] [facility] [employer] [facility]   Acute eyes-closed coma, multifactorial etiology (see the next four diagnoses).      Severe malnutrition (serum albumin 1.7).    Acute renal failure; now dialysis-dependent.      Hepatic enzyme elevations.    Sepsis.     Vent dependent (overrides).    This is not the picture of non-convulsive status.    He may have suffered an episode of cerebral hypoxia and/or cerebral hypoperfusion.      R/O other factors that could contribute to encephalopathy.         RECOMMENDATIONS    B12, folate, methylmalonic acid, homocysteine, ESR, CRP, TSH, FT4, RPR, ammonia, Fe, TIBC, ferritin.    The assay for B12 does not measure B12 level directly.  False normal and false elevations (to or above the normal range) occur with pernicious anemia due to intrinsic factor antibodies, which may or may not be detected by antibody tests.  B12-like compounds of vegetable origin (a problem with vegans) without cobalamin activity also can lead to falsely normal or elevated determinations.  Methylmalonic acid (MMA) and homocysteine (Hcy) determinations are necessary to elucidate what is happening.    Irrespective of B12 and folate levels:       high Hcy w normal MMA implies folate deficiency;        high MMA and Hcy implies B12 deficiency +/- folate deficiency.        Jordan Mancera M.D.   - Department of Neurology  [place] [facility] [employer] [facility]

## 2023-06-24 NOTE — PROGRESS NOTE ADULT - SUBJECTIVE AND OBJECTIVE BOX
77y Male is under our care for septic shock, peritonitis, leukocytosis, and resp failure. Patient remains in same disposition and is still vent dependent (fio2- 40%, peep-8) and on 2 pressors. has been having low grade temps ranging 99-100Fs. Family is at bedside.     MEDS:  meropenem  IVPB 500 milliGRAM(s) IV Intermittent every 24 hours    ALLERGIES: Allergies    Bactrim (Hives; Rash)    Intolerances    REVIEW OF SYSTEMS:  [ X ] Not able to elicit  General:	  Chest:	  GI:	  :  Skin:	  Musculoskeletal:	  Neuro:	    VITALS:  Vital Signs Last 24 Hrs  T(C): 37.1 (24 Jun 2023 13:00), Max: 37.8 (23 Jun 2023 19:15)  T(F): 98.8 (24 Jun 2023 13:00), Max: 100 (23 Jun 2023 19:15)  HR: 72 (24 Jun 2023 13:00) (68 - 104)  BP: 149/59 (24 Jun 2023 04:00) (89/45 - 154/57)  BP(mean): 83 (24 Jun 2023 04:00) (58 - 86)  RR: 25 (24 Jun 2023 13:00) (10 - 41)  SpO2: 100% (24 Jun 2023 13:00) (87% - 100%)    Parameters below as of 23 Jun 2023 20:00  Patient On (Oxygen Delivery Method): ventilator    O2 Concentration (%): 40    PHYSICAL EXAM:  HEENT: +salem sump +trach via ETT   Neck: supple no LN's, left neck CVP intact  Respiratory: bilateral rhoncherous lung sounds, no rales  Cardiovascular: S1 S2 reg no murmurs  Gastrointestinal: +rare BS with firm, moderately distended abdomen; nontender +ileostomy +drain cath  : +langley  Extremities: mild bilateral hand edema  Skin: no rashes  Ortho: n/a  Neuro: AAO x 3    LABS/DIAGNOSTIC TESTS:                        9.4    13.62 )-----------( 92       ( 24 Jun 2023 03:15 )             28.2     WBC Count: 13.62 K/uL (06-24 @ 03:15)  WBC Count: 13.96 K/uL (06-23 @ 17:40)  WBC Count: 9.38 K/uL (06-23 @ 03:48)  WBC Count: 9.65 K/uL (06-22 @ 19:53)  WBC Count: 11.69 K/uL (06-22 @ 03:50)    06-24    141  |  104  |  99<H>  ----------------------------<  204<H>  4.9   |  24  |  4.76<H> 4.26 < 4.98    Ca    7.6<L>      24 Jun 2023 03:15  Phos  6.6     06-24  Mg     2.4     06-24    TPro  5.1<L>  /  Alb  1.7<L>  /  TBili  1.1  /  DBili  x   /  AST  65<H>  /  ALT  70<H>  /  AlkPhos  74  06-24    Lactate, Blood: 2.8 mmol/L (06-23 @ 17:40)    CULTURES:   .Blood Blood-Peripheral  06-18 @ 16:35   No Growth Final  --  --      .Blood Blood-Peripheral  06-18 @ 16:30   No Growth Final  --  --      Clean Catch Clean Catch (Midstream)  06-17 @ 04:52   10,000 - 49,000 CFU/mL Enterococcus faecalis  <10,000 CFU/ml Normal Urogenital aure present  --  Enterococcus faecalis        RADIOLOGY:   ACC: [id number] EXAM:  US DPLX LWR EXT VEINS COMPL BI   ORDERED BY:   LOPEZ DEVRIES     PROCEDURE DATE:  06/23/2023        INTERPRETATION:  CLINICAL INFORMATION: DVT on point point to the   ultrasound performed in the emergency department.  Septic shock.    COMPARISON: None available.    TECHNIQUE: Duplex sonography of the BILATERAL LOWER extremity veins with   color and spectral Doppler, with and without compression.    FINDINGS:    RIGHT:  Normal compressibility of the RIGHT common femoral, femoral and popliteal   veins.  Doppler examination shows normal spontaneous and phasic flow.  No RIGHT calf vein thrombosis is detected.    LEFT:  The LEFT greater saphenous vein (superficial vein) is thrombosed from the   groin to the knee.  Normal compressibility of the LEFT common femoral, femoral and popliteal   veins.  Doppler examination shows normal spontaneous and phasic flow.  No LEFT calf vein thrombosis is detected.    IMPRESSION:  Long segment superficial venous thrombosis in the left greater saphenous   vein, extending from the groin to the knee.    No evidence of deep venous thrombosis in either lower extremity. 77y Male is under our care for septic shock, peritonitis, leukocytosis, and resp failure. Patient remains in same disposition and is still vent dependent (fio2- 40%, peep-8) and on 2 pressors. has been having low grade temps ranging 99-100Fs. Family is at bedside.     MEDS:  meropenem  IVPB 500 milliGRAM(s) IV Intermittent every 24 hours    ALLERGIES: Allergies    Bactrim (Hives; Rash)    Intolerances    REVIEW OF SYSTEMS:  [ X ] Not able to elicit  General:	  Chest:	  GI:	  :  Skin:	  Musculoskeletal:	  Neuro:	    VITALS:  Vital Signs Last 24 Hrs  T(C): 37.1 (24 Jun 2023 13:00), Max: 37.8 (23 Jun 2023 19:15)  T(F): 98.8 (24 Jun 2023 13:00), Max: 100 (23 Jun 2023 19:15)  HR: 72 (24 Jun 2023 13:00) (68 - 104)  BP: 149/59 (24 Jun 2023 04:00) (89/45 - 154/57)  BP(mean): 83 (24 Jun 2023 04:00) (58 - 86)  RR: 25 (24 Jun 2023 13:00) (10 - 41)  SpO2: 100% (24 Jun 2023 13:00) (87% - 100%)    Parameters below as of 23 Jun 2023 20:00  Patient On (Oxygen Delivery Method): ventilator    O2 Concentration (%): 40    PHYSICAL EXAM:  HEENT: +salem sump +trach via ETT   Neck: supple no LN's, left neck CVP intact  Respiratory: bilateral rhoncherous lung sounds, no rales  Cardiovascular: S1 S2 reg no murmurs  Gastrointestinal: +rare BS with firm, moderately distended abdomen; nontender +ileostomy +drain cath  : +langley  Extremities: mild bilateral hand edema  Skin: no rashes  Ortho: n/a  Neuro: AAO x 0    LABS/DIAGNOSTIC TESTS:                        9.4    13.62 )-----------( 92       ( 24 Jun 2023 03:15 )             28.2     WBC Count: 13.62 K/uL (06-24 @ 03:15)  WBC Count: 13.96 K/uL (06-23 @ 17:40)  WBC Count: 9.38 K/uL (06-23 @ 03:48)  WBC Count: 9.65 K/uL (06-22 @ 19:53)  WBC Count: 11.69 K/uL (06-22 @ 03:50)    06-24    141  |  104  |  99<H>  ----------------------------<  204<H>  4.9   |  24  |  4.76<H> 4.26 < 4.98    Ca    7.6<L>      24 Jun 2023 03:15  Phos  6.6     06-24  Mg     2.4     06-24    TPro  5.1<L>  /  Alb  1.7<L>  /  TBili  1.1  /  DBili  x   /  AST  65<H>  /  ALT  70<H>  /  AlkPhos  74  06-24    Lactate, Blood: 2.8 mmol/L (06-23 @ 17:40)    CULTURES:   .Blood Blood-Peripheral  06-18 @ 16:35   No Growth Final  --  --      .Blood Blood-Peripheral  06-18 @ 16:30   No Growth Final  --  --      Clean Catch Clean Catch (Midstream)  06-17 @ 04:52   10,000 - 49,000 CFU/mL Enterococcus faecalis  <10,000 CFU/ml Normal Urogenital aure present  --  Enterococcus faecalis        RADIOLOGY:   ACC: [id number] EXAM:  US DPLX LWR EXT VEINS COMPL BI   ORDERED BY:   LOPEZ DEVRIES     PROCEDURE DATE:  06/23/2023        INTERPRETATION:  CLINICAL INFORMATION: DVT on point point to the   ultrasound performed in the emergency department.  Septic shock.    COMPARISON: None available.    TECHNIQUE: Duplex sonography of the BILATERAL LOWER extremity veins with   color and spectral Doppler, with and without compression.    FINDINGS:    RIGHT:  Normal compressibility of the RIGHT common femoral, femoral and popliteal   veins.  Doppler examination shows normal spontaneous and phasic flow.  No RIGHT calf vein thrombosis is detected.    LEFT:  The LEFT greater saphenous vein (superficial vein) is thrombosed from the   groin to the knee.  Normal compressibility of the LEFT common femoral, femoral and popliteal   veins.  Doppler examination shows normal spontaneous and phasic flow.  No LEFT calf vein thrombosis is detected.    IMPRESSION:  Long segment superficial venous thrombosis in the left greater saphenous   vein, extending from the groin to the knee.    No evidence of deep venous thrombosis in either lower extremity.

## 2023-06-24 NOTE — PROGRESS NOTE ADULT - SUBJECTIVE AND OBJECTIVE BOX
INTERVAL HPI/OVERNIGHT EVENTS:  Seen and examined at bedside. No acute events overnight.    PRESSORS: [x ] YES [ ] NO  WHICH: vaso, and levo     Antimicrobial:  meropenem  IVPB 1000 milliGRAM(s) IV Intermittent every 24 hours    Cardiovascular:  norepinephrine Infusion 0.45 MICROgram(s)/kG/Min IV Continuous <Continuous>    Pulmonary:    Hematalogic:  heparin   Injectable 6500 Unit(s) IV Push every 6 hours PRN  heparin   Injectable 3000 Unit(s) IV Push every 6 hours PRN  heparin  Infusion.  Unit(s)/Hr IV Continuous <Continuous>    Other:  artificial  tears Solution 1 Drop(s) Both EYES four times a day  chlorhexidine 2% Cloths 1 Application(s) Topical <User Schedule>  insulin lispro (ADMELOG) corrective regimen sliding scale   SubCutaneous every 6 hours  mupirocin 2% Ointment 1 Application(s) Both Nostrils two times a day  pantoprazole  Injectable 40 milliGRAM(s) IV Push daily  sodium chloride 0.9% lock flush 10 milliLiter(s) IV Push every 1 hour PRN  vasopressin Infusion 0.04 Unit(s)/Min IV Continuous <Continuous>    artificial  tears Solution 1 Drop(s) Both EYES four times a day  chlorhexidine 2% Cloths 1 Application(s) Topical <User Schedule>  heparin   Injectable 3000 Unit(s) IV Push every 6 hours PRN  heparin   Injectable 6500 Unit(s) IV Push every 6 hours PRN  heparin  Infusion.  Unit(s)/Hr IV Continuous <Continuous>  insulin lispro (ADMELOG) corrective regimen sliding scale   SubCutaneous every 6 hours  meropenem  IVPB 1000 milliGRAM(s) IV Intermittent every 24 hours  mupirocin 2% Ointment 1 Application(s) Both Nostrils two times a day  norepinephrine Infusion 0.45 MICROgram(s)/kG/Min IV Continuous <Continuous>  pantoprazole  Injectable 40 milliGRAM(s) IV Push daily  sodium chloride 0.9% lock flush 10 milliLiter(s) IV Push every 1 hour PRN  vasopressin Infusion 0.04 Unit(s)/Min IV Continuous <Continuous>    Drug Dosing Weight  Height (cm): 170 (17 Jun 2023 01:51)  Weight (kg): 81 (17 Jun 2023 19:13)  BMI (kg/m2): 28 (17 Jun 2023 19:13)  BSA (m2): 1.93 (17 Jun 2023 19:13)    CENTRAL LINE: [x ] YES [ ] NO  LOCATION:   DATE INSERTED:  REMOVE: [ ] YES [ ] NO  EXPLAIN:    [facility]: [ x] YES [ ] NO    DATE INSERTED:  REMOVE:  [ ] YES [ ] NO  EXPLAIN:    A-LINE:  [x ] YES [ ] NO  LOCATION:   DATE INSERTED:  REMOVE:  [ ] YES [ ] NO  EXPLAIN:    PMH -reviewed admission note, no change since admission  PAST MEDICAL & SURGICAL HISTORY:  Hypertension      History of asthma      History of high cholesterol      No significant past surgical history          ICU Vital Signs Last 24 Hrs  T(C): 37.8 (24 Jun 2023 04:45), Max: 37.9 (23 Jun 2023 08:15)  T(F): 100 (24 Jun 2023 04:45), Max: 100.2 (23 Jun 2023 08:15)  HR: 79 (24 Jun 2023 04:45) (73 - 104)  BP: 149/59 (24 Jun 2023 04:00) (89/45 - 154/57)  BP(mean): 83 (24 Jun 2023 04:00) (58 - 86)  ABP: 126/55 (24 Jun 2023 04:45) (88/44 - 146/62)  ABP(mean): 77 (24 Jun 2023 04:45) (55 - 91)  RR: 26 (24 Jun 2023 04:45) (22 - 34)  SpO2: 100% (24 Jun 2023 04:45) (79% - 100%)    O2 Parameters below as of 23 Jun 2023 20:00  Patient On (Oxygen Delivery Method): ventilator    O2 Concentration (%): 40        ABG - ( 24 Jun 2023 03:01 )  pH, Arterial: 7.34  pH, Blood: x     /  pCO2: 43    /  pO2: 107   / HCO3: 23    / Base Excess: -2.5  /  SaO2: 100                   06-22 @ 07:01  -  06-23 @ 07:00  --------------------------------------------------------  IN: 2205.5 mL / OUT: 1515 mL / NET: 690.5 mL        Mode: AC/ CMV (Assist Control/ Continuous Mandatory Ventilation)  RR (machine): 22  TV (machine): 400  FiO2: 40  PEEP: 8  ITime: 0.8  MAP: 16  PIP: 32      PHYSICAL EXAM:  GENERAL: NAD, intubated and sedated this AM   HEAD:  Atraumatic, Normocephalic  EYES: EOMI, PERRLA, (+)  pupils pinpoint 1mm  ENMT: No tonsillar erythema, exudates, or enlargement; Moist mucous membranes, Good dentition, No lesions  NECK: Supple, normal appearance, No JVD; Normal thyroid; Trachea midline  NERVOUS SYSTEM:  No longer on sedation, no withdrawal to pain, neg gag reflex   CHEST/LUNG: No chest deformity; Normal percussion bilaterally; No rales, rhonchi, wheezing   HEART: Regular rate and rhythm; No murmurs, rubs, or gallops  ABDOMEN: Soft, Nontender, distended; Bowel sounds present, (+) ileostomy in place draining 15cc of fluid, surgical scar w/ clean dressing.    EXTREMITIES:  2+ Peripheral Pulses, No clubbing, cyanosis, (+) upper and lower extremity edema, scrotal edema   LYMPH: No lymphadenopathy noted  SKIN: No rashes or lesions;  Good capillary refill      LABS:  CBC Full  -  ( 24 Jun 2023 03:15 )  WBC Count : 13.62 K/uL  RBC Count : 3.23 M/uL  Hemoglobin : 9.4 g/dL  Hematocrit : 28.2 %  Platelet Count - Automated : 92 K/uL  Mean Cell Volume : 87.3 fl  Mean Cell Hemoglobin : 29.1 pg  Mean Cell Hemoglobin Concentration : 33.3 gm/dL  Auto Neutrophil # : 11.85 K/uL  Auto Lymphocyte # : 0.68 K/uL  Auto Monocyte # : 0.14 K/uL  Auto Eosinophil # : 0.00 K/uL  Auto Basophil # : 0.00 K/uL  Auto Neutrophil % : 78.0 %  Auto Lymphocyte % : 5.0 %  Auto Monocyte % : 1.0 %  Auto Eosinophil % : 0.0 %  Auto Basophil % : 0.0 %    06-24    141  |  104  |  99<H>  ----------------------------<  204<H>  4.9   |  24  |  4.76<H>    Ca    7.6<L>      24 Jun 2023 03:15  Phos  6.6     06-24  Mg     2.4     06-24    TPro  5.1<L>  /  Alb  1.7<L>  /  TBili  1.1  /  DBili  x   /  AST  65<H>  /  ALT  70<H>  /  AlkPhos  74  06-24    PT/INR - ( 24 Jun 2023 03:15 )   PT: 20.2 sec;   INR: 1.69 ratio         PTT - ( 23 Jun 2023 20:00 )  PTT:31.4 sec  Urinalysis Basic - ( 24 Jun 2023 03:15 )    Color: x / Appearance: x / SG: x / pH: x  Gluc: 204 mg/dL / Ketone: x  / Bili: x / Urobili: x   Blood: x / Protein: x / Nitrite: x   Leuk Esterase: x / RBC: x / WBC x   Sq Epi: x / Non Sq Epi: x / Bacteria: x          RADIOLOGY & ADDITIONAL STUDIES REVIEWED:  ***    [ ]GOALS OF CARE DISCUSSION WITH PATIENT/FAMILY/PROXY:    CRITICAL CARE TIME SPENT: 35 minutes INTERVAL HPI/OVERNIGHT EVENTS:  Seen and examined at bedside. No acute events overnight. Pt undergoing HD today (3rd session). Spot EEG ordered. NGT to suction, suctioned out 900cc of bilious drainage. CT ab/pelvis from yesterday showed evidence of persistent obstruction, atretic ileostomy site, possible concern for ischemic bowel, shiley in the hepatic IVC, and langley in the prostate. Langley re-positioned, pending bladder scan. Shiley pulled out 2cm yesterday, will pull out 3 more cm today.     PRESSORS: [x ] YES [ ] NO  WHICH: vaso, and levo     Antimicrobial:  meropenem  IVPB 1000 milliGRAM(s) IV Intermittent every 24 hours    Cardiovascular:  norepinephrine Infusion 0.45 MICROgram(s)/kG/Min IV Continuous <Continuous>    Pulmonary:    Hematalogic:  heparin   Injectable 6500 Unit(s) IV Push every 6 hours PRN  heparin   Injectable 3000 Unit(s) IV Push every 6 hours PRN  heparin  Infusion.  Unit(s)/Hr IV Continuous <Continuous>    Other:  artificial  tears Solution 1 Drop(s) Both EYES four times a day  chlorhexidine 2% Cloths 1 Application(s) Topical <User Schedule>  insulin lispro (ADMELOG) corrective regimen sliding scale   SubCutaneous every 6 hours  mupirocin 2% Ointment 1 Application(s) Both Nostrils two times a day  pantoprazole  Injectable 40 milliGRAM(s) IV Push daily  sodium chloride 0.9% lock flush 10 milliLiter(s) IV Push every 1 hour PRN  vasopressin Infusion 0.04 Unit(s)/Min IV Continuous <Continuous>    artificial  tears Solution 1 Drop(s) Both EYES four times a day  chlorhexidine 2% Cloths 1 Application(s) Topical <User Schedule>  heparin   Injectable 3000 Unit(s) IV Push every 6 hours PRN  heparin   Injectable 6500 Unit(s) IV Push every 6 hours PRN  heparin  Infusion.  Unit(s)/Hr IV Continuous <Continuous>  insulin lispro (ADMELOG) corrective regimen sliding scale   SubCutaneous every 6 hours  meropenem  IVPB 1000 milliGRAM(s) IV Intermittent every 24 hours  mupirocin 2% Ointment 1 Application(s) Both Nostrils two times a day  norepinephrine Infusion 0.45 MICROgram(s)/kG/Min IV Continuous <Continuous>  pantoprazole  Injectable 40 milliGRAM(s) IV Push daily  sodium chloride 0.9% lock flush 10 milliLiter(s) IV Push every 1 hour PRN  vasopressin Infusion 0.04 Unit(s)/Min IV Continuous <Continuous>    Drug Dosing Weight  Height (cm): 170 (17 Jun 2023 01:51)  Weight (kg): 81 (17 Jun 2023 19:13)  BMI (kg/m2): 28 (17 Jun 2023 19:13)  BSA (m2): 1.93 (17 Jun 2023 19:13)    CENTRAL LINE: [x ] YES [ ] NO  LOCATION:   DATE INSERTED:  REMOVE: [ ] YES [ ] NO  EXPLAIN:    [facility]: [ x] YES [ ] NO    DATE INSERTED:  REMOVE:  [ ] YES [ ] NO  EXPLAIN:    A-LINE:  [x ] YES [ ] NO  LOCATION:   DATE INSERTED:  REMOVE:  [ ] YES [ ] NO  EXPLAIN:    PMH -reviewed admission note, no change since admission  PAST MEDICAL & SURGICAL HISTORY:  Hypertension      History of asthma      History of high cholesterol      No significant past surgical history          ICU Vital Signs Last 24 Hrs  T(C): 37.8 (24 Jun 2023 04:45), Max: 37.9 (23 Jun 2023 08:15)  T(F): 100 (24 Jun 2023 04:45), Max: 100.2 (23 Jun 2023 08:15)  HR: 79 (24 Jun 2023 04:45) (73 - 104)  BP: 149/59 (24 Jun 2023 04:00) (89/45 - 154/57)  BP(mean): 83 (24 Jun 2023 04:00) (58 - 86)  ABP: 126/55 (24 Jun 2023 04:45) (88/44 - 146/62)  ABP(mean): 77 (24 Jun 2023 04:45) (55 - 91)  RR: 26 (24 Jun 2023 04:45) (22 - 34)  SpO2: 100% (24 Jun 2023 04:45) (79% - 100%)    O2 Parameters below as of 23 Jun 2023 20:00  Patient On (Oxygen Delivery Method): ventilator    O2 Concentration (%): 40        ABG - ( 24 Jun 2023 03:01 )  pH, Arterial: 7.34  pH, Blood: x     /  pCO2: 43    /  pO2: 107   / HCO3: 23    / Base Excess: -2.5  /  SaO2: 100                   06-22 @ 07:01  -  06-23 @ 07:00  --------------------------------------------------------  IN: 2205.5 mL / OUT: 1515 mL / NET: 690.5 mL        Mode: AC/ CMV (Assist Control/ Continuous Mandatory Ventilation)  RR (machine): 22  TV (machine): 400  FiO2: 40  PEEP: 8  ITime: 0.8  MAP: 16  PIP: 32      PHYSICAL EXAM:  GENERAL: NAD, intubated , not sedated   HEAD:  Atraumatic, Normocephalic  EYES: EOMI, PERRLA,  pupils 2mm  ENMT: No tonsillar erythema, exudates, or enlargement; Moist mucous membranes, Good dentition, No lesions  NECK: Supple, normal appearance, No JVD; Normal thyroid; Trachea midline  NERVOUS SYSTEM:  No longer on sedation, no withdrawal to pain, neg gag reflex   CHEST/LUNG: No chest deformity; Normal percussion bilaterally; No rales, rhonchi, wheezing   HEART: Regular rate and rhythm; No murmurs, rubs, or gallops  ABDOMEN: Soft, Nontender, distended; Bowel sounds present, (+) ileostomy in place draining 15cc of fluid, surgical scar w/ clean dressing.    EXTREMITIES:  2+ Peripheral Pulses, No clubbing, cyanosis, (+) upper and lower extremity edema, scrotal edema   LYMPH: No lymphadenopathy noted  SKIN: No rashes or lesions;  Good capillary refill      LABS:  CBC Full  -  ( 24 Jun 2023 03:15 )  WBC Count : 13.62 K/uL  RBC Count : 3.23 M/uL  Hemoglobin : 9.4 g/dL  Hematocrit : 28.2 %  Platelet Count - Automated : 92 K/uL  Mean Cell Volume : 87.3 fl  Mean Cell Hemoglobin : 29.1 pg  Mean Cell Hemoglobin Concentration : 33.3 gm/dL  Auto Neutrophil # : 11.85 K/uL  Auto Lymphocyte # : 0.68 K/uL  Auto Monocyte # : 0.14 K/uL  Auto Eosinophil # : 0.00 K/uL  Auto Basophil # : 0.00 K/uL  Auto Neutrophil % : 78.0 %  Auto Lymphocyte % : 5.0 %  Auto Monocyte % : 1.0 %  Auto Eosinophil % : 0.0 %  Auto Basophil % : 0.0 %    06-24    141  |  104  |  99<H>  ----------------------------<  204<H>  4.9   |  24  |  4.76<H>    Ca    7.6<L>      24 Jun 2023 03:15  Phos  6.6     06-24  Mg     2.4     06-24    TPro  5.1<L>  /  Alb  1.7<L>  /  TBili  1.1  /  DBili  x   /  AST  65<H>  /  ALT  70<H>  /  AlkPhos  74  06-24    PT/INR - ( 24 Jun 2023 03:15 )   PT: 20.2 sec;   INR: 1.69 ratio         PTT - ( 23 Jun 2023 20:00 )  PTT:31.4 sec  Urinalysis Basic - ( 24 Jun 2023 03:15 )    Color: x / Appearance: x / SG: x / pH: x  Gluc: 204 mg/dL / Ketone: x  / Bili: x / Urobili: x   Blood: x / Protein: x / Nitrite: x   Leuk Esterase: x / RBC: x / WBC x   Sq Epi: x / Non Sq Epi: x / Bacteria: x          RADIOLOGY & ADDITIONAL STUDIES REVIEWED:  ***    [ ]GOALS OF CARE DISCUSSION WITH PATIENT/FAMILY/PROXY:    CRITICAL CARE TIME SPENT: 35 minutes

## 2023-06-24 NOTE — PROGRESS NOTE ADULT - SUBJECTIVE AND OBJECTIVE BOX
INTERVAL HPI/OVERNIGHT EVENTS:  Seen and examined   Intubated, sedated    MEDICATIONS  (STANDING):  artificial  tears Solution 1 Drop(s) Both EYES four times a day  chlorhexidine 2% Cloths 1 Application(s) Topical <User Schedule>  heparin  Infusion.  Unit(s)/Hr (15 mL/Hr) IV Continuous <Continuous>  insulin lispro (ADMELOG) corrective regimen sliding scale   SubCutaneous every 6 hours  meropenem  IVPB 500 milliGRAM(s) IV Intermittent every 24 hours  norepinephrine Infusion 0.45 MICROgram(s)/kG/Min (34.2 mL/Hr) IV Continuous <Continuous>  pantoprazole  Injectable 40 milliGRAM(s) IV Push daily  vasopressin Infusion 0.04 Unit(s)/Min (6 mL/Hr) IV Continuous <Continuous>    MEDICATIONS  (PRN):  heparin   Injectable 3000 Unit(s) IV Push every 6 hours PRN For aPTT between 40 - 57  heparin   Injectable 6500 Unit(s) IV Push every 6 hours PRN For aPTT less than 40  sodium chloride 0.9% lock flush 10 milliLiter(s) IV Push every 1 hour PRN Pre/post blood products, medications, blood draw, and to maintain line patency      Vital Signs Last 24 Hrs  T(C): 37.2 (24 Jun 2023 10:45), Max: 37.8 (23 Jun 2023 19:15)  T(F): 99 (24 Jun 2023 10:45), Max: 100 (23 Jun 2023 19:15)  HR: 88 (24 Jun 2023 10:45) (76 - 104)  BP: 149/59 (24 Jun 2023 04:00) (89/45 - 154/57)  BP(mean): 83 (24 Jun 2023 04:00) (58 - 86)  RR: 22 (24 Jun 2023 10:45) (10 - 41)  SpO2: 99% (24 Jun 2023 10:45) (79% - 100%)    Parameters below as of 23 Jun 2023 20:00  Patient On (Oxygen Delivery Method): ventilator    O2 Concentration (%): 40    Physical:  General: Intubated, sedated   Skin: No jaundice, no icterus  Abdomen: Midline staples intact. Nonerythematous. Packing changed at bedside with gauze/tape dressing. No active bleeding or drainage noted. Ostomy pink/viable, red rubber in ostomy, no air or stool in bag   : Normal external genitalia      I&O's Detail    23 Jun 2023 07:01  -  24 Jun 2023 07:00  --------------------------------------------------------  IN:    Heparin Infusion: 169 mL    IV PiggyBack: 50 mL    Norepinephrine: 677.9 mL    Other (mL): 600 mL    Vasopressin: 108 mL  Total IN: 1604.9 mL    OUT:    Ileostomy (mL): 60 mL    Indwelling Catheter - Urethral (mL): 120 mL    Other (mL): 1600 mL  Total OUT: 1780 mL    Total NET: -175.1 mL          LABS:                        9.4    13.62 )-----------( 92       ( 24 Jun 2023 03:15 )             28.2             06-24    141  |  104  |  99<H>  ----------------------------<  204<H>  4.9   |  24  |  4.76<H>    Ca    7.6<L>      24 Jun 2023 03:15  Phos  6.6     06-24  Mg     2.4     06-24    TPro  5.1<L>  /  Alb  1.7<L>  /  TBili  1.1  /  DBili  x   /  AST  65<H>  /  ALT  70<H>  /  AlkPhos  74  06-24

## 2023-06-24 NOTE — CHART NOTE - NSCHARTNOTEFT_GEN_A_CORE
ICU asked for urology f/u for CT showing langley balloon in prostate  balloon deflated yesterday by nursing and langley advanced  300 cc UO in bag since yesterday  60cc since morning shift    pt currently on HD    per ICU formal bladder scan inconclusive    < from: US Urinary Bladder (06.24.23 @ 12:12) >    FINDINGS: The bladder is not visualized and therefore position of the   indwelling Langley catheter cannot be assessed.      < end of copied text >    discussed with Dr Curiel urology  recommended flush langley cath to assess for return  langley flushed easily and all irrigant returned on withdrawal  d/w Dr Curiel, no acute urology intervention

## 2023-06-24 NOTE — PROGRESS NOTE ADULT - ASSESSMENT
78 y/o male with PMHx of HTN , HLD, Asthma, no prior abdominal surgeries, complains of severe generalized abdominal pain and vomiting since about 8 PM.  Pain is described as sharp , persistent in the epigastric area with multiple > 4NBNB vomiting . Last BM was yesterday morning with persistent flatus. Mild leucocytosis, dehydrated , lactate is 2.1 . CT shows SBO. Pt was taken to the OR for diagnostic laproscopy which was converted to exploratory lap with ileostomy due to spillage. ICU was consulted as pt was requiring pressor support during and post surgery.     #SBO w/ abdominal perforation, spillage of gastric contents   #AHRF 2/2 PNA, ARDS  #Pankaj vs PANKAJ on CKD  #HTN  #HLD  #Asthma      Plan:  Neuro:  Currently intubated, no longer sedated, still not waking up, no purposeful movements or withdrawal to pain  Baseline AOx3  f/u CTH to evaluate for hemorrhage, stroke   will hold on heparin gtt until imaging done     Cardiovascular:  #Shock - Septic Shock 2/2 to SBO complicated by abdominal perforation with spillage of gastric contents during surgery  Currently requiring pressor support with levo and vaso  Will titrate down as tolerated  Fluid status- worsening urine output,  edema on physical exam   started on HD 6/22, 6/23, and 6/25  Removing 1 L 6/23    #AFib  afib w/ rate to 120-130 initially, improved  continue to monitor     #HTN   Pt has history of HTN on amlodipine.  Will hold in the setting of shock.     Pulmonary:   #Acute Hypoxic Respiratory Failure 2/2 to fluid overload vs. pneumonia vs. ARDS  POCUS exam 6/19- diffuse B-lines, signs of retrocardiac consolidations    P/F ratio: <300, mild-mod hypoxemia, indicative of ARDS   pt. on FiO2 40%, PEEP 8 today, will keep vented   pt on meropenem, will cover PNA   SAT/SBT    Infectious Diseases:  #Septic Shock 2/2 to SBO complicated by abdominal perforation with spillage of gastric contents during surgery  s/p exploratory laproscopy  s/p ileostomy   will keep npo   NGT in place, stopped suction, started tube feeds 6/22  TF held today  Temp spiking since this AM, possible loculation of infection in abdomen   f/u CT abdomen pelvis w/ IV and PO contrast to assess for infection  surgery aware  c/w meropenem      Gastrointestinal:  #SBO  s/p exploratory laproscopy.   s/p ileostomy   will keep npo   NGT in place, feeds held   c/w meropenem    #Transaminitis   slighty uptrend in LFTs  likely in the setting of sepsis    Renal:  #ATN due to sepsis   Pt w/ SCr 2.08 on admission  Baseline SCr - Unknown  Scr 5.4, up-trending   Urine lytes- show intrinsic picture, likely ATN   HD 6/23  hold IVF due to risk of fluid overload  follow BMP daily    #Metabolic Acidosis  ABG 7.34/49/86/26 this AM   Metabolic acidosis likely due to septic shock, lactic acidosis   Acidosis improving after adjustment of the vent settings  Nephro Dr. Brown      #Hyperkalemia   Potassium 5.7 > 6.2 > 5.5 > 5.4 > 4.4   resolving     Heme/onc:  #Thrombocytopenia possibly DIC   - Plts downtrending, ~53 today (came in with 200s)  - pt never received heparin products   - possibly in the setting of infection, ITP, DIC   - fibrinogen 902> 116 (lab error) > 810, >362> 353, hapto 219  - platelet count blue top- 58    Endo:   #HLD  Pt has history of HLD  On Rosuvastatin 10 at home   Will continue as Atorvastatin 40 when given clearance by Surgery to advance diet.     Skin/ catheter: -   Fem central Line d/c'd  LIJ central line 6/20  [place] - 6/17-6/18  R Ax [place] - 6/18  Caude langley - 6/17    Prophylaxis:   DVT: SCD - Advance to chemical as per surgery.   GI: PPI     Goals of Care: Full Code    Dispo: Will continue to monitor in ICU.    78 y/o male with PMHx of HTN , HLD, Asthma, no prior abdominal surgeries, complains of severe generalized abdominal pain and vomiting since about 8 PM.  Pain is described as sharp , persistent in the epigastric area with multiple > 4NBNB vomiting . Last BM was yesterday morning with persistent flatus. Mild leucocytosis, dehydrated , lactate is 2.1 . CT shows SBO. Pt was taken to the OR for diagnostic laproscopy which was converted to exploratory lap with ileostomy due to spillage. ICU was consulted as pt was requiring pressor support during and post surgery.     #SBO w/ abdominal perforation, spillage of gastric contents   #AHRF 2/2 PNA, ARDS  #Pankaj vs PANKAJ on CKD  #HTN  #HLD  #Asthma      Plan:  Neuro:  Currently intubated, no longer sedated, still not waking up, no purposeful movements or withdrawal to pain  Baseline AOx3  CTH neg for hemorrhage, stroke   f/u spot EEG  Dr. oconnell consulted   started on heparin gtt     Cardiovascular:  #Shock - Septic Shock 2/2 to SBO complicated by abdominal perforation with spillage of gastric contents during surgery  Currently requiring pressor support with levo and vaso  Will titrate down as tolerated  Fluid status- worsening urine output,  edema on physical exam    HD 6/22, 6/23, and 6/24  Removing 1 L 6/23, removing 1.5 L today     #AFib  afib w/ rate to 120-130 initially, improved  continue to monitor     #HTN   Pt has history of HTN on amlodipine.  Will hold in the setting of shock.     Pulmonary:   #Acute Hypoxic Respiratory Failure 2/2 to fluid overload vs. pneumonia vs. ARDS  POCUS exam 6/19- diffuse B-lines, signs of retrocardiac consolidations    P/F ratio: <300, mild-mod hypoxemia, indicative of ARDS   pt. on FiO2 40%, PEEP 8 today, will keep vented due to lack of mental status   pt on meropenem, will cover PNA   SAT/SBT    Infectious Diseases:  #Septic Shock 2/2 to SBO complicated by abdominal perforation with spillage of gastric contents during surgery  s/p exploratory laproscopy  s/p ileostomy - minimal fecal content in ileostomy   NGT in place, started suction 6/24, tube feeds stopped 6/23  Temp spiking on 6/23, no recent fever spikes   CT abdomen pelvis w/ IV and PO contrast - limited oral contrast study since contrast remained in the abdomen.   surgery aware, set NGT to suction - drained 900cc   no plans for re-visit to OR  c/w meropenem      Gastrointestinal:  #SBO  s/p exploratory laproscopy.   s/p ileostomy   will keep npo   NGT in place, started suction 6/24, tube feeds stopped 6/23  Temp spiking on 6/23, no recent fever spikes   CT abdomen pelvis w/ IV and PO contrast - limited oral contrast study since contrast remained in the abdomen.   surgery aware, set NGT to suction - drained 900cc   no plans for re-visit to OR  c/w meropenem      #Transaminitis   slighty uptrend in LFTs  likely in the setting of sepsis    Renal:  #ATN due to sepsis   Pt w/ SCr 2.08 on admission  Baseline SCr - Unknown  Scr 4/76, slight downtrend after 2 HD sessions , BUN downtrending as well   Urine lytes- show intrinsic picture, likely ATN   HD 6/22, 6/23, 6/24   hold IVF due to risk of fluid overload  follow BMP daily    #Metabolic Acidosis  ABG 7.34/43/107/23 this AM   Metabolic acidosis likely due to septic shock, lactic acidosis   Acidosis improving after adjustment of the vent settings  Nephro Dr. Brown      #Hyperkalemia   Potassium 5.7 > 6.2 > 5.5 > 5.4 > 4.4   resolving     Heme/onc:  #Thrombocytopenia possibly DIC   - Plts downtrending, 92 today (came in with 200s), improving   - pt never received heparin products   - possibly in the setting of infection, ITP, DIC   - fibrinogen 902> 116 (lab error) > 810, >362> 353, hapto 219  - platelet count blue top- 58    Endo:   #HLD  Pt has history of HLD  On Rosuvastatin 10 at home   Will continue as Atorvastatin 40 when given clearance by Surgery to advance diet.     Skin/ catheter: -   Fem central Line d/c'd  GLEN waters 6/22   LIJ central line 6/20  [place] - 6/17-6/18  R Ax lori - 6/18  Caude langley - 6/17    Prophylaxis:   DVT: SCD - Advance to chemical as per surgery.   GI: PPI     Goals of Care: Full Code    Dispo: Will continue to monitor in ICU.    78 y/o male with PMHx of HTN , HLD, Asthma, no prior abdominal surgeries, complains of severe generalized abdominal pain and vomiting since about 8 PM.  Pain is described as sharp , persistent in the epigastric area with multiple > 4NBNB vomiting . Last BM was yesterday morning with persistent flatus. Mild leucocytosis, dehydrated , lactate is 2.1 . CT shows SBO. Pt was taken to the OR for diagnostic laproscopy which was converted to exploratory lap with ileostomy due to spillage. ICU was consulted as pt was requiring pressor support during and post surgery.     #SBO w/ abdominal perforation, spillage of gastric contents   #AHRF 2/2 PNA, ARDS  #Pankaj vs PANKAJ on CKD  #HTN  #HLD  #Asthma      Plan:  Neuro:  Currently intubated, no longer sedated, still not waking up, no purposeful movements or withdrawal to pain  CTH non-con negative for stroke/hemorrhage, possible uremic encephalopathy (BUN improving), metabolic encephalopath  Baseline AOx3  f/u spot EEG to assess for non-convulsive status   Dr. oconnell consulted   started on heparin gtt     Cardiovascular:  #Shock - Septic Shock 2/2 to SBO complicated by abdominal perforation with spillage of gastric contents during surgery  Currently requiring pressor support with levo and vaso  Will titrate down as tolerated  Fluid status- worsening urine output,  edema on physical exam    HD 6/22, 6/23, and 6/24  Removing 1 L 6/23, removing 1.5 L today     #AFib  afib w/ rate to 120-130 initially, improved  continue to monitor     #HTN   Pt has history of HTN on amlodipine.  Will hold in the setting of shock.     Pulmonary:   #Acute Hypoxic Respiratory Failure 2/2 to fluid overload vs. pneumonia vs. ARDS  POCUS exam 6/19- diffuse B-lines, signs of retrocardiac consolidations    P/F ratio: <300, mild-mod hypoxemia, indicative of ARDS   pt. on FiO2 40%, PEEP 8 today, will keep vented due to lack of mental status   pt on meropenem, will cover PNA   SAT/SBT    Infectious Diseases:  #Septic Shock 2/2 to SBO complicated by abdominal perforation with spillage of gastric contents during surgery  s/p exploratory laproscopy  s/p ileostomy - minimal fecal content in ileostomy   NGT in place, started suction 6/24, tube feeds stopped 6/23  Temp spiking on 6/23, no recent fever spikes   CT abdomen pelvis w/ IV and PO contrast - limited oral contrast study since contrast remained in the abdomen.   surgery aware, set NGT to suction - drained 900cc   no plans for re-visit to OR  c/w meropenem      Gastrointestinal:  #SBO  s/p exploratory laproscopy.   s/p ileostomy   will keep npo   NGT in place, started suction 6/24, tube feeds stopped 6/23  Temp spiking on 6/23, no recent fever spikes   CT abdomen pelvis w/ IV and PO contrast - limited oral contrast study since contrast remained in the abdomen.   surgery aware, set NGT to suction - drained 900cc   no plans for re-visit to OR  c/w meropenem      #Transaminitis   slighty uptrend in LFTs  likely in the setting of sepsis    Renal:  #ATN due to sepsis   Pt w/ SCr 2.08 on admission  Baseline SCr - Unknown  Scr 4/76, slight downtrend after 2 HD sessions , BUN downtrending as well   Urine lytes- show intrinsic picture, likely ATN   HD 6/22, 6/23, 6/24   hold IVF due to risk of fluid overload  follow BMP daily    #Metabolic Acidosis  ABG 7.34/43/107/23 this AM   Metabolic acidosis likely due to septic shock, lactic acidosis   Acidosis improving after adjustment of the vent settings  Nephro Dr. Brown      #Hyperkalemia   Potassium 5.7 > 6.2 > 5.5 > 5.4 > 4.4   resolving     Heme/onc:  #Thrombocytopenia possibly DIC   - Plts downtrending, 92 today (came in with 200s), improving   - possibly in the setting of infection, ITP, DIC   - fibrinogen 902> 116 (lab error) > 810, >362> 353, hapto 219  - platelet count blue top- 58    #Coagulopathy 2/2 sepsis  - LE dopplers - left saphenous vein DVT from groin to knee  - clotted RIJ on 6/22, needed to replace and inject heparin inside the new catheter  - Fibrinogen and D-Dimer elevated   - started on heparin gtt  6/22, stopped and given one dose of eliquis 10mg, then started back on 6/23   - platelet count improving     Endo:   #HLD  Pt has history of HLD  On Rosuvastatin 10 at home   Will continue as Atorvastatin 40 when given clearance by Surgery to advance diet.     Skin/ catheter: -   Fem central Line d/c'd  RIJ shiley 6/22   LIJ central line 6/20  [place] - 6/17-6/18  R Ax lori - 6/18  Caude langley - 6/17    Prophylaxis:   DVT: SCD - Advance to chemical as per surgery.   GI: PPI     Goals of Care: Full Code    Dispo: Will continue to monitor in ICU.

## 2023-06-24 NOTE — PROGRESS NOTE ADULT - ASSESSMENT
Patient is a 77y Male with HTN , HLD, Asthma p/w abd pain a/w SBO s/p OR 6/17 dx lap converted to ex lap LOU, SBRx2 with ileostomy creation due to spillage. Pt with Septic shock, hypotension on pressors with GA and hyperkalemia. Nephrology consulted for Elevated serum creatinine.    1. GA likely 2/2 ATN in the setting of septic shock. c/w with pressors to help renal perfusion. Pt with oliguric GA with worsening renal function/ fluid overload, for which pt was initiated on HD 6/22. Last HD earlier this morning on pressors. Plan for additional HD 6/26 if patient remains oliguric.  Prior intra-abdominal pressure was not significantly elevated, but if distention worsens, would again check intraabdominal pressure abdominal compression.  Strict I/Os. Avoid nephrotoxins/ NSAIDs/ RCA. Monitor BMP.    2. Volume overload- increase UF as tolerated with HD.  Monitor urine o/p    3. Hyperkalemia- resolved with medical management. Last serum K wnl. Monitor serum potassium    4. Acidosis- serum CO2 improving. Metabolic acidosis resolved; repeat serum lactate.  s/p bicarbonate pushes. Now with respiratory acidosis. Vent support as per ICU. Monitor serum Co2/ph    5. Septic Shock- antibiotics and pressors per ICU team. c/w pressors to maintain renal perfusion    6. 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 No specific w/u at this time.        [facility] [place] [facility]  Aneudy Brown M.D.  Raheel Darling D.O.  My Rodriguez M.D.  Nia Limon, PRECIOUS, ANP-C    Telephone: [phone]  Facsimile: [phone] [place] [address], [id number]  [place] [address]

## 2023-06-24 NOTE — CONSULT NOTE ADULT - SUBJECTIVE AND OBJECTIVE BOX
To be completed History from Admission H&P 6/18/23    <Start of quote(s) from H&P>  "History of Present Illness:   76 y/o male with PMHx of HTN , HLD, Asthma, no prior abdominal surgeries, complains of severe generalized abdominal pain and vomiting since about 8 PM.  Pain is described as sharp , persistent in the epigastric area with multiple > 4NBNB vomiting . Last BM was yesterday morning with persistent flatus . Denies fever or chills or dysuria. or any other complaints at this time.      Review of Systems:  Other Review of Systems: All other review of systems negative, except as noted in HPI   . . .     Home Medications:   * Outpatient Medication Status not yet specified    Patient History:    Past Medical, Past Surgical, and Family History:  PAST MEDICAL HISTORY:  History of asthma   History of high cholesterol   Hypertension.     PAST SURGICAL HISTORY:  No significant past surgical history.     Social History:  · Substance use	No"  <End of quote(s) from H&P>    Selected quotes from the Progress Note early this morning:    " . . .  CT [on admission] shows SBO. Pt was taken to the OR for diagnostic laproscopy which was converted to exploratory lap with ileostomy due to spillage. ICU was consulted as pt was requiring pressor support during and post surgery.     #SBO w/ abdominal perforation, spillage of gastric contents   #AHRF 2/2 PNA, ARDS  #Pankaj vs PANKAJ on CKD   . . .   Plan:  Neuro:  Currently intubated, no longer sedated, still not waking up, no purposeful movements or withdrawal to pain  CTH non-con negative for stroke/hemorrhage, possible uremic encephalopathy (BUN improving), metabolic encephalopath  Baseline AOx3  f/u spot EEG to assess for non-convulsive status    . . .   Cardiovascular:  #Shock - Septic Shock 2/2 to SBO complicated by abdominal perforation with spillage of gastric contents during surgery  Currently requiring pressor support with levo and vaso  Will titrate down as tolerated  Fluid status- worsening urine output,  edema on physical exam    HD 6/22, 6/23, and 6/24  Removing 1 L 6/23, removing 1.5 L today     #AFib  afib w/ rate to 120-130 initially, improved  continue to monitor    . . .     Pulmonary:   #Acute Hypoxic Respiratory Failure 2/2 to fluid overload vs. pneumonia vs. ARDS   . . .   Infectious Diseases:  #Septic Shock 2/2 to SBO complicated by abdominal perforation with spillage of gastric contents during surgery   . . .   CT abdomen pelvis w/ IV and PO contrast - limited oral contrast study since contrast remained in the abdomen.   surgery aware, set NGT to suction - drained 900cc   no plans for re-visit to OR  c/w meropenem   . . .   #Transaminitis   slighty uptrend in LFTs  likely in the setting of sepsis    Renal:  #ATN due to sepsis    . . .   Heme/onc:  #Thrombocytopenia possibly DIC   - Plts downtrending, 92 today (came in with 200s), improving    . . .   #Coagulopathy 2/2 sepsis  - LE dopplers - left saphenous vein DVT from groin to knee  - clotted [facility] on 6/22, needed to replace and inject heparin inside the new catheter  - Fibrinogen and D-Dimer elevated"      EXAMINATION    Supine in bed, intubated via ET tube, on vent support.        History from Admission H&P 6/18/23    <Start of quote(s) from H&P>  "History of Present Illness:   76 y/o male with PMHx of HTN , HLD, Asthma, no prior abdominal surgeries, complains of severe generalized abdominal pain and vomiting since about 8 PM.  Pain is described as sharp , persistent in the epigastric area with multiple > 4NBNB vomiting . Last BM was yesterday morning with persistent flatus . Denies fever or chills or dysuria. or any other complaints at this time.      Review of Systems:  Other Review of Systems: All other review of systems negative, except as noted in HPI   . . .     Home Medications:   * Outpatient Medication Status not yet specified    Patient History:    Past Medical, Past Surgical, and Family History:  PAST MEDICAL HISTORY:  History of asthma   History of high cholesterol   Hypertension.     PAST SURGICAL HISTORY:  No significant past surgical history.     Social History:  · Substance use	No"  <End of quote(s) from H&P>    Selected quotes from the Progress Note early this morning:    " . . .  CT [on admission] shows SBO. Pt was taken to the OR for diagnostic laproscopy which was converted to exploratory lap with ileostomy due to spillage. ICU was consulted as pt was requiring pressor support during and post surgery.     #SBO w/ abdominal perforation, spillage of gastric contents   #AHRF 2/2 PNA, ARDS  #Pankaj vs PANKAJ on CKD   . . .   Plan:  Neuro:  Currently intubated, no longer sedated, still not waking up, no purposeful movements or withdrawal to pain  CTH non-con negative for stroke/hemorrhage, possible uremic encephalopathy (BUN improving), metabolic encephalopath  Baseline AOx3  f/u spot EEG to assess for non-convulsive status    . . .   Cardiovascular:  #Shock - Septic Shock 2/2 to SBO complicated by abdominal perforation with spillage of gastric contents during surgery  Currently requiring pressor support with levo and vaso  Will titrate down as tolerated  Fluid status- worsening urine output,  edema on physical exam    HD 6/22, 6/23, and 6/24  Removing 1 L 6/23, removing 1.5 L today     #AFib  afib w/ rate to 120-130 initially, improved  continue to monitor    . . .     Pulmonary:   #Acute Hypoxic Respiratory Failure 2/2 to fluid overload vs. pneumonia vs. ARDS   . . .   Infectious Diseases:  #Septic Shock 2/2 to SBO complicated by abdominal perforation with spillage of gastric contents during surgery   . . .   CT abdomen pelvis w/ IV and PO contrast - limited oral contrast study since contrast remained in the abdomen.   surgery aware, set NGT to suction - drained 900cc   no plans for re-visit to OR  c/w meropenem   . . .   #Transaminitis   slighty uptrend in LFTs  likely in the setting of sepsis    Renal:  #ATN due to sepsis    . . .   Heme/onc:  #Thrombocytopenia possibly DIC   - Plts downtrending, 92 today (came in with 200s), improving    . . .   #Coagulopathy 2/2 sepsis  - LE dopplers - left saphenous vein DVT from groin to knee  - clotted [facility] on 6/22, needed to replace and inject heparin inside the new catheter  - Fibrinogen and D-Dimer elevated"      EXAMINATION    Supine in bed, intubated via ET tube, on vent support.   Vent set at 22  BPM; Pt overbreathing (27 BPM).  No spontaneous eye, facial, or limb movements noted.  No response to vida noise, voice.  Eyelids closed at all times; occasional minimal blink of closed lids; no resistance to passive opening.  Pupils 4mm --> 3mm.  Gaze mildly divergent.  No horizontal or vertical oculocephalic responses.  Corneal reflexes 1- bilaterally.  Weak nasociliary reflexes bilaterally.       Flaccid hypotonic limbs.  Areflexic.  Mute plantar responses.  No responses to brief irritating stimulation )stylohyiod pressure; four limbs).   History from Admission H&P 6/18/23    <Start of quote(s) from H&P>  "History of Present Illness:   76 y/o male with PMHx of HTN , HLD, Asthma, no prior abdominal surgeries, complains of severe generalized abdominal pain and vomiting since about 8 PM.  Pain is described as sharp , persistent in the epigastric area with multiple > 4NBNB vomiting . Last BM was yesterday morning with persistent flatus . Denies fever or chills or dysuria. or any other complaints at this time.      Review of Systems:  Other Review of Systems: All other review of systems negative, except as noted in HPI   . . .     Home Medications:   * Outpatient Medication Status not yet specified    Patient History:    Past Medical, Past Surgical, and Family History:  PAST MEDICAL HISTORY:  History of asthma   History of high cholesterol   Hypertension.     PAST SURGICAL HISTORY:  No significant past surgical history.     Social History:  · Substance use	No"  <End of quote(s) from H&P>    Selected quotes from the Progress Note early this morning:    " . . .  CT [on admission] shows SBO. Pt was taken to the OR for diagnostic laproscopy which was converted to exploratory lap with ileostomy due to spillage. ICU was consulted as pt was requiring pressor support during and post surgery.     #SBO w/ abdominal perforation, spillage of gastric contents   #AHRF 2/2 PNA, ARDS  #Pankaj vs PANKAJ on CKD   . . .   Plan:  Neuro:  Currently intubated, no longer sedated, still not waking up, no purposeful movements or withdrawal to pain  CTH non-con negative for stroke/hemorrhage, possible uremic encephalopathy (BUN improving), metabolic encephalopath  Baseline AOx3  f/u spot EEG to assess for non-convulsive status    . . .   Cardiovascular:  #Shock - Septic Shock 2/2 to SBO complicated by abdominal perforation with spillage of gastric contents during surgery  Currently requiring pressor support with levo and vaso  Will titrate down as tolerated  Fluid status- worsening urine output,  edema on physical exam    HD 6/22, 6/23, and 6/24  Removing 1 L 6/23, removing 1.5 L today     #AFib  afib w/ rate to 120-130 initially, improved  continue to monitor    . . .     Pulmonary:   #Acute Hypoxic Respiratory Failure 2/2 to fluid overload vs. pneumonia vs. ARDS   . . .   Infectious Diseases:  #Septic Shock 2/2 to SBO complicated by abdominal perforation with spillage of gastric contents during surgery   . . .   CT abdomen pelvis w/ IV and PO contrast - limited oral contrast study since contrast remained in the abdomen.   surgery aware, set NGT to suction - drained 900cc   no plans for re-visit to OR  c/w meropenem   . . .   #Transaminitis   slighty uptrend in LFTs  likely in the setting of sepsis    Renal:  #ATN due to sepsis    . . .   Heme/onc:  #Thrombocytopenia possibly DIC   - Plts downtrending, 92 today (came in with 200s), improving    . . .   #Coagulopathy 2/2 sepsis  - LE dopplers - left saphenous vein DVT from groin to knee  - clotted [facility] on 6/22, needed to replace and inject heparin inside the new catheter  - Fibrinogen and D-Dimer elevated"      EXAMINATION    Supine in bed, intubated via ET tube, on vent support.   Vent set at 22  BPM; Pt overbreathing (27 BPM).  No spontaneous eye, facial, or limb movements noted.  No response to vida noise, voice.  Eyelids closed at all times; occasional minimal blink of closed lids; no resistance to passive opening.  Pupils 4mm --> 3mm.  Gaze mildly divergent.  No horizontal or vertical oculocephalic responses.  Corneal reflexes 1- bilaterally.  Weak nasociliary reflexes bilaterally.       Flaccid hypotonic limbs.  Areflexic.  Mute plantar responses.  No responses to brief irritating stimulation (stylohyoid pressure; four limbs).          PER RADIOLOGY REPORT OF NON-CON HEAD CT YESTERDAY    "FINDINGS:   No previous examinations are available for review.    The brain demonstrates no abnormal attenuation.   No acute cerebral   cortical infarct is seen.  No intracranial hemorrhage is found.  No mass   effect is found in the brain.    The ventricles, sulci and basal cisterns appear unremarkable.    The orbits are unremarkable.  The paranasal sinuses are clear.  The nasal   cavity appears intact.  The nasopharynx is symmetric.  The central skull   base, petroustemporal bones and calvarium remain intact. ET and NG tubes   are noted.      IMPRESSION:   No acute abnormality is seen within the brain."      An EEG was ordered but could not be performed when the technician arrive as Pt was undergoing HD. never

## 2023-06-24 NOTE — PROGRESS NOTE ADULT - ASSESSMENT
Septic Shock  Peritonitis  Leukocytosis - mild  Resp failure    Plan:   ·	Cont Meropenem 500mgs iv q12hrs  D3  ·	Cont pressor support    Time spent - 33 mins

## 2023-06-25 NOTE — PROGRESS NOTE ADULT - ASSESSMENT
Septic Shock  Peritonitis  Leukocytosis - improved  Resp failure    Plan:   ·	Cont Meropenem 500mgs iv q12hrs  D4  ·	Cont pressor support    Time spent - 31 mins

## 2023-06-25 NOTE — PROGRESS NOTE ADULT - SUBJECTIVE AND OBJECTIVE BOX
ICU visit:  77y Male is under our care for septic shock, peritonitis, leukocytosis, and resp failure. Patient is doing slightly better today. Though he remains intubated (fio2- 30%, peep-5), he is showing more facial expressions. Down to one pressor today. Wbc count has decreased to 11.43 and renal function has improved as well.  and on 2 pressors. No significant fevers or low grade temps. Family is at bedside.     MEDS:  meropenem  IVPB 500 milliGRAM(s) IV Intermittent every 24 hours    ALLERGIES: Allergies    Bactrim (Hives; Rash)    Intolerances    REVIEW OF SYSTEMS:  [ X ] Not able to elicit  General:	  Chest:	  GI:	  :  Skin:	  Musculoskeletal:	  Neuro:	    VITALS:  ICU Vital Signs Last 24 Hrs  T(C): 37.1 (25 Jun 2023 13:15), Max: 37.3 (25 Jun 2023 01:45)  T(F): 98.8 (25 Jun 2023 13:15), Max: 99.1 (25 Jun 2023 01:45)  HR: 87 (25 Jun 2023 13:15) (60 - 97)  BP: 112/50 (25 Jun 2023 12:00) (92/44 - 128/57)  BP(mean): 65 (25 Jun 2023 12:00) (58 - 77)  ABP: 100/45 (25 Jun 2023 13:15) (82/46 - 149/58)  ABP(mean): 60 (25 Jun 2023 13:15) (57 - 93)  RR: 35 (25 Jun 2023 13:15) (12 - 38)  SpO2: 97% (25 Jun 2023 13:15) (94% - 100%)    PHYSICAL EXAM:  HEENT: +salem sump +trach via ETT   Neck: supple no LN's, left neck CVP intact, Right neck shiley  Respiratory: bilateral rhoncherous lung sounds, no rales  Cardiovascular: S1 S2 reg no murmurs  Gastrointestinal: +rare BS with firm, moderately distended abdomen; nontender +ileostomy +drain cath  : +langley  Extremities: mild bilateral hand edema  Skin: no rashes  Ortho: n/a  Neuro: non alert or oriented but arousable with tactile stimuli    LABS/DIAGNOSTIC TESTS:                        9.4    11.43 )-----------( 118      ( 25 Jun 2023 03:19 )             28.5   WBC Count: 11.43 K/uL (06-25 @ 03:19)  WBC Count: 13.62 K/uL (06-24 @ 03:15)  WBC Count: 13.96 K/uL (06-23 @ 17:40)  WBC Count: 9.38 K/uL (06-23 @ 03:48)  WBC Count: 9.65 K/uL (06-22 @ 19:53)    06-25    139  |  104  |  93<H>  ----------------------------<  189<H>  4.5   |  24  |  4.48<H>    Ca    7.9<L>      25 Jun 2023 03:19  Phos  6.4     06-25  Mg     2.4     06-25    TPro  5.6<L>  /  Alb  1.6<L>  /  TBili  1.1  /  DBili  x   /  AST  52<H>  /  ALT  52  /  AlkPhos  68  06-25    ABG - ( 25 Jun 2023 03:33 )  pH, Arterial: 7.39  pH, Blood: x     /  pCO2: 39    /  pO2: 134   / HCO3: 24    / Base Excess: -1.2  /  SaO2: 100         CULTURES:   .Blood Blood-Peripheral  06-18 @ 16:35   No Growth Final  --  --      .Blood Blood-Peripheral  06-18 @ 16:30   No Growth Final  --  --      Clean Catch Clean Catch (Midstream)  06-17 @ 04:52   10,000 - 49,000 CFU/mL Enterococcus faecalis  <10,000 CFU/ml Normal Urogenital aure present  --  Enterococcus faecalis        RADIOLOGY: No new studies

## 2023-06-25 NOTE — PROGRESS NOTE ADULT - SUBJECTIVE AND OBJECTIVE BOX
[facility] NEPHROLOGY- PROGRESS NOTE    Patient is a 77y Male with HTN , HLD, Asthma p/w abd pain a/w SBO s/p OR 6/17 dx lap converted to ex lap LOU, SBRx2 with ileostomy creation due to spillage. Pt with Septic shock, hypotension on pressors with GA and hyperkalemia. Nephrology consulted for Elevated serum creatinine.      REVIEW OF SYSTEMS: unable to obtain; pt intubated       VITALS:  T(F): 98.6 (06-25-23 @ 10:00), Max: 99.1 (06-25-23 @ 01:45)  HR: 89 (06-25-23 @ 10:00)  BP: 128/57 (06-25-23 @ 08:00)  RR: 35 (06-25-23 @ 10:00)  SpO2: 96% (06-25-23 @ 10:00)  Wt(kg): --    06-24 @ 07:01  -  06-25 @ 07:00  --------------------------------------------------------  IN: 1049.8 mL / OUT: 3297 mL / NET: -2247.2 mL    06-25 @ 07:01  -  06-25 @ 10:25  --------------------------------------------------------  IN: 84 mL / OUT: 10 mL / NET: 74 mL        PHYSICAL EXAM:  Constitutional: intubated, off sedation  HEENT: +ETT  Neck: No JVD  Respiratory: mechanical BS B/L  Cardiovascular: RRR, S1S2  Gastrointestinal: +ostomy R upper abdomen, midline incision, + distended but still soft.  Extremities: + LE edema b/l/ +sacral edema  :  +langley +scrotal edema  Access: Rt IJ non tunneled HD catheter- benign      LABS:  06-25    139  |  104  |  93<H>  ----------------------------<  189<H>  4.5   |  24  |  4.48<H>    Ca    7.9<L>      25 Jun 2023 03:19  Phos  6.4     06-25  Mg     2.4     06-25    TPro  5.6<L>  /  Alb  1.6<L>  /  TBili  1.1  /  DBili      /  AST  52<H>  /  ALT  52  /  AlkPhos  68  06-25    Creatinine Trend: 4.48 <--, 4.76 <--, 4.26 <--, 4.98 <--, 4.52 <--, 5.41 <--, 4.79 <--, 4.70 <--, 3.90 <--, 3.88 <--, 3.65 <--, 3.47 <--, 3.39 <--, 3.55 <--, 3.25 <--, 3.16 <--                        9.4    11.43 )-----------( 118      ( 25 Jun 2023 03:19 )             28.5     Urine Studies:  Urinalysis Basic - ( 25 Jun 2023 03:19 )    Color:  / Appearance:  / SG:  / pH:   Gluc: 189 mg/dL / Ketone:   / Bili:  / Urobili:    Blood:  / Protein:  / Nitrite:    Leuk Esterase:  / RBC:  / WBC    Sq Epi:  / Non Sq Epi:  / Bacteria:       Sodium, Random Urine: 58 mmol/L (06-19 @ 09:50)  Potassium, Random Urine: 54 mmol/L (06-19 @ 09:50)  Creatinine, Random Urine: 76 mg/dL (06-19 @ 09:50)        < from: US Urinary Bladder (06.24.23 @ 12:12) >  IMPRESSION: As above.    --- End of Report ---    < end of copied text >

## 2023-06-25 NOTE — CHART NOTE - NSCHARTNOTEFT_GEN_A_CORE
Please see yesterday's Neurology Consult Note for recommended lab tests. Please see yesterday's Neurology Consult Note for recommended lab tests.    Would get repeat non-con head CT (if he had an episode of hypoperfusion/hypoxia changes might now be seen).        Jordan Mancera M.D.   - Department of Neurology  [employer] [employer] [facility]

## 2023-06-25 NOTE — PROGRESS NOTE ADULT - ASSESSMENT
78 y/o male with PMHx of HTN , HLD, Asthma, no prior abdominal surgeries, complains of severe generalized abdominal pain and vomiting since about 8 PM.  Pain is described as sharp , persistent in the epigastric area with multiple > 4NBNB vomiting . Last BM was yesterday morning with persistent flatus. Mild leucocytosis, dehydrated , lactate is 2.1 . CT shows SBO. Pt was taken to the OR for diagnostic laproscopy which was converted to exploratory lap with ileostomy due to spillage. ICU was consulted as pt was requiring pressor support during and post surgery.     #SBO w/ abdominal perforation, spillage of gastric contents   #AHRF 2/2 PNA, ARDS  #Pankaj vs PANKAJ on CKD  #HTN  #HLD  #Asthma      Plan:  Neuro:  Currently intubated, no longer sedated, still not waking up, no purposeful movements or withdrawal to pain  CTH non-con negative for stroke/hemorrhage, possible uremic encephalopathy (BUN improving), metabolic encephalopath  Baseline AOx3  f/u spot EEG to assess for non-convulsive status   Dr. oconnell consulted   started on heparin gtt     Cardiovascular:  #Shock - Septic Shock 2/2 to SBO complicated by abdominal perforation with spillage of gastric contents during surgery  Currently requiring pressor support with levo and vaso  Will titrate down as tolerated  Fluid status- worsening urine output,  edema on physical exam    HD 6/22, 6/23, and 6/24  Removing 1 L 6/23, removing 1.5 L today     #AFib  afib w/ rate to 120-130 initially, improved  continue to monitor     #HTN   Pt has history of HTN on amlodipine.  Will hold in the setting of shock.     Pulmonary:   #Acute Hypoxic Respiratory Failure 2/2 to fluid overload vs. pneumonia vs. ARDS  POCUS exam 6/19- diffuse B-lines, signs of retrocardiac consolidations    P/F ratio: <300, mild-mod hypoxemia, indicative of ARDS   pt. on FiO2 40%, PEEP 8 today, will keep vented due to lack of mental status   pt on meropenem, will cover PNA   SAT/SBT    Infectious Diseases:  #Septic Shock 2/2 to SBO complicated by abdominal perforation with spillage of gastric contents during surgery  s/p exploratory laproscopy  s/p ileostomy - minimal fecal content in ileostomy   NGT in place, started suction 6/24, tube feeds stopped 6/23  Temp spiking on 6/23, no recent fever spikes   CT abdomen pelvis w/ IV and PO contrast - limited oral contrast study since contrast remained in the abdomen.   surgery aware, set NGT to suction - drained 900cc   no plans for re-visit to OR  c/w meropenem      Gastrointestinal:  #SBO  s/p exploratory laproscopy.   s/p ileostomy   will keep npo   NGT in place, started suction 6/24, tube feeds stopped 6/23  Temp spiking on 6/23, no recent fever spikes   CT abdomen pelvis w/ IV and PO contrast - limited oral contrast study since contrast remained in the abdomen.   surgery aware, set NGT to suction - drained 900cc   no plans for re-visit to OR  c/w meropenem      #Transaminitis   slighty uptrend in LFTs  likely in the setting of sepsis    Renal:  #ATN due to sepsis   Pt w/ SCr 2.08 on admission  Baseline SCr - Unknown  Scr 4/76, slight downtrend after 2 HD sessions , BUN downtrending as well   Urine lytes- show intrinsic picture, likely ATN   HD 6/22, 6/23, 6/24   hold IVF due to risk of fluid overload  follow BMP daily    #Metabolic Acidosis  ABG 7.34/43/107/23 this AM   Metabolic acidosis likely due to septic shock, lactic acidosis   Acidosis improving after adjustment of the vent settings  Nephro Dr. Brown      #Hyperkalemia   Potassium 5.7 > 6.2 > 5.5 > 5.4 > 4.4   resolving     Heme/onc:  #Thrombocytopenia possibly DIC   - Plts downtrending, 92 today (came in with 200s), improving   - possibly in the setting of infection, ITP, DIC   - fibrinogen 902> 116 (lab error) > 810, >362> 353, hapto 219  - platelet count blue top- 58    #Coagulopathy 2/2 sepsis  - LE dopplers - left saphenous vein DVT from groin to knee  - clotted RIJ on 6/22, needed to replace and inject heparin inside the new catheter  - Fibrinogen and D-Dimer elevated   - started on heparin gtt  6/22, stopped and given one dose of eliquis 10mg, then started back on 6/23   - platelet count improving     Endo:   #HLD  Pt has history of HLD  On Rosuvastatin 10 at home   Will continue as Atorvastatin 40 when given clearance by Surgery to advance diet.     Skin/ catheter: -   Fem central Line d/c'd  RIJ shiley 6/22   LIJ central line 6/20  [place] - 6/17-6/18  R Ax lori - 6/18  Caude langley - 6/17    Prophylaxis:   DVT: SCD - Advance to chemical as per surgery.   GI: PPI     Goals of Care: Full Code    Dispo: Will continue to monitor in ICU.    76 y/o male with PMHx of HTN , HLD, Asthma, no prior abdominal surgeries, complains of severe generalized abdominal pain and vomiting since about 8 PM.  Pain is described as sharp , persistent in the epigastric area with multiple > 4NBNB vomiting . Last BM was yesterday morning with persistent flatus. Mild leucocytosis, dehydrated , lactate is 2.1 . CT shows SBO. Pt was taken to the OR for diagnostic laproscopy which was converted to exploratory lap with ileostomy due to spillage. ICU was consulted as pt was requiring pressor support during and post surgery.     #SBO w/ abdominal perforation, spillage of gastric contents   #AHRF 2/2 PNA, ARDS  #Pankaj vs PANKAJ on CKD  #HTN  #HLD  #Asthma      Plan:  Neuro:  Currently intubated, no longer sedated, still not waking up, no purposeful movements or withdrawal to pain  CTH non-con negative for stroke/hemorrhage, possible uremic encephalopathy (BUN improving), metabolic encephalopath  Baseline AOx3  f/u spot EEG to assess for non-convulsive status   Dr. oconnell consulted   started on heparin gtt     Cardiovascular:  #Shock - Septic Shock 2/2 to SBO complicated by abdominal perforation with spillage of gastric contents during surgery  Currently requiring pressor support  Will titrate down as tolerated  Fluid status- worsening urine output,  edema on physical exam    HD 6/22, 6/23, and 6/24, 6/26    #AFib  afib w/ rate to 120-130 initially, improved  continue to monitor     #HTN   Pt has history of HTN on amlodipine.  Will hold in the setting of shock.     Pulmonary:   #Acute Hypoxic Respiratory Failure 2/2 to fluid overload vs. pneumonia vs. ARDS  POCUS exam 6/19- diffuse B-lines, signs of retrocardiac consolidations    P/F ratio: <300, mild-mod hypoxemia, indicative of ARDS   pt. on FiO2 40%, PEEP 8 today, will keep vented due to lack of mental status   pt on meropenem, will cover PNA   SAT/SBT    Infectious Diseases:  #Septic Shock 2/2 to SBO complicated by abdominal perforation with spillage of gastric contents during surgery  s/p exploratory laproscopy  s/p ileostomy - minimal fecal content in ileostomy   NGT in place, started suction 6/24, tube feeds stopped 6/23  Temp spiking on 6/23, no recent fever spikes   CT abdomen pelvis w/ IV and PO contrast - limited oral contrast study since contrast remained in the abdomen. Suggestive of continued SBO, atretic ostomy site, and possible bowel necrosis.  Catheter placed in ostomy site to keep patent  surgery aware, set NGT to suction - drained 900cc   no plans for re-visit to OR  c/w meropenem      Gastrointestinal:  #SBO  s/p exploratory laproscopy.   s/p ileostomy   will keep npo   NGT in place, started suction 6/24, tube feeds stopped 6/23  Temp spiking on 6/23, no recent fever spikes   CT abdomen pelvis w/ IV and PO contrast - limited oral contrast study since contrast remained in the abdomen. Suggestive of continued SBO, atretic ostomy site, and possible bowel necrosis.  -catheter placed to hold ostomy site patent  surgery aware, set NGT to suction - drained 900cc   no plans for re-visit to OR  c/w meropenem      #Transaminitis   slighty uptrend in LFTs  likely in the setting of sepsis    Renal:  #ATN due to sepsis   Pt w/ SCr 2.08 on admission  Baseline SCr - Unknown  Scr 4/76, slight downtrend after 2 HD sessions , BUN downtrending as well   Urine lytes- show intrinsic picture, likely ATN   HD 6/22, 6/23, 6/24, 6/26  hold IVF due to risk of fluid overload  follow BMP daily    #Metabolic Acidosis  ABG 7.39/39/134/24 -------6/25  Metabolic acidosis likely due to septic shock, lactic acidosis   Acidosis improving after adjustment of the vent settings  Nephro Dr. Brown      #Hyperkalemia   Potassium 5.7 > 6.2 > 5.5 > 5.4 > 4.4   resolving     Heme/onc:  #Thrombocytopenia possibly DIC   - Plts downtrending, 92 today (came in with 200s), improving   - possibly in the setting of infection, ITP, DIC   - fibrinogen 902> 116 (lab error) > 810, >362> 353, hapto 219  - platelet count blue top- 58    #Coagulopathy 2/2 sepsis  - LE dopplers - left saphenous vein DVT from groin to knee  - clotted RIJ on 6/22, needed to replace and inject heparin inside the new catheter  - Fibrinogen and D-Dimer elevated   - started on heparin gtt  6/22, stopped and given one dose of eliquis 10mg, then started back on 6/23, still running 6/25   - platelet count improving     Endo:   #HLD  Pt has history of HLD  On Rosuvastatin 10 at home   hold statin for now    Skin/ catheter: -   Fem central Line d/c'd  GLEN [place] 6/22   LIJ central line 6/20  [place] - 6/17-6/18  R Ax lori - 6/18  Caude langley - 6/17    Prophylaxis:   DVT: SCD - Advance to chemical as per surgery.   GI: PPI     Goals of Care: Full Code    Dispo: Will continue to monitor in ICU.

## 2023-06-25 NOTE — PROGRESS NOTE ADULT - SUBJECTIVE AND OBJECTIVE BOX
INTERVAL HPI/OVERNIGHT EVENTS:  Seen and examined  Intubated/sedated     MEDICATIONS  (STANDING):  artificial  tears Solution 1 Drop(s) Both EYES four times a day  chlorhexidine 2% Cloths 1 Application(s) Topical <User Schedule>  heparin  Infusion.  Unit(s)/Hr (15 mL/Hr) IV Continuous <Continuous>  insulin lispro (ADMELOG) corrective regimen sliding scale   SubCutaneous every 6 hours  meropenem  IVPB 500 milliGRAM(s) IV Intermittent every 24 hours  norepinephrine Infusion 0.45 MICROgram(s)/kG/Min (34.2 mL/Hr) IV Continuous <Continuous>  pantoprazole  Injectable 40 milliGRAM(s) IV Push daily    MEDICATIONS  (PRN):  heparin   Injectable 6500 Unit(s) IV Push every 6 hours PRN For aPTT less than 40  heparin   Injectable 3000 Unit(s) IV Push every 6 hours PRN For aPTT between 40 - 57  sodium chloride 0.9% lock flush 10 milliLiter(s) IV Push every 1 hour PRN Pre/post blood products, medications, blood draw, and to maintain line patency      Vital Signs Last 24 Hrs  T(C): 36.9 (25 Jun 2023 09:41), Max: 37.3 (24 Jun 2023 10:15)  T(F): 98.4 (25 Jun 2023 09:41), Max: 99.1 (24 Jun 2023 10:15)  HR: 81 (25 Jun 2023 09:41) (60 - 99)  BP: 128/57 (25 Jun 2023 08:00) (114/58 - 128/57)  BP(mean): 77 (25 Jun 2023 08:00) (71 - 77)  RR: 33 (25 Jun 2023 09:41) (12 - 36)  SpO2: 96% (25 Jun 2023 09:41) (96% - 100%)    Parameters below as of 25 Jun 2023 07:00  Patient On (Oxygen Delivery Method): ventilator    O2 Concentration (%): 30    Physical:  General: Intubated, sedated   Skin: No jaundice, no icterus  Abdomen: Midline staples intact. Nonerythematous. Packing changed at bedside with gauze/tape dressing. No active bleeding or drainage noted. Ostomy pink/viable, red rubber in ostomy, no air or stool in bag       I&O's Detail    24 Jun 2023 07:01 - 25 Jun 2023 07:00  --------------------------------------------------------  IN:    Heparin Infusion: 345 mL    IV PiggyBack: 70 mL    Norepinephrine: 490.8 mL    Vasopressin: 144 mL  Total IN: 1049.8 mL    OUT:    Ileostomy (mL): 2 mL    Indwelling Catheter - Urethral (mL): 195 mL    Nasogastric/Oral tube (mL): 1600 mL    Other (mL): 1500 mL  Total OUT: 3297 mL    Total NET: -2247.2 mL      25 Jun 2023 07:01  -  25 Jun 2023 10:07  --------------------------------------------------------  IN:    Heparin Infusion: 32 mL    Norepinephrine: 28 mL    Vasopressin: 12 mL  Total IN: 72 mL    OUT:    Indwelling Catheter - Urethral (mL): 10 mL  Total OUT: 10 mL    Total NET: 62 mL          LABS:                        9.4    11.43 )-----------( 118      ( 25 Jun 2023 03:19 )             28.5             06-25    139  |  104  |  93<H>  ----------------------------<  189<H>  4.5   |  24  |  4.48<H>    Ca    7.9<L>      25 Jun 2023 03:19  Phos  6.4     06-25  Mg     2.4     06-25    TPro  5.6<L>  /  Alb  1.6<L>  /  TBili  1.1  /  DBili  x   /  AST  52<H>  /  ALT  52  /  AlkPhos  68  06-25

## 2023-06-25 NOTE — PROGRESS NOTE ADULT - ASSESSMENT
78 y/o male s/p dx lap converted to ex lap LOU, SBRx2 with ileostomy creation POD#8  -NPO, IVF  -NGT to LWS, irrigate prn  -IV abx  -Continue local wound care/packing changes daily   -Monitor ostomy fxn, red rubber catheter  -Remainder of care per ICU/Primary team  -Discussed with attending

## 2023-06-25 NOTE — PROGRESS NOTE ADULT - SUBJECTIVE AND OBJECTIVE BOX
INTERVAL HPI/OVERNIGHT EVENTS: ***    PRESSORS: [ ] YES [ ] NO  WHICH:    Antimicrobial:  meropenem  IVPB 500 milliGRAM(s) IV Intermittent every 24 hours    Cardiovascular:  norepinephrine Infusion 0.45 MICROgram(s)/kG/Min IV Continuous <Continuous>    Pulmonary:    Hematalogic:  heparin   Injectable 3000 Unit(s) IV Push every 6 hours PRN  heparin   Injectable 6500 Unit(s) IV Push every 6 hours PRN  heparin  Infusion.  Unit(s)/Hr IV Continuous <Continuous>    Other:  artificial  tears Solution 1 Drop(s) Both EYES four times a day  chlorhexidine 2% Cloths 1 Application(s) Topical <User Schedule>  insulin lispro (ADMELOG) corrective regimen sliding scale   SubCutaneous every 6 hours  pantoprazole  Injectable 40 milliGRAM(s) IV Push daily  sodium chloride 0.9% lock flush 10 milliLiter(s) IV Push every 1 hour PRN  vasopressin Infusion 0.04 Unit(s)/Min IV Continuous <Continuous>    artificial  tears Solution 1 Drop(s) Both EYES four times a day  chlorhexidine 2% Cloths 1 Application(s) Topical <User Schedule>  heparin   Injectable 6500 Unit(s) IV Push every 6 hours PRN  heparin   Injectable 3000 Unit(s) IV Push every 6 hours PRN  heparin  Infusion.  Unit(s)/Hr IV Continuous <Continuous>  insulin lispro (ADMELOG) corrective regimen sliding scale   SubCutaneous every 6 hours  meropenem  IVPB 500 milliGRAM(s) IV Intermittent every 24 hours  norepinephrine Infusion 0.45 MICROgram(s)/kG/Min IV Continuous <Continuous>  pantoprazole  Injectable 40 milliGRAM(s) IV Push daily  sodium chloride 0.9% lock flush 10 milliLiter(s) IV Push every 1 hour PRN  vasopressin Infusion 0.04 Unit(s)/Min IV Continuous <Continuous>    Drug Dosing Weight  Height (cm): 170 (17 Jun 2023 01:51)  Weight (kg): 81 (17 Jun 2023 19:13)  BMI (kg/m2): 28 (17 Jun 2023 19:13)  BSA (m2): 1.93 (17 Jun 2023 19:13)    CENTRAL LINE: [ ] YES [ ] NO  LOCATION:   DATE INSERTED:  REMOVE: [ ] YES [ ] NO  EXPLAIN:    LANGLEY: [ ] YES [ ] NO    DATE INSERTED:  REMOVE:  [ ] YES [ ] NO  EXPLAIN:    A-LINE:  [ ] YES [ ] NO  LOCATION:   DATE INSERTED:  REMOVE:  [ ] YES [ ] NO  EXPLAIN:    PMH -reviewed admission note, no change since admission  PAST MEDICAL & SURGICAL HISTORY:  Hypertension      History of asthma      History of high cholesterol      No significant past surgical history          ICU Vital Signs Last 24 Hrs  T(C): 37.1 (25 Jun 2023 00:15), Max: 37.8 (24 Jun 2023 04:15)  T(F): 98.8 (25 Jun 2023 00:15), Max: 100 (24 Jun 2023 04:15)  HR: 82 (25 Jun 2023 00:17) (64 - 102)  BP: 149/59 (24 Jun 2023 04:00) (136/59 - 149/59)  BP(mean): 83 (24 Jun 2023 04:00) (81 - 83)  ABP: 126/59 (25 Jun 2023 00:15) (88/44 - 160/85)  ABP(mean): 81 (25 Jun 2023 00:15) (58 - 128)  RR: 30 (25 Jun 2023 00:15) (10 - 41)  SpO2: 99% (25 Jun 2023 00:17) (87% - 100%)    O2 Parameters below as of 24 Jun 2023 20:00  Patient On (Oxygen Delivery Method): ventilator    O2 Concentration (%): 40        ABG - ( 24 Jun 2023 03:01 )  pH, Arterial: 7.34  pH, Blood: x     /  pCO2: 43    /  pO2: 107   / HCO3: 23    / Base Excess: -2.5  /  SaO2: 100                   06-23 @ 07:01  -  06-24 @ 07:00  --------------------------------------------------------  IN: 1604.9 mL / OUT: 1780 mL / NET: -175.1 mL        Mode: AC/ CMV (Assist Control/ Continuous Mandatory Ventilation)  RR (machine): 22  TV (machine): 400  FiO2: 40  PEEP: 8  ITime: 0.8  MAP: 15  PIP: 30      PHYSICAL EXAM:  GENERAL: NAD, intubated , not sedated   HEAD:  Atraumatic, Normocephalic  EYES: EOMI, PERRLA,  pupils 2mm  ENMT: No tonsillar erythema, exudates, or enlargement; Moist mucous membranes, Good dentition, No lesions; Sump tube connected to suction draining biliary fluid  NECK: ETT in place  NERVOUS SYSTEM:  No longer on sedation, no withdrawal to pain, neg gag reflex   CHEST/LUNG: No chest deformity; Normal percussion bilaterally; No rales, rhonchi, wheezing   HEART: Regular rate and rhythm; No murmurs, rubs, or gallops  ABDOMEN: Soft, Nontender, distended; Bowel sounds present, (+) ileostomy in place draining 15cc of fluid, surgical scar w/ clean dressing.    : langley catheter in place, draining scant amount of urine  EXTREMITIES:  2+ Peripheral Pulses, No clubbing, cyanosis, (+) upper and lower extremity edema, scrotal edema   LYMPH: No lymphadenopathy noted  SKIN: No rashes or lesions;  Good capillary refill              LABS:  CBC Full  -  ( 24 Jun 2023 03:15 )  WBC Count : 13.62 K/uL  RBC Count : 3.23 M/uL  Hemoglobin : 9.4 g/dL  Hematocrit : 28.2 %  Platelet Count - Automated : 92 K/uL  Mean Cell Volume : 87.3 fl  Mean Cell Hemoglobin : 29.1 pg  Mean Cell Hemoglobin Concentration : 33.3 gm/dL  Auto Neutrophil # : 11.85 K/uL  Auto Lymphocyte # : 0.68 K/uL  Auto Monocyte # : 0.14 K/uL  Auto Eosinophil # : 0.00 K/uL  Auto Basophil # : 0.00 K/uL  Auto Neutrophil % : 78.0 %  Auto Lymphocyte % : 5.0 %  Auto Monocyte % : 1.0 %  Auto Eosinophil % : 0.0 %  Auto Basophil % : 0.0 %    06-24    141  |  104  |  99<H>  ----------------------------<  204<H>  4.9   |  24  |  4.76<H>    Ca    7.6<L>      24 Jun 2023 03:15  Phos  6.6     06-24  Mg     2.4     06-24    TPro  5.1<L>  /  Alb  1.7<L>  /  TBili  1.1  /  DBili  x   /  AST  65<H>  /  ALT  70<H>  /  AlkPhos  74  06-24    PT/INR - ( 24 Jun 2023 03:15 )   PT: 20.2 sec;   INR: 1.69 ratio         PTT - ( 24 Jun 2023 19:50 )  PTT:65.0 sec  Urinalysis Basic - ( 24 Jun 2023 03:15 )    Color: x / Appearance: x / SG: x / pH: x  Gluc: 204 mg/dL / Ketone: x  / Bili: x / Urobili: x   Blood: x / Protein: x / Nitrite: x   Leuk Esterase: x / RBC: x / WBC x   Sq Epi: x / Non Sq Epi: x / Bacteria: x          RADIOLOGY & ADDITIONAL STUDIES REVIEWED:  ***    [ ]GOALS OF CARE DISCUSSION WITH PATIENT/FAMILY/PROXY:    CRITICAL CARE TIME SPENT: 35 minutes INTERVAL HPI/OVERNIGHT EVENTS: No acute events overnight.     PRESSORS: [x ] YES [ ] NO  WHICH: levo    Antimicrobial:  meropenem  IVPB 500 milliGRAM(s) IV Intermittent every 24 hours    Cardiovascular:  norepinephrine Infusion 0.45 MICROgram(s)/kG/Min IV Continuous <Continuous>    Pulmonary:    Hematalogic:  heparin   Injectable 3000 Unit(s) IV Push every 6 hours PRN  heparin   Injectable 6500 Unit(s) IV Push every 6 hours PRN  heparin  Infusion.  Unit(s)/Hr IV Continuous <Continuous>    Other:  artificial  tears Solution 1 Drop(s) Both EYES four times a day  chlorhexidine 2% Cloths 1 Application(s) Topical <User Schedule>  insulin lispro (ADMELOG) corrective regimen sliding scale   SubCutaneous every 6 hours  pantoprazole  Injectable 40 milliGRAM(s) IV Push daily  sodium chloride 0.9% lock flush 10 milliLiter(s) IV Push every 1 hour PRN  vasopressin Infusion 0.04 Unit(s)/Min IV Continuous <Continuous>    artificial  tears Solution 1 Drop(s) Both EYES four times a day  chlorhexidine 2% Cloths 1 Application(s) Topical <User Schedule>  heparin   Injectable 6500 Unit(s) IV Push every 6 hours PRN  heparin   Injectable 3000 Unit(s) IV Push every 6 hours PRN  heparin  Infusion.  Unit(s)/Hr IV Continuous <Continuous>  insulin lispro (ADMELOG) corrective regimen sliding scale   SubCutaneous every 6 hours  meropenem  IVPB 500 milliGRAM(s) IV Intermittent every 24 hours  norepinephrine Infusion 0.45 MICROgram(s)/kG/Min IV Continuous <Continuous>  pantoprazole  Injectable 40 milliGRAM(s) IV Push daily  sodium chloride 0.9% lock flush 10 milliLiter(s) IV Push every 1 hour PRN  vasopressin Infusion 0.04 Unit(s)/Min IV Continuous <Continuous>    Drug Dosing Weight  Height (cm): 170 (17 Jun 2023 01:51)  Weight (kg): 81 (17 Jun 2023 19:13)  BMI (kg/m2): 28 (17 Jun 2023 19:13)  BSA (m2): 1.93 (17 Jun 2023 19:13)    CENTRAL LINE: [ x] YES [ ] NO  LOCATION:   DATE INSERTED:  REMOVE: [ ] YES [ ] NO  EXPLAIN:    LANGLEY: [x ] YES [ ] NO    DATE INSERTED:  REMOVE:  [ ] YES [ ] NO  EXPLAIN:    A-LINE:  [x ] YES [ ] NO  LOCATION:   DATE INSERTED:  REMOVE:  [ ] YES [ ] NO  EXPLAIN:    PMH -reviewed admission note, no change since admission  PAST MEDICAL & SURGICAL HISTORY:  Hypertension      History of asthma      History of high cholesterol      No significant past surgical history          ICU Vital Signs Last 24 Hrs  T(C): 37.1 (25 Jun 2023 00:15), Max: 37.8 (24 Jun 2023 04:15)  T(F): 98.8 (25 Jun 2023 00:15), Max: 100 (24 Jun 2023 04:15)  HR: 82 (25 Jun 2023 00:17) (64 - 102)  BP: 149/59 (24 Jun 2023 04:00) (136/59 - 149/59)  BP(mean): 83 (24 Jun 2023 04:00) (81 - 83)  ABP: 126/59 (25 Jun 2023 00:15) (88/44 - 160/85)  ABP(mean): 81 (25 Jun 2023 00:15) (58 - 128)  RR: 30 (25 Jun 2023 00:15) (10 - 41)  SpO2: 99% (25 Jun 2023 00:17) (87% - 100%)    O2 Parameters below as of 24 Jun 2023 20:00  Patient On (Oxygen Delivery Method): ventilator    O2 Concentration (%): 40        ABG - ( 24 Jun 2023 03:01 )  pH, Arterial: 7.34  pH, Blood: x     /  pCO2: 43    /  pO2: 107   / HCO3: 23    / Base Excess: -2.5  /  SaO2: 100                   06-23 @ 07:01  -  06-24 @ 07:00  --------------------------------------------------------  IN: 1604.9 mL / OUT: 1780 mL / NET: -175.1 mL        Mode: AC/ CMV (Assist Control/ Continuous Mandatory Ventilation)  RR (machine): 22  TV (machine): 400  FiO2: 40  PEEP: 8  ITime: 0.8  MAP: 15  PIP: 30      PHYSICAL EXAM:  GENERAL: NAD, intubated , not sedated   HEAD:  Atraumatic, Normocephalic  EYES: EOMI, PERRLA,  pupils 2mm  ENMT: No tonsillar erythema, exudates, or enlargement; Moist mucous membranes, Good dentition, No lesions; Sump tube connected to suction draining biliary fluid  NECK: ETT in place  NERVOUS SYSTEM:  No longer on sedation, +withdrawal to noxious stimuli  CHEST/LUNG: No chest deformity; Normal percussion bilaterally; No rales, rhonchi, wheezing   HEART: Regular rate and rhythm; No murmurs, rubs, or gallops  ABDOMEN: Soft, Nontender, distended; Bowel sounds present, (+) ileostomy in place draining 15cc of fluid, surgical scar w/ clean dressing.    : langley catheter in place, draining scant amount of urine  EXTREMITIES:  2+ Peripheral Pulses, No clubbing, cyanosis, (+) upper and lower extremity edema, significant scrotal edema   LYMPH: No lymphadenopathy noted  SKIN: No rashes or lesions;  Good capillary refill              LABS:  CBC Full  -  ( 24 Jun 2023 03:15 )  WBC Count : 13.62 K/uL  RBC Count : 3.23 M/uL  Hemoglobin : 9.4 g/dL  Hematocrit : 28.2 %  Platelet Count - Automated : 92 K/uL  Mean Cell Volume : 87.3 fl  Mean Cell Hemoglobin : 29.1 pg  Mean Cell Hemoglobin Concentration : 33.3 gm/dL  Auto Neutrophil # : 11.85 K/uL  Auto Lymphocyte # : 0.68 K/uL  Auto Monocyte # : 0.14 K/uL  Auto Eosinophil # : 0.00 K/uL  Auto Basophil # : 0.00 K/uL  Auto Neutrophil % : 78.0 %  Auto Lymphocyte % : 5.0 %  Auto Monocyte % : 1.0 %  Auto Eosinophil % : 0.0 %  Auto Basophil % : 0.0 %    06-24    141  |  104  |  99<H>  ----------------------------<  204<H>  4.9   |  24  |  4.76<H>    Ca    7.6<L>      24 Jun 2023 03:15  Phos  6.6     06-24  Mg     2.4     06-24    TPro  5.1<L>  /  Alb  1.7<L>  /  TBili  1.1  /  DBili  x   /  AST  65<H>  /  ALT  70<H>  /  AlkPhos  74  06-24    PT/INR - ( 24 Jun 2023 03:15 )   PT: 20.2 sec;   INR: 1.69 ratio         PTT - ( 24 Jun 2023 19:50 )  PTT:65.0 sec  Urinalysis Basic - ( 24 Jun 2023 03:15 )    Color: x / Appearance: x / SG: x / pH: x  Gluc: 204 mg/dL / Ketone: x  / Bili: x / Urobili: x   Blood: x / Protein: x / Nitrite: x   Leuk Esterase: x / RBC: x / WBC x   Sq Epi: x / Non Sq Epi: x / Bacteria: x          RADIOLOGY & ADDITIONAL STUDIES REVIEWED:  ***    [ ]GOALS OF CARE DISCUSSION WITH PATIENT/FAMILY/PROXY:    CRITICAL CARE TIME SPENT: 35 minutes

## 2023-06-25 NOTE — PROGRESS NOTE ADULT - ASSESSMENT
Discussed importance of compliance with ocular meds and follow up exams to prevent loss of vision. Patient is a 77y Male with HTN , HLD, Asthma p/w abd pain a/w SBO s/p OR 6/17 dx lap converted to ex lap LOU, SBRx2 with ileostomy creation due to spillage. Pt with Septic shock, hypotension on pressors with GA and hyperkalemia. Nephrology consulted for Elevated serum creatinine.    1. GA likely 2/2 ATN in the setting of septic shock. c/w with pressors to help renal perfusion. Pt with oliguric GA with worsening renal function/ fluid overload, for which pt was initiated on HD 6/22. Last HD on 6/24 with intradialytic hypotension for which UF goal reduced to 1.5L. Plan for additional HD 6/26 as patient remains oliguric.  Prior intra-abdominal pressure was not significantly elevated, but if distention worsens, would again check intraabdominal pressure abdominal compression.  Strict I/Os. Avoid nephrotoxins/ NSAIDs/ RCA. Monitor BMP.    2. Volume overload- increase UF as tolerated with HD.  Monitor urine o/p    3. Hyperkalemia- resolved with medical management. Last serum K wnl. Monitor serum potassium    4. Acidosis- serum CO2 improving. Metabolic acidosis resolved; repeat serum lactate.  s/p bicarbonate pushes. Vent support as per ICU. Monitor serum Co2/ph    5. Septic Shock- antibiotics and pressors per ICU team. c/w pressors to maintain renal perfusion    6. 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 No specific w/u at this time.        [facility] NEPHROLOG[facility]  Aneudy Brown M.D.  Raheel Darling D.O.  My Rodriguez M.D.  Nia Limon, PRECIOUS, ANP-C    Telephone: [phone]  Facsimile: [phone]    [address] [place] [address], [address]  [place] [address]

## 2023-06-26 NOTE — PROCEDURE NOTE - NSSITEPREP_SKIN_A_CORE
alcohol/chlorhexidine
chlorhexidine
chlorhexidine/Adherence to aseptic technique: hand hygiene prior to donning barriers (gown, gloves), don cap and mask, sterile drape over patient
chlorhexidine

## 2023-06-26 NOTE — CHART NOTE - NSCHARTNOTEFT_GEN_A_CORE
Called Mariah Jonathan Latham [phone] (the son). Ms Ellsworth Ever picked up the phone. Provided update about worsening status of the patient despite aggressive measures. She said she will make her brother come into the hospital.

## 2023-06-26 NOTE — PROGRESS NOTE ADULT - SUBJECTIVE AND OBJECTIVE BOX
Pt intubated, sedated  on pressors .45 levophed  ICU Vital Signs Last 24 Hrs  T(C): 37.6 (26 Jun 2023 06:45), Max: 37.6 (26 Jun 2023 06:15)  T(F): 99.7 (26 Jun 2023 06:45), Max: 99.7 (26 Jun 2023 06:15)  HR: 106 (26 Jun 2023 08:15) (70 - 106)  BP: 82/47 (26 Jun 2023 06:32) (82/47 - 140/58)  BP(mean): 58 (26 Jun 2023 06:32) (58 - 75)  ABP: 97/40 (26 Jun 2023 06:45) (79/41 - 144/63)  ABP(mean): 55 (26 Jun 2023 06:45) (51 - 92)  RR: 35 (26 Jun 2023 06:45) (20 - 39)  SpO2: 96% (26 Jun 2023 08:15) (87% - 100%)    O2 Parameters below as of 25 Jun 2023 19:15  Patient On (Oxygen Delivery Method): ventilator        Non responsive to verbal stimuli,   ICU Vital Signs Last 24 Hrs  T(C): 37.6 (26 Jun 2023 06:45), Max: 37.6 (26 Jun 2023 06:15)  T(F): 99.7 (26 Jun 2023 06:45), Max: 99.7 (26 Jun 2023 06:15)  HR: 106 (26 Jun 2023 08:15) (70 - 106)  BP: 82/47 (26 Jun 2023 06:32) (82/47 - 140/58)  BP(mean): 58 (26 Jun 2023 06:32) (58 - 75)  ABP: 97/40 (26 Jun 2023 06:45) (79/41 - 144/63)  ABP(mean): 55 (26 Jun 2023 06:45) (51 - 92)  RR: 35 (26 Jun 2023 06:45) (20 - 39)  SpO2: 96% (26 Jun 2023 08:15) (87% - 100%)    O2 Parameters below as of 25 Jun 2023 19:15  Patient On (Oxygen Delivery Method): ventilator    Abd: soft, distended, ostomy: pink,viable, scant stool in bag with small liquid, red rubber cath in place.  wound: approximated with staples, packed between staples with sterile gauze, no purulence  no bleed, fascia intact.                          9.1    13.51 )-----------( 129      ( 26 Jun 2023 04:43 )             27.0   06-26    142  |  105  |  126<H>  ----------------------------<  179<H>  5.0   |  23  |  5.68<H>    Ca    7.8<L>      26 Jun 2023 04:43  Phos  7.9     06-26  Mg     2.8     06-26    TPro  5.5<L>  /  Alb  1.6<L>  /  TBili  1.2  /  DBili  x   /  AST  85<H>  /  ALT  46  /  AlkPhos  67  06-26

## 2023-06-26 NOTE — PROGRESS NOTE ADULT - SUBJECTIVE AND OBJECTIVE BOX
NEUROLOGY FOLLOW-UP NOTE    NAME:  KATJA CANO      ASSESSMENT:  77M with acute encephalopathy, concerning for anoxic brain injury followed by small bowel obstruction complicated by abdominal perforation, without neurodiagnostic evidence of subclinical seizures      RECOMMENDATIONS:    - Repeat CT Head to evaluate for anoxic brain injury, especially in left posterior quadrant in area of focal slowing and periodic discharges on EEG    - Continue management of metabolic abnormalities and infection as per ICU team and ID, Nephrology, and Surgery consultations    - DVT ppx: SCDs, Heparin          NOTE TO BE COMPLETED - PLEASE REFER TO ABOVE ONLY AND IGNORE INFORMATION BELOW    ******************************    HPI:  78 y/o male with PMHx of HTN , HLD, Asthma, no prior abdominal surgeries, complains of severe generalized abdominal pain and vomiting since about 8 PM.  Pain is described as sharp , persistent in the epigastric area with multiple > 4NBNB vomiting . Last BM was yesterday morning with persistent flatus . Denies fever or chills or dysuria. or any other complaints at this time.  (17 Jun 2023 06:53)      NEURO HPI:      INTERVAL HISTORY:      MEDICATIONS:  artificial  tears Solution 1 Drop(s) Both EYES four times a day  chlorhexidine 2% Cloths 1 Application(s) Topical <User Schedule>  heparin  Infusion.  Unit(s)/Hr IV Continuous <Continuous>  insulin lispro (ADMELOG) corrective regimen sliding scale   SubCutaneous every 6 hours  meropenem  IVPB 500 milliGRAM(s) IV Intermittent every 24 hours  norepinephrine Infusion 0.45 MICROgram(s)/kG/Min IV Continuous <Continuous>  pantoprazole  Injectable 40 milliGRAM(s) IV Push daily  sodium chloride 0.9% lock flush 10 milliLiter(s) IV Push every 1 hour PRN  vasopressin Infusion 0.04 Unit(s)/Min IV Continuous <Continuous>      ALLERGIES:  Bactrim (Hives; Rash)      REVIEW OF SYSTEMS:  Fourteen systems reviewed and negative except as in HPI / Interval History.          OBJECTIVE:  Vital Signs Last 24 Hrs  T(C): 37 (26 Jun 2023 13:45), Max: 38.1 (26 Jun 2023 09:45)  T(F): 98.6 (26 Jun 2023 13:45), Max: 100.6 (26 Jun 2023 09:45)  HR: 104 (26 Jun 2023 13:45) (80 - 110)  BP: 77/51 (26 Jun 2023 11:15) (77/51 - 140/58)  BP(mean): 61 (26 Jun 2023 11:15) (58 - 75)  RR: 32 (26 Jun 2023 13:45) (20 - 46)  SpO2: 94% (26 Jun 2023 13:45) (87% - 100%)    Parameters below as of 25 Jun 2023 19:15  Patient On (Oxygen Delivery Method): ventilator        General Examination:  General: No acute distress  HEENT: Atraumatic, Normocephalic  Respiratory: CTA B/l.  No crackles, rhonchi, or wheezes.  Cardiovascular: RRR.  Normal S1 & S2.  Normal b/l radial and pedal pulses.    Neurological Examination:  General / Mental Status: AAO x 3.  No aphasia or dysarthria.  Naming and repetition intact.  Cranial Nerves: VFF x 4.  PERRL.  EOMI x 2, No nystagmus or diplopia.  B/l V1-V3 equal and intact to light touch and pinprick.  Symmetric facial movement and palate elevation.  B/l hearing equal to finger rub.  5/5 strength with b/l sternocleidomastoid and trapezius.  Midline tongue protrusion, with no atrophy or fasciculations.  Motor: Normal bulk & tone in all four extremities.  5/5 strength throughout all four extremities.  No downward drift, rigidity, spasticity, or tremors in any of the four extremities.  Sensory: Intact to light touch and pinprick in all four extremities.  Negative Romberg.  Reflex: 2+ and symmetric at b/l biceps, triceps, brachioradialis, patellae, and ankles.  Downgoing toes b/l.  Coordination: No dysmetria with b/l finger-to-nose and heel raise tests.  Symmetric rapid alternating movements b/l.  Gait: Normal, narrow-based gait.  No difficulty with tiptoe, heel, and tandem gaits.        LABORATORY VALUES:                          9.1    13.51 )-----------( 129      ( 26 Jun 2023 04:43 )             27.0       06-26    x   |  x   |  x   ----------------------------<  x   x    |  x   |  6.23<H>    Ca    7.8<L>      26 Jun 2023 04:43  Phos  >9.0     06-26  Mg     2.8     06-26    TPro  5.1<L>  /  Alb  x   /  TBili  1.4<H>  /  DBili  x   /  AST  172<H>  /  ALT  59  /  AlkPhos  60  06-26      LIVER FUNCTIONS - ( 26 Jun 2023 13:45 )  Alb: x     / Pro: 5.1 g/dL / ALK PHOS: 60 U/L / ALT: 59 U/L DA / AST: 172 U/L / GGT: x             06-25 Chol 62 LDL -- HDL 9<L> Trig 172<H>, 06-19 Chol -- LDL -- HDL -- Trig 851<H>    Glucose Trend  06-26-23 @ 11:29   -  -- -- 196<H>  06-26-23 @ 06:29   -  -- -- 163<H>  06-26-23 @ 04:43   -  179<H> -- --  06-26-23 @ 00:56   -  -- -- 159<H>  06-25-23 @ 17:09   -  -- -- 98  06-25-23 @ 10:16   -  -- -- 123<H>  06-25-23 @ 05:51   -  -- -- 130<H>  06-25-23 @ 03:19   -  189<H> -- --  06-24-23 @ 22:57   -  -- -- 108<H>  06-24-23 @ 16:55   -  -- -- 186<H>                    NEUROIMAGING:          Please contact the Neurology consult service with any neurological questions.      Arpan Santoyo MD   of Neurology  [facility] [employer] [facility] at [facility]         NEUROLOGY FOLLOW-UP NOTE    NAME:  KATJA CANO      ASSESSMENT:  77M with acute encephalopathy, concerning for anoxic brain injury followed by small bowel obstruction complicated by gastric perforation, without neurodiagnostic evidence of subclinical seizures      RECOMMENDATIONS:    - Repeat CT Head to evaluate for anoxic brain injury, especially in left posterior quadrant in area of focal slowing and periodic discharges on EEG    - Continue management of metabolic abnormalities and infection as per ICU team and ID, Nephrology, and Surgery consultations    - DVT ppx: SCDs, Heparin          ******************************    HPI:  78 y/o male with PMHx of HTN , HLD, Asthma, no prior abdominal surgeries, complains of severe generalized abdominal pain and vomiting since about 8 PM.  Pain is described as sharp , persistent in the epigastric area with multiple > 4NBNB vomiting . Last BM was yesterday morning with persistent flatus . Denies fever or chills or dysuria. or any other complaints at this time.  (17 Jun 2023 06:53)      NEURO HPI:  77M admitted for small bowel obstruction complicated by gastric perforation has had persistent encephalopathy.      INTERVAL HISTORY:  The patient continues to be unresponsive today despite being off of sedation.      MEDICATIONS:  artificial  tears Solution 1 Drop(s) Both EYES four times a day  chlorhexidine 2% Cloths 1 Application(s) Topical <User Schedule>  heparin  Infusion.  Unit(s)/Hr IV Continuous <Continuous>  insulin lispro (ADMELOG) corrective regimen sliding scale   SubCutaneous every 6 hours  meropenem  IVPB 500 milliGRAM(s) IV Intermittent every 24 hours  norepinephrine Infusion 0.45 MICROgram(s)/kG/Min IV Continuous <Continuous>  pantoprazole  Injectable 40 milliGRAM(s) IV Push daily  sodium chloride 0.9% lock flush 10 milliLiter(s) IV Push every 1 hour PRN  vasopressin Infusion 0.04 Unit(s)/Min IV Continuous <Continuous>      ALLERGIES:  Bactrim (Hives; Rash)      REVIEW OF SYSTEMS:  Fourteen systems reviewed and negative or unobtainable except as in HPI / Interval History.          OBJECTIVE:  Vital Signs Last 24 Hrs  T(C): 37 (26 Jun 2023 13:45), Max: 38.1 (26 Jun 2023 09:45)  T(F): 98.6 (26 Jun 2023 13:45), Max: 100.6 (26 Jun 2023 09:45)  HR: 104 (26 Jun 2023 13:45) (80 - 110)  BP: 77/51 (26 Jun 2023 11:15) (77/51 - 140/58)  BP(mean): 61 (26 Jun 2023 11:15) (58 - 75)  RR: 32 (26 Jun 2023 13:45) (20 - 46)  SpO2: 94% (26 Jun 2023 13:45) (87% - 100%)  Parameters below as of 25 Jun 2023 19:15  Patient On (Oxygen Delivery Method): ventilator      General Exam:  General: Unresponsive to voice  Respiratory: Rapid rate, Diminished breath sounds b/l  Cardiovascular: Rapid rate, Regular rhythm, Weak b/l radial and pedal pulses    Neurological Exam:  General / Mental Status: Nonverbal, Does not follow commands.  Neurological exam is limited.  Cranial Nerves: PERRL, Blink to threat absent b/l, Does not track finger movements with eyes b/l.  No response to pinprick at b/l V1-V3 nor to snap at b/l ears.  No apparent facial asymmetry.  Midline palate and tongue.  No resistance to passive motion of neck b/l; no nuchal rigidity.  Motor: Diminished bulk and tone in all four extremities.  All four extremities drop to bed after passive elevation.  No spontaneous movement, rigidity, or spasticity in any of the four extremities.  Sensation: No response to nailbed pressure in any of the four extremities.  Reflexes: 1+ and symmetric at b/l biceps, triceps, brachioradialis, patellae, and ankles.  Toes mute b/l.  Unable to assess coordination, Romberg sign, or gait in unresponsive patient.          LABORATORY VALUES:                          9.1    13.51 )-----------( 129      ( 26 Jun 2023 04:43 )             27.0       06-26    x   |  x   |  x   ----------------------------<  x   x    |  x   |  6.23<H>    Ca    7.8<L>      26 Jun 2023 04:43  Phos  >9.0     06-26  Mg     2.8     06-26    TPro  5.1<L>  /  Alb  x   /  TBili  1.4<H>  /  DBili  x   /  AST  172<H>  /  ALT  59  /  AlkPhos  60  06-26 06-25 Chol 62 LDL 19<L> HDL 9<L> Trig 172<H>  06-19 Trig 851<H>    Glucose Trend  06-26-23 @ 11:29   -  -- -- 196<H>  06-26-23 @ 06:29   -  -- -- 163<H>  06-26-23 @ 04:43   -  179<H> -- --  06-26-23 @ 00:56   -  -- -- 159<H>  06-25-23 @ 17:09   -  -- -- 98  06-25-23 @ 10:16   -  -- -- 123<H>  06-25-23 @ 05:51   -  -- -- 130<H>  06-25-23 @ 03:19   -  189<H> -- --  06-24-23 @ 22:57   -  -- -- 108<H>  06-24-23 @ 16:55   -  -- -- 186<H>          NEUROIMAGING:      CT Head (6/23/23):  - No acute intracranial abnormality      Routine EEG (6/26/23):  - Frequent left posterior quadrant lateralized periodic discharges  - Left posterior quadrant focal slowing  - Severe generalized background slowing          Please contact the Neurology consult service with any neurological questions.      Arpan Santoyo MD   of Neurology  [facility] [employer] [facility]

## 2023-06-26 NOTE — PROCEDURE NOTE - NSCVLATTEMPTSITEVASC_A_CORE
left
right/femoral vein
right/internal jugular
right/internal jugular
left/femoral vein
left/subclavian vein

## 2023-06-26 NOTE — PROCEDURE NOTE - NSINFORMCONSENT_GEN_A_CORE
Benefits, risks, and possible complications of procedure explained to patient/caregiver who verbalized understanding and gave written consent.
This was an emergent procedure.
This was an emergent procedure.
Benefits, risks, and possible complications of procedure explained to patient/caregiver who verbalized understanding and gave written consent.

## 2023-06-26 NOTE — PROCEDURE NOTE - NSPROCNAME_GEN_A_CORE
Central Line Insertion
Point of Care Ultrasound DVT
Central Line Insertion
Central Line Insertion
Arterial Puncture/Cannulation
Central Line Insertion

## 2023-06-26 NOTE — PROCEDURE NOTE - NSANESTHESIA_GEN_A_CORE
2% lidocaine
1% lidocaine
2% lidocaine
1% lidocaine
Propofol and Fentanyl on mechanical ventilation

## 2023-06-26 NOTE — PROGRESS NOTE ADULT - SUBJECTIVE AND OBJECTIVE BOX
ICU visit:  77y Male is under our care for    REVIEW OF SYSTEMS:  [  ] Not able to elicit      MEDS:  meropenem  IVPB 500 milliGRAM(s) IV Intermittent every 24 hours    ALLERGIES: Allergies    Bactrim (Hives; Rash)    Intolerances        VITALS:  ICU Vital Signs Last 24 Hrs  T(C): 36 (26 Jun 2023 12:15), Max: 38.1 (26 Jun 2023 09:45)  T(F): 96.8 (26 Jun 2023 12:15), Max: 100.6 (26 Jun 2023 09:45)  HR: 104 (26 Jun 2023 12:15) (80 - 110)  BP: 77/51 (26 Jun 2023 11:15) (77/51 - 140/58)  BP(mean): 61 (26 Jun 2023 11:15) (58 - 75)  ABP: 109/59 (26 Jun 2023 12:15) (79/41 - 126/52)  ABP(mean): 73 (26 Jun 2023 12:15) (51 - 82)  RR: 28 (26 Jun 2023 12:15) (20 - 45)  SpO2: 92% (26 Jun 2023 12:15) (87% - 98%)    O2 Parameters below as of 25 Jun 2023 19:15  Patient On (Oxygen Delivery Method): ventilator            PHYSICAL EXAM:      LABS/DIAGNOSTIC TESTS:                        9.1    13.51 )-----------( 129      ( 26 Jun 2023 04:43 )             27.0     WBC Count: 13.51 K/uL (06-26 @ 04:43)  WBC Count: 11.43 K/uL (06-25 @ 03:19)  WBC Count: 13.62 K/uL (06-24 @ 03:15)  WBC Count: 13.96 K/uL (06-23 @ 17:40)  WBC Count: 9.38 K/uL (06-23 @ 03:48)    06-26    142  |  105  |  126<H>  ----------------------------<  179<H>  5.0   |  23  |  5.68<H>    Ca    7.8<L>      26 Jun 2023 04:43  Phos  7.9     06-26  Mg     2.8     06-26    TPro  5.5<L>  /  Alb  1.6<L>  /  TBili  1.2  /  DBili  x   /  AST  85<H>  /  ALT  46  /  AlkPhos  67  06-26        ABG - ( 26 Jun 2023 03:23 )  pH: 7.41  /  pCO2: 36    /  pO2: 76    / HCO3: 23    / Base Excess: -1.4  /  SaO2: 96                CULTURES:   .Blood Blood-Peripheral  06-18 @ 16:35   No Growth Final  --  --      .Blood Blood-Peripheral  06-18 @ 16:30   No Growth Final  --  --      Clean Catch Clean Catch (Midstream)  06-17 @ 04:52   10,000 - 49,000 CFU/mL Enterococcus faecalis  <10,000 CFU/ml Normal Urogenital aure present  --  Enterococcus faecalis        RADIOLOGY:  no new studies ICU visit:  77y Male is under our care for peritonitis s/p ex lap with small bowel resection.  Patient was seen in he ICU intubated and vent dependent with an FIO2 of 35% and remains on one pressor.  He had a fever of 100.6 earlier this morning with slightly increased WBC count.    REVIEW OF SYSTEMS:  [ x ] Not able to elicit      MEDS:  meropenem  IVPB 500 milliGRAM(s) IV Intermittent every 24 hours    ALLERGIES: Allergies    Bactrim (Hives; Rash)    Intolerances        VITALS:  ICU Vital Signs Last 24 Hrs  T(C): 36 (26 Jun 2023 12:15), Max: 38.1 (26 Jun 2023 09:45)  T(F): 96.8 (26 Jun 2023 12:15), Max: 100.6 (26 Jun 2023 09:45)  HR: 104 (26 Jun 2023 12:15) (80 - 110)  BP: 77/51 (26 Jun 2023 11:15) (77/51 - 140/58)  BP(mean): 61 (26 Jun 2023 11:15) (58 - 75)  ABP: 109/59 (26 Jun 2023 12:15) (79/41 - 126/52)  ABP(mean): 73 (26 Jun 2023 12:15) (51 - 82)  RR: 28 (26 Jun 2023 12:15) (20 - 45)  SpO2: 92% (26 Jun 2023 12:15) (87% - 98%)    O2 Parameters below as of 25 Jun 2023 19:15  Patient On (Oxygen Delivery Method): ventilator            PHYSICAL EXAM:  HEENT: ETT, Right NG  Neck: supple no LN's   Respiratory: lungs clear no rales  Cardiovascular: S1 S2 reg no murmurs  Gastrointestinal: +BS with soft, distended abdomen, RLQ ileostomy  : Naik catheter in place with yellow urine  Extremities: no edema  Skin: no rashes  Ortho: n/a  Neuro: Intubated       LABS/DIAGNOSTIC TESTS:                        9.1    13.51 )-----------( 129      ( 26 Jun 2023 04:43 )             27.0     WBC Count: 13.51 K/uL (06-26 @ 04:43)  WBC Count: 11.43 K/uL (06-25 @ 03:19)  WBC Count: 13.62 K/uL (06-24 @ 03:15)  WBC Count: 13.96 K/uL (06-23 @ 17:40)  WBC Count: 9.38 K/uL (06-23 @ 03:48)    06-26    142  |  105  |  126<H>  ----------------------------<  179<H>  5.0   |  23  |  5.68<H>    Ca    7.8<L>      26 Jun 2023 04:43  Phos  7.9     06-26  Mg     2.8     06-26    TPro  5.5<L>  /  Alb  1.6<L>  /  TBili  1.2  /  DBili  x   /  AST  85<H>  /  ALT  46  /  AlkPhos  67  06-26        ABG - ( 26 Jun 2023 03:23 )  pH: 7.41  /  pCO2: 36    /  pO2: 76    / HCO3: 23    / Base Excess: -1.4  /  SaO2: 96                CULTURES:   .Blood Blood-Peripheral  06-18 @ 16:35   No Growth Final  --  --      .Blood Blood-Peripheral  06-18 @ 16:30   No Growth Final  --  --      Clean Catch Clean Catch (Midstream)  06-17 @ 04:52   10,000 - 49,000 CFU/mL Enterococcus faecalis  <10,000 CFU/ml Normal Urogenital aure present  --  Enterococcus faecalis        RADIOLOGY:  no new studies ICU visit:  77y Male is under our care for peritonitis s/p ex lap with small bowel resection.  Patient was seen in he ICU intubated and vent dependent with an FIO2 of 35% and remains on one pressor.  He had a fever of 100.6 earlier this morning with slightly increased WBC count. He remains off sedation and is minimally responsive.    REVIEW OF SYSTEMS:  [ x ] Not able to elicit      MEDS:  meropenem  IVPB 500 milliGRAM(s) IV Intermittent every 24 hours    ALLERGIES: Allergies    Bactrim (Hives; Rash)    Intolerances        VITALS:  ICU Vital Signs Last 24 Hrs  T(C): 36 (26 Jun 2023 12:15), Max: 38.1 (26 Jun 2023 09:45)  T(F): 96.8 (26 Jun 2023 12:15), Max: 100.6 (26 Jun 2023 09:45)  HR: 104 (26 Jun 2023 12:15) (80 - 110)  BP: 77/51 (26 Jun 2023 11:15) (77/51 - 140/58)  BP(mean): 61 (26 Jun 2023 11:15) (58 - 75)  ABP: 109/59 (26 Jun 2023 12:15) (79/41 - 126/52)  ABP(mean): 73 (26 Jun 2023 12:15) (51 - 82)  RR: 28 (26 Jun 2023 12:15) (20 - 45)  SpO2: 92% (26 Jun 2023 12:15) (87% - 98%)    O2 Parameters below as of 25 Jun 2023 19:15  Patient On (Oxygen Delivery Method): ventilator            PHYSICAL EXAM:  HEENT: ETT, Right NG  Neck: supple no LN's   Respiratory: lungs clear no rales  Cardiovascular: S1 S2 reg no murmurs  Gastrointestinal: +BS with soft, distended abdomen, RLQ ileostomy  : Naik catheter in place with yellow urine  Extremities: no edema  Skin: no rashes  Ortho: n/a  Neuro: Intubated       LABS/DIAGNOSTIC TESTS:                        9.1    13.51 )-----------( 129      ( 26 Jun 2023 04:43 )             27.0     WBC Count: 13.51 K/uL (06-26 @ 04:43)  WBC Count: 11.43 K/uL (06-25 @ 03:19)  WBC Count: 13.62 K/uL (06-24 @ 03:15)  WBC Count: 13.96 K/uL (06-23 @ 17:40)  WBC Count: 9.38 K/uL (06-23 @ 03:48)    06-26    142  |  105  |  126<H>  ----------------------------<  179<H>  5.0   |  23  |  5.68<H>    Ca    7.8<L>      26 Jun 2023 04:43  Phos  7.9     06-26  Mg     2.8     06-26    TPro  5.5<L>  /  Alb  1.6<L>  /  TBili  1.2  /  DBili  x   /  AST  85<H>  /  ALT  46  /  AlkPhos  67  06-26        ABG - ( 26 Jun 2023 03:23 )  pH: 7.41  /  pCO2: 36    /  pO2: 76    / HCO3: 23    / Base Excess: -1.4  /  SaO2: 96                CULTURES:   .Blood Blood-Peripheral  06-18 @ 16:35   No Growth Final  --  --      .Blood Blood-Peripheral  06-18 @ 16:30   No Growth Final  --  --      Clean Catch Clean Catch (Midstream)  06-17 @ 04:52   10,000 - 49,000 CFU/mL Enterococcus faecalis  <10,000 CFU/ml Normal Urogenital aure present  --  Enterococcus faecalis        RADIOLOGY:  no new studies

## 2023-06-26 NOTE — PROGRESS NOTE ADULT - SUBJECTIVE AND OBJECTIVE BOX
INTERVAL HPI/OVERNIGHT EVENTS: ***    PRESSORS: [ ] YES [ ] NO  WHICH:    Antimicrobial:  meropenem  IVPB 500 milliGRAM(s) IV Intermittent every 24 hours    Cardiovascular:  norepinephrine Infusion 0.45 MICROgram(s)/kG/Min IV Continuous <Continuous>    Pulmonary:    Hematalogic:  heparin   Injectable 3000 Unit(s) IV Push every 6 hours PRN  heparin   Injectable 6500 Unit(s) IV Push every 6 hours PRN  heparin  Infusion.  Unit(s)/Hr IV Continuous <Continuous>    Other:  artificial  tears Solution 1 Drop(s) Both EYES four times a day  chlorhexidine 2% Cloths 1 Application(s) Topical <User Schedule>  insulin lispro (ADMELOG) corrective regimen sliding scale   SubCutaneous every 6 hours  pantoprazole  Injectable 40 milliGRAM(s) IV Push daily  sodium chloride 0.9% lock flush 10 milliLiter(s) IV Push every 1 hour PRN    artificial  tears Solution 1 Drop(s) Both EYES four times a day  chlorhexidine 2% Cloths 1 Application(s) Topical <User Schedule>  heparin   Injectable 3000 Unit(s) IV Push every 6 hours PRN  heparin   Injectable 6500 Unit(s) IV Push every 6 hours PRN  heparin  Infusion.  Unit(s)/Hr IV Continuous <Continuous>  insulin lispro (ADMELOG) corrective regimen sliding scale   SubCutaneous every 6 hours  meropenem  IVPB 500 milliGRAM(s) IV Intermittent every 24 hours  norepinephrine Infusion 0.45 MICROgram(s)/kG/Min IV Continuous <Continuous>  pantoprazole  Injectable 40 milliGRAM(s) IV Push daily  sodium chloride 0.9% lock flush 10 milliLiter(s) IV Push every 1 hour PRN    Drug Dosing Weight  Height (cm): 170 (17 Jun 2023 01:51)  Weight (kg): 81 (17 Jun 2023 19:13)  BMI (kg/m2): 28 (17 Jun 2023 19:13)  BSA (m2): 1.93 (17 Jun 2023 19:13)    CENTRAL LINE: [ ] YES [ ] NO  LOCATION:   DATE INSERTED:  REMOVE: [ ] YES [ ] NO  EXPLAIN:    [facility]WILLIAMS: [ ] YES [ ] NO    DATE INSERTED:  REMOVE:  [ ] YES [ ] NO  EXPLAIN:    A-LINE:  [ ] YES [ ] NO  LOCATION:   DATE INSERTED:  REMOVE:  [ ] YES [ ] NO  EXPLAIN:    PMH -reviewed admission note, no change since admission  PAST MEDICAL & SURGICAL HISTORY:  Hypertension      History of asthma      History of high cholesterol      No significant past surgical history          ICU Vital Signs Last 24 Hrs  T(C): 37.6 (26 Jun 2023 06:45), Max: 37.6 (26 Jun 2023 06:15)  T(F): 99.7 (26 Jun 2023 06:45), Max: 99.7 (26 Jun 2023 06:15)  HR: 99 (26 Jun 2023 06:45) (67 - 106)  BP: 82/47 (26 Jun 2023 06:32) (82/47 - 140/58)  BP(mean): 58 (26 Jun 2023 06:32) (58 - 77)  ABP: 97/40 (26 Jun 2023 06:45) (79/41 - 144/63)  ABP(mean): 55 (26 Jun 2023 06:45) (51 - 92)  RR: 35 (26 Jun 2023 06:45) (20 - 39)  SpO2: 91% (26 Jun 2023 06:45) (87% - 100%)    O2 Parameters below as of 25 Jun 2023 19:15  Patient On (Oxygen Delivery Method): ventilator            ABG - ( 26 Jun 2023 03:23 )  pH, Arterial: 7.41  pH, Blood: x     /  pCO2: 36    /  pO2: 76    / HCO3: 23    / Base Excess: -1.4  /  SaO2: 96                    06-25 @ 07:01  -  06-26 @ 07:00  --------------------------------------------------------  IN: 969 mL / OUT: 500 mL / NET: 469 mL        Mode: AC/ CMV (Assist Control/ Continuous Mandatory Ventilation)  RR (machine): 22  TV (machine): 400  FiO2: 30  PEEP: 5  ITime: 1  MAP: 15  PIP: 32      PHYSICAL EXAM:    GENERAL: NAD, well-groomed, well-developed  HEAD:  Atraumatic, Normocephalic  EYES: EOMI, PERRLA, conjunctiva and sclera clear  ENMT: No tonsillar erythema, exudates, or enlargement; Moist mucous membranes, Good dentition, No lesions  NECK: Supple, normal appearance, No JVD; Normal thyroid; Trachea midline  NERVOUS SYSTEM:  Alert & Oriented X3,  Motor Strength 5/5 B/L upper and lower extremities; DTRs 2+ intact and symmetric  CHEST/LUNG: No chest deformity; Normal percussion bilaterally; No rales, rhonchi, wheezing   HEART: Regular rate and rhythm; No murmurs, rubs, or gallops  ABDOMEN: Soft, Nontender, Nondistended; Bowel sounds present  EXTREMITIES:  2+ Peripheral Pulses, No clubbing, cyanosis, or edema  LYMPH: No lymphadenopathy noted  SKIN: No rashes or lesions;  Good capillary refill      LABS:  CBC Full  -  ( 26 Jun 2023 04:43 )  WBC Count : x  RBC Count : 3.14 M/uL  Hemoglobin : 9.1 g/dL  Hematocrit : 27.0 %  Platelet Count - Automated : 129 K/uL  Mean Cell Volume : 86.0 fl  Mean Cell Hemoglobin : 29.0 pg  Mean Cell Hemoglobin Concentration : 33.7 gm/dL  Auto Neutrophil # : x  Auto Lymphocyte # : x  Auto Monocyte # : x  Auto Eosinophil # : x  Auto Basophil # : x  Auto Neutrophil % : x  Auto Lymphocyte % : x  Auto Monocyte % : x  Auto Eosinophil % : x  Auto Basophil % : x    06-26    142  |  105  |  126<H>  ----------------------------<  179<H>  5.0   |  23  |  5.68<H>    Ca    7.8<L>      26 Jun 2023 04:43  Phos  7.9     06-26  Mg     2.8     06-26    TPro  5.5<L>  /  Alb  1.6<L>  /  TBili  1.2  /  DBili  x   /  AST  85<H>  /  ALT  46  /  AlkPhos  67  06-26    PT/INR - ( 26 Jun 2023 04:43 )   PT: 18.6 sec;   INR: 1.56 ratio         PTT - ( 26 Jun 2023 06:47 )  PTT:58.1 sec  Urinalysis Basic - ( 26 Jun 2023 04:43 )    Color: x / Appearance: x / SG: x / pH: x  Gluc: 179 mg/dL / Ketone: x  / Bili: x / Urobili: x   Blood: x / Protein: x / Nitrite: x   Leuk Esterase: x / RBC: x / WBC x   Sq Epi: x / Non Sq Epi: x / Bacteria: x          RADIOLOGY & ADDITIONAL STUDIES REVIEWED:  ***    [ ]GOALS OF CARE DISCUSSION WITH PATIENT/FAMILY/PROXY:    CRITICAL CARE TIME SPENT: 35 minutes INTERVAL HPI/OVERNIGHT EVENTS: Overnight, pt was hypotensive, increasing levo requirements. not on vaso. This AM, found to be febrile to 100.6. Will replace lines today,   d/c langley today and straight cath. No HD today, next session tomorrow.     PRESSORS: [x ] YES [ ] NO  WHICH: levo 0.5     Antimicrobial:  meropenem  IVPB 500 milliGRAM(s) IV Intermittent every 24 hours    Cardiovascular:  norepinephrine Infusion 0.45 MICROgram(s)/kG/Min IV Continuous <Continuous>    Pulmonary:    Hematalogic:  heparin   Injectable 3000 Unit(s) IV Push every 6 hours PRN  heparin   Injectable 6500 Unit(s) IV Push every 6 hours PRN  heparin  Infusion.  Unit(s)/Hr IV Continuous <Continuous>    Other:  artificial  tears Solution 1 Drop(s) Both EYES four times a day  chlorhexidine 2% Cloths 1 Application(s) Topical <User Schedule>  insulin lispro (ADMELOG) corrective regimen sliding scale   SubCutaneous every 6 hours  pantoprazole  Injectable 40 milliGRAM(s) IV Push daily  sodium chloride 0.9% lock flush 10 milliLiter(s) IV Push every 1 hour PRN    artificial  tears Solution 1 Drop(s) Both EYES four times a day  chlorhexidine 2% Cloths 1 Application(s) Topical <User Schedule>  heparin   Injectable 3000 Unit(s) IV Push every 6 hours PRN  heparin   Injectable 6500 Unit(s) IV Push every 6 hours PRN  heparin  Infusion.  Unit(s)/Hr IV Continuous <Continuous>  insulin lispro (ADMELOG) corrective regimen sliding scale   SubCutaneous every 6 hours  meropenem  IVPB 500 milliGRAM(s) IV Intermittent every 24 hours  norepinephrine Infusion 0.45 MICROgram(s)/kG/Min IV Continuous <Continuous>  pantoprazole  Injectable 40 milliGRAM(s) IV Push daily  sodium chloride 0.9% lock flush 10 milliLiter(s) IV Push every 1 hour PRN    Drug Dosing Weight  Height (cm): 170 (17 Jun 2023 01:51)  Weight (kg): 81 (17 Jun 2023 19:13)  BMI (kg/m2): 28 (17 Jun 2023 19:13)  BSA (m2): 1.93 (17 Jun 2023 19:13)    CENTRAL LINE: x[ ] YES [ ] NO  LOCATION:   DATE INSERTED: 6/20  REMOVE: [x ] YES [ ] NO  EXPLAIN: switch to subclavian line, concern for line infection    [place]: [x ] YES [ ] NO    DATE INSERTED:  REMOVE:  [x ] YES [ ] NO  EXPLAIN: concern for infection    A-LINE:  [x ] YES [ ] NO  LOCATION:   DATE INSERTED: 6/18  REMOVE:  [x ] YES [ ] NO  EXPLAIN: concern for line infection     PMH -reviewed admission note, no change since admission  PAST MEDICAL & SURGICAL HISTORY:  Hypertension      History of asthma      History of high cholesterol      No significant past surgical history          ICU Vital Signs Last 24 Hrs  T(C): 37.6 (26 Jun 2023 06:45), Max: 37.6 (26 Jun 2023 06:15)  T(F): 99.7 (26 Jun 2023 06:45), Max: 99.7 (26 Jun 2023 06:15)  HR: 99 (26 Jun 2023 06:45) (67 - 106)  BP: 82/47 (26 Jun 2023 06:32) (82/47 - 140/58)  BP(mean): 58 (26 Jun 2023 06:32) (58 - 77)  ABP: 97/40 (26 Jun 2023 06:45) (79/41 - 144/63)  ABP(mean): 55 (26 Jun 2023 06:45) (51 - 92)  RR: 35 (26 Jun 2023 06:45) (20 - 39)  SpO2: 91% (26 Jun 2023 06:45) (87% - 100%)    O2 Parameters below as of 25 Jun 2023 19:15  Patient On (Oxygen Delivery Method): ventilator            ABG - ( 26 Jun 2023 03:23 )  pH, Arterial: 7.41  pH, Blood: x     /  pCO2: 36    /  pO2: 76    / HCO3: 23    / Base Excess: -1.4  /  SaO2: 96                    06-25 @ 07:01  -  06-26 @ 07:00  --------------------------------------------------------  IN: 969 mL / OUT: 500 mL / NET: 469 mL        Mode: AC/ CMV (Assist Control/ Continuous Mandatory Ventilation)  RR (machine): 22  TV (machine): 400  FiO2: 30  PEEP: 5  ITime: 1  MAP: 15  PIP: 32      PHYSICAL EXAM:  GENERAL: NAD, intubated , not sedated   HEAD:  Atraumatic, Normocephalic  EYES: EOMI, PERRLA,  pupils 1mm  ENMT: No tonsillar erythema, exudates, or enlargement; Moist mucous membranes, Good dentition, No lesions; Sump tube connected to suction draining biliary fluid  NECK: ETT in place  NERVOUS SYSTEM:  No longer on sedation, +withdrawal to noxious stimuli, starting to have grimacing and bitting on tube, likely involuntary movements   CHEST/LUNG: No chest deformity; Normal percussion bilaterally; No rales, rhonchi, wheezing   HEART: Regular rate and rhythm; No murmurs, rubs, or gallops  ABDOMEN: Soft, Nontender, distended; Bowel sounds present, (+) ileostomy in place draining 30cc of fluid, surgical scar w/ clean dressing.    : langley catheter in place, draining scant amount of urine  EXTREMITIES:  2+ Peripheral Pulses, No clubbing, cyanosis, (+) upper and lower extremity edema, scrotal edema improving   LYMPH: No lymphadenopathy noted  SKIN: No rashes or lesions;  Good capillary refill        LABS:  CBC Full  -  ( 26 Jun 2023 04:43 )  WBC Count : x  RBC Count : 3.14 M/uL  Hemoglobin : 9.1 g/dL  Hematocrit : 27.0 %  Platelet Count - Automated : 129 K/uL  Mean Cell Volume : 86.0 fl  Mean Cell Hemoglobin : 29.0 pg  Mean Cell Hemoglobin Concentration : 33.7 gm/dL  Auto Neutrophil # : x  Auto Lymphocyte # : x  Auto Monocyte # : x  Auto Eosinophil # : x  Auto Basophil # : x  Auto Neutrophil % : x  Auto Lymphocyte % : x  Auto Monocyte % : x  Auto Eosinophil % : x  Auto Basophil % : x    06-26    142  |  105  |  126<H>  ----------------------------<  179<H>  5.0   |  23  |  5.68<H>    Ca    7.8<L>      26 Jun 2023 04:43  Phos  7.9     06-26  Mg     2.8     06-26    TPro  5.5<L>  /  Alb  1.6<L>  /  TBili  1.2  /  DBili  x   /  AST  85<H>  /  ALT  46  /  AlkPhos  67  06-26    PT/INR - ( 26 Jun 2023 04:43 )   PT: 18.6 sec;   INR: 1.56 ratio         PTT - ( 26 Jun 2023 06:47 )  PTT:58.1 sec  Urinalysis Basic - ( 26 Jun 2023 04:43 )    Color: x / Appearance: x / SG: x / pH: x  Gluc: 179 mg/dL / Ketone: x  / Bili: x / Urobili: x   Blood: x / Protein: x / Nitrite: x   Leuk Esterase: x / RBC: x / WBC x   Sq Epi: x / Non Sq Epi: x / Bacteria: x          RADIOLOGY & ADDITIONAL STUDIES REVIEWED:  ***    [ ]GOALS OF CARE DISCUSSION WITH PATIENT/FAMILY/PROXY:    CRITICAL CARE TIME SPENT: 35 minutes

## 2023-06-26 NOTE — PROGRESS NOTE ADULT - ASSESSMENT
Patient is a 77y Male with HTN , HLD, Asthma p/w abd pain a/w SBO s/p OR 6/17 dx lap converted to ex lap LOU, SBRx2 with ileostomy creation due to spillage. Pt with Septic shock, hypotension on pressors with GA and hyperkalemia. Nephrology consulted for Elevated serum creatinine.    1. GA likely 2/2 ATN in the setting of septic shock. c/w with pressors to help renal perfusion. Pt with oliguric GA with worsening renal function/ fluid overload, for which pt was initiated on HD 6/22. Last HD on 6/24 with intradialytic hypotension for which UF goal reduced to 1.5L. Pt with no renal recovery at this time and remains oliguric. Plan for additional HD 6/27c.  Strict I/Os. Avoid nephrotoxins/ NSAIDs/ RCA. Monitor BMP.    2. Volume overload- increase UF as tolerated with HD.  Monitor urine o/p    3. Hyperkalemia- resolved with medical management. Last serum K wnl. Monitor serum potassium    4. Acidosis- serum CO2 improving. Metabolic acidosis resolved; repeat serum lactate.  s/p bicarbonate pushes. Vent support as per ICU. Monitor serum Co2/ph    5. Septic Shock- antibiotics and pressors per ICU team. c/w pressors to maintain renal perfusion    6. Severe PCM- +wt loss with edema and  NPO for 10 days- ICU considering TPN. Pt with persistent fevers, will consider TPN if cleared by ID.   Consider repeat BCx.    Check hgbA1C in am.   Last triglycerides 172. Check CMP/Mg/Phos/ Ionized calcium in am.   Daily weights. Strict I/O.         [facility]  Aneudy Brown M.D.  Raheel Darling D.O.  My Rodriguez M.D.  Nia Limon, PRECIOUS, ANP-C    Telephone: [phone]  Facsimile: [phone], [address]  [place] [address]

## 2023-06-26 NOTE — PROGRESS NOTE ADULT - CRITICAL CARE ATTENDING COMMENT
I counseled the family at bedside and primary team about the role of repeat CT Head to help identify the presence of anoxic brain injury.
I agree with above

## 2023-06-26 NOTE — PROVIDER CONTACT NOTE (CRITICAL VALUE NOTIFICATION) - ACTION/TREATMENT ORDERED:
Hold heparin x1 hr, decrease by 300 units
hold drip 1 hour restart decrease rate by 300/units/hr per nomogram

## 2023-06-26 NOTE — EEG REPORT - NS EEG TEXT BOX
KATJA CANO [id number] 77y (1945)M  Admitting MD: Dr. Jace Concepcion    Study Date: 06-26-23    --------------------------------------------------------------------------------------------------  History:  CC/ HPI Patient is a 77y old  Male who presents with a chief complaint of Sepsis, small bowel obstruction (24 Jun 2023 18:15)    artificial  tears Solution 1 Drop(s) Both EYES four times a day  chlorhexidine 2% Cloths 1 Application(s) Topical <User Schedule>  heparin  Infusion.  Unit(s)/Hr IV Continuous <Continuous>  insulin lispro (ADMELOG) corrective regimen sliding scale   SubCutaneous every 6 hours  meropenem  IVPB 500 milliGRAM(s) IV Intermittent every 24 hours  norepinephrine Infusion 0.45 MICROgram(s)/kG/Min IV Continuous <Continuous>  pantoprazole  Injectable 40 milliGRAM(s) IV Push daily  vasopressin Infusion 0.04 Unit(s)/Min IV Continuous <Continuous>    --------------------------------------------------------------------------------------------------  Study Interpretation:    [[[Abbreviation Key:  PDR=alpha rhythm/posterior dominant rhythm. A-P=anterior posterior gradient.  Amplitude: ‘very low’:<20; ‘low’:20-50; ‘medium’:; ‘high’:>200uV.  Persistence for periodic/rhythmic patterns (% of epoch) ‘rare’:<1%; ‘occasional’:1-10%; ‘frequent’:10-50%; ‘abundant’:50-90%; ‘continuous’:>90%.  Persistence for sporadic discharges: ‘rare’:<1/hr; ‘occasional’:1/min-1/hr; ‘frequent’:>1/min; ‘abundant’:>1/10 sec.  GRDA=generalized rhythmic delta activity; FIRDA=frontal intermittent GRDA; LRDA=lateralized rhythmic delta activity; TIRDA=temporal intermittent rhythmic delta activity;  LPD=PLED=lateralized periodic discharges; GPD=generalized periodic discharges; BiPDs=BiPLEDs=bilateral independent periodic epileptiform discharges; SIRPID=stimulus induced rhythmic, periodic, or ictal appearing discharges; BIRDs=brief potentially ictal rhythmic discharges >4 Hz, lasting .5-10s; PFA (paroxysmal bursts >13 Hz or =8 Hz).  Modifiers: +F=with fast component; +S=with spike component; +R=with rhythmic component.  S-B=burst suppression pattern.  Max=maximal. N1-drowsy; N2-stage II sleep; N3-slow wave sleep. SSS/BETS=small sharp spikes/benign epileptiform transients of sleep. HV=hyperventilation; PS=photic stimulation]]]    FINDINGS:  The background was continuous, and spontaneously variable. No clear PDR or AP gradient. No clear state changes or sleep features.     Background Slowing:  Generalized slowing: Continuous diffuse delta and theta  Focal slowing: Continuous left hemispheric delta, max in the left posterior region    Sleep Background:  -N2 sleep transients were not recorded.    Epileptiform Activity:   There were frequent lateralized periodic epileptiform discharges (LPDs) occurring over the left posterior region, max P3/O1, at frequencies up to 1hz. No clear ictal evolution of the EEG background. No clear clinical correlation.     Events:  No clinical events were recorded.  No seizures were recorded.    Activation Procedures:   -Hyperventilation was not performed.    -Photic stimulation was performed and did not elicit any abnormalities.      Artifacts:  Intermittent myogenic and external motion artifacts were noted.    ECG:  The heart rate on single channel ECG at baseline was predominantly near BPM = 110-120  -----------------------------------------------------------------------------------------------------    EEG Classification / Summary:  Abnormal EEG study    Frequent LPDs from left posterior region up to 1hz  Background slowing, focal, left posterior max  Background slowing, generalized, severe  -----------------------------------------------------------------------------------------------------    Clinical Impression:    Evidence for structural lesion and increased risk for seizures from the left posterior region  Severe diffuse/multifocal cerebral dysfunction, not specific as to etiology  There were no seizures recorded.      This is a prelim report only, pending review with attending prior to finalization.  -------------------------------------------------------------------------------------------------------  [facility] EEG Reading Room Ph#: [phone]  Epilepsy Answering Service after 5PM and before 8:30AM: Ph#: [phone]    Bebeto Gil M.D, epilepsy fellow   KATJA CANO [id number] 77y (1945)M  Admitting MD: Dr. Jace Concepcion    Study Date: 06-26-23    --------------------------------------------------------------------------------------------------  History:  CC/ HPI Patient is a 77y old  Male who presents with a chief complaint of Sepsis, small bowel obstruction (24 Jun 2023 18:15)    artificial  tears Solution 1 Drop(s) Both EYES four times a day  chlorhexidine 2% Cloths 1 Application(s) Topical <User Schedule>  heparin  Infusion.  Unit(s)/Hr IV Continuous <Continuous>  insulin lispro (ADMELOG) corrective regimen sliding scale   SubCutaneous every 6 hours  meropenem  IVPB 500 milliGRAM(s) IV Intermittent every 24 hours  norepinephrine Infusion 0.45 MICROgram(s)/kG/Min IV Continuous <Continuous>  pantoprazole  Injectable 40 milliGRAM(s) IV Push daily  vasopressin Infusion 0.04 Unit(s)/Min IV Continuous <Continuous>    --------------------------------------------------------------------------------------------------  Study Interpretation:    [[[Abbreviation Key:  PDR=alpha rhythm/posterior dominant rhythm. A-P=anterior posterior gradient.  Amplitude: ‘very low’:<20; ‘low’:20-50; ‘medium’:; ‘high’:>200uV.  Persistence for periodic/rhythmic patterns (% of epoch) ‘rare’:<1%; ‘occasional’:1-10%; ‘frequent’:10-50%; ‘abundant’:50-90%; ‘continuous’:>90%.  Persistence for sporadic discharges: ‘rare’:<1/hr; ‘occasional’:1/min-1/hr; ‘frequent’:>1/min; ‘abundant’:>1/10 sec.  GRDA=generalized rhythmic delta activity; FIRDA=frontal intermittent GRDA; LRDA=lateralized rhythmic delta activity; TIRDA=temporal intermittent rhythmic delta activity;  LPD=PLED=lateralized periodic discharges; GPD=generalized periodic discharges; BiPDs=BiPLEDs=bilateral independent periodic epileptiform discharges; SIRPID=stimulus induced rhythmic, periodic, or ictal appearing discharges; BIRDs=brief potentially ictal rhythmic discharges >4 Hz, lasting .5-10s; PFA (paroxysmal bursts >13 Hz or =8 Hz).  Modifiers: +F=with fast component; +S=with spike component; +R=with rhythmic component.  S-B=burst suppression pattern.  Max=maximal. N1-drowsy; N2-stage II sleep; N3-slow wave sleep. SSS/BETS=small sharp spikes/benign epileptiform transients of sleep. HV=hyperventilation; PS=photic stimulation]]]    FINDINGS:  The background was continuous, and spontaneously variable. No clear PDR or AP gradient. No clear state changes or sleep features.     Background Slowing:  Generalized slowing: Continuous diffuse delta and theta  Focal slowing: Continuous left hemispheric delta, max in the left posterior region    Sleep Background:  -N2 sleep transients were not recorded.    Epileptiform Activity:   There were frequent lateralized periodic epileptiform discharges (LPDs) occurring over the left posterior region, max P3/O1, at frequencies up to 1hz. No clear ictal evolution of the EEG background. No clear clinical correlation.     Events:  No clinical events were recorded.  No seizures were recorded.    Activation Procedures:   -Hyperventilation was not performed.    -Photic stimulation was performed and did not elicit any abnormalities.      Artifacts:  Intermittent myogenic and external motion artifacts were noted.    ECG:  The heart rate on single channel ECG at baseline was predominantly near BPM = 110-120  -----------------------------------------------------------------------------------------------------     EEG Classification / Summary:  Abnormal EEG study    Frequent LPDs from left posterior region up to 1hz  Background slowing, focal, left posterior max  Background slowing, generalized, severe  -----------------------------------------------------------------------------------------------------    Clinical Impression:    Evidence for structural lesion and increased risk for seizures from the left posterior region  Severe diffuse/multifocal cerebral dysfunction, not specific as to etiology  There were no seizures recorded.      -------------------------------------------------------------------------------------------------------  [facility] EEG Reading Room Ph#: [phone]  Epilepsy Answering Service after 5PM and before 8:30AM: Ph#: [phone]    Bebeto Gil M.D, epilepsy fellow

## 2023-06-26 NOTE — PROGRESS NOTE ADULT - NS ATTEND AMEND GEN_ALL_CORE FT
Pt w/ mild improvements  Labs generally improving and pressor requirement slowly coming down  Pt's kidney is appropriately responding to the bumex drip  Pt w/ LL consolidation on xray  Issues w/ desaturations    - continue NG and NPO  - continue diuresis though pt is getting close to net even   - continue to wean pressors as tolerated  - took PEEP up to 10, might need to keep him like that today and then attempt weaning PEEP again tomorrow or even waiting till Thursday  - continue abx (appreciate ID assistance)    Gaby Kuhn MD  Attending Physician
Pt w/ worsening pressor requirements since yesterday  Unclear etiology (infectious?)  Pt w/ emesis around NG, NG clogged and once flushed thick material immediately drained  Red rubber in place in stoma w/ 20-30 ccs of liquid brown stool in bag and thick material around the red rubber    abd a little tight feeling  wound c/d/i  pink stoma w/ red rubber in place    Continue to flush NG  HD as tolerated  Agree w/ changing lines and broadening abx  Wean pressors as tolerated    Gaby Kuhn MD  Attending physician
Pt with continued gradual improvement   No acute events overnight    abd soft, mildly distended, non-rigid  ostomy pink and viable, incisions c/d/i w/ staples in place  langley in place w/ clear urine    - continue to wean off pressors as tolerated  - hold on bumex for now as he seems to have diuresed  - continue abx   - continue to wean vent as tolerated    Gaby Kuhn MD  Attending Physician

## 2023-06-26 NOTE — PROGRESS NOTE ADULT - ASSESSMENT
76 y/o male with PMHx of HTN , HLD, Asthma, no prior abdominal surgeries, complains of severe generalized abdominal pain and vomiting since about 8 PM.  Pain is described as sharp , persistent in the epigastric area with multiple > 4NBNB vomiting . Last BM was yesterday morning with persistent flatus. Mild leucocytosis, dehydrated , lactate is 2.1 . CT shows SBO. Pt was taken to the OR for diagnostic laproscopy which was converted to exploratory lap with ileostomy due to spillage. ICU was consulted as pt was requiring pressor support during and post surgery.     #SBO w/ abdominal perforation, spillage of gastric contents   #AHRF 2/2 PNA, ARDS  #Paknaj vs PANKAJ on CKD  #HTN  #HLD  #Asthma      Plan:  Neuro:  Currently intubated, no longer sedated, still not waking up, no purposeful movements or withdrawal to pain  CTH non-con negative for stroke/hemorrhage, possible uremic encephalopathy (BUN improving), metabolic encephalopath  Baseline AOx3  f/u spot EEG to assess for non-convulsive status   Dr. oconnell consulted   started on heparin gtt     Cardiovascular:  #Shock - Septic Shock 2/2 to SBO complicated by abdominal perforation with spillage of gastric contents during surgery  Currently requiring pressor support  Will titrate down as tolerated  Fluid status- worsening urine output,  edema on physical exam    HD 6/22, 6/23, and 6/24, 6/26    #AFib  afib w/ rate to 120-130 initially, improved  continue to monitor     #HTN   Pt has history of HTN on amlodipine.  Will hold in the setting of shock.     Pulmonary:   #Acute Hypoxic Respiratory Failure 2/2 to fluid overload vs. pneumonia vs. ARDS  POCUS exam 6/19- diffuse B-lines, signs of retrocardiac consolidations    P/F ratio: <300, mild-mod hypoxemia, indicative of ARDS   pt. on FiO2 40%, PEEP 8 today, will keep vented due to lack of mental status   pt on meropenem, will cover PNA   SAT/SBT    Infectious Diseases:  #Septic Shock 2/2 to SBO complicated by abdominal perforation with spillage of gastric contents during surgery  s/p exploratory laproscopy  s/p ileostomy - minimal fecal content in ileostomy   NGT in place, started suction 6/24, tube feeds stopped 6/23  Temp spiking on 6/23, no recent fever spikes   CT abdomen pelvis w/ IV and PO contrast - limited oral contrast study since contrast remained in the abdomen. Suggestive of continued SBO, atretic ostomy site, and possible bowel necrosis.  Catheter placed in ostomy site to keep patent  surgery aware, set NGT to suction - drained 900cc   no plans for re-visit to OR  c/w meropenem      Gastrointestinal:  #SBO  s/p exploratory laproscopy.   s/p ileostomy   will keep npo   NGT in place, started suction 6/24, tube feeds stopped 6/23  Temp spiking on 6/23, no recent fever spikes   CT abdomen pelvis w/ IV and PO contrast - limited oral contrast study since contrast remained in the abdomen. Suggestive of continued SBO, atretic ostomy site, and possible bowel necrosis.  -catheter placed to hold ostomy site patent  surgery aware, set NGT to suction - drained 900cc   no plans for re-visit to OR  c/w meropenem      #Transaminitis   slighty uptrend in LFTs  likely in the setting of sepsis    Renal:  #ATN due to sepsis   Pt w/ SCr 2.08 on admission  Baseline SCr - Unknown  Scr 4/76, slight downtrend after 2 HD sessions , BUN downtrending as well   Urine lytes- show intrinsic picture, likely ATN   HD 6/22, 6/23, 6/24, 6/26  hold IVF due to risk of fluid overload  follow BMP daily    #Metabolic Acidosis  ABG 7.39/39/134/24 -------6/25  Metabolic acidosis likely due to septic shock, lactic acidosis   Acidosis improving after adjustment of the vent settings  Nephro Dr. Brown      #Hyperkalemia   Potassium 5.7 > 6.2 > 5.5 > 5.4 > 4.4   resolving     Heme/onc:  #Thrombocytopenia possibly DIC   - Plts downtrending, 92 today (came in with 200s), improving   - possibly in the setting of infection, ITP, DIC   - fibrinogen 902> 116 (lab error) > 810, >362> 353, hapto 219  - platelet count blue top- 58    #Coagulopathy 2/2 sepsis  - LE dopplers - left saphenous vein DVT from groin to knee  - clotted RIJ on 6/22, needed to replace and inject heparin inside the new catheter  - Fibrinogen and D-Dimer elevated   - started on heparin gtt  6/22, stopped and given one dose of eliquis 10mg, then started back on 6/23, still running 6/25   - platelet count improving     Endo:   #HLD  Pt has history of HLD  On Rosuvastatin 10 at home   hold statin for now    Skin/ catheter: -   Fem central Line d/c'd  [facility] [place] 6/22   [facility] central line 6/20  [place] - 6/17-6/18  R Ax lori - 6/18  Caude langley - 6/17    Prophylaxis:   DVT: SCD - Advance to chemical as per surgery.   GI: PPI     Goals of Care: Full Code    Dispo: Will continue to monitor in ICU.    76 y/o male with PMHx of HTN , HLD, Asthma, no prior abdominal surgeries, complains of severe generalized abdominal pain and vomiting since about 8 PM.  Pain is described as sharp , persistent in the epigastric area with multiple > 4NBNB vomiting . Last BM was yesterday morning with persistent flatus. Mild leucocytosis, dehydrated , lactate is 2.1 . CT shows SBO. Pt was taken to the OR for diagnostic laproscopy which was converted to exploratory lap with ileostomy due to spillage. ICU was consulted as pt was requiring pressor support during and post surgery.     #SBO w/ abdominal perforation, spillage of gastric contents   #AHRF 2/2 PNA, ARDS  #Pankaj vs PANKAJ on CKD  #HTN  #HLD  #Asthma      Plan:  Neuro:  Currently intubated, no longer sedated, still not waking up, no purposeful movements or withdrawal to pain  CTH non-con negative for stroke/hemorrhage, possible uremic encephalopathy (BUN improving), metabolic encephalopath  Baseline AOx3  f/u spot EEG to assess for non-convulsive status   Dr. oconnell consulted   c/w heparin gtt     Cardiovascular:  #Shock - Septic Shock 2/2 to SBO complicated by abdominal perforation with spillage of gastric contents during surgery  Currently requiring pressor support lavo and vaso, worsening shock (febrile, hypotensive, tachycardic)  Will titrate down as tolerated  Fluid status- worsening urine output,  edema on physical exam   HD 6/22, 6/23, and 6/24, 6/27    #AFib  afib w/ rate to 120-130 initially, improved  continue to monitor     #HTN   Pt has history of HTN on amlodipine.  Will hold in the setting of shock.     Pulmonary:   #Acute Hypoxic Respiratory Failure 2/2 to fluid overload vs. pneumonia vs. ARDS  POCUS exam 6/19- diffuse B-lines, signs of retrocardiac consolidations    P/F ratio: <300, mild-mod hypoxemia, indicative of ARDS   pt. on FiO2 40%, PEEP 8 today, will keep vented due to lack of mental status   pt on meropenem, and vancomycin added 6/26 to broaden coverage     Infectious Diseases:  #Septic Shock 2/2 to SBO complicated by abdominal perforation with spillage of gastric contents during surgery  s/p exploratory laproscopy  s/p ileostomy - minimal fecal content in ileostomy   NGT in place, started suction 6/24, tube feeds stopped 6/23  Temp spiking on 6/23, no recent fever spikes   CT abdomen pelvis w/ IV and PO contrast - limited oral contrast study since contrast remained in the abdomen. Suggestive of continued SBO, atretic ostomy site, and possible bowel necrosis.  Catheter placed in ostomy site to keep patent  surgery aware, set NGT to suction - drained 900cc   no plans for re-visit to OR  pt on meropenem, and vancomycin added 6/26 to broaden coverage   - possible concern for line infection, will change CVC and lori today     Gastrointestinal:  #SBO  s/p exploratory laproscopy.   s/p ileostomy   will keep npo   NGT in place, started suction 6/24, tube feeds stopped 6/23  Temp spiking on 6/23, no recent fever spikes   CT abdomen pelvis w/ IV and PO contrast - limited oral contrast study since contrast remained in the abdomen. Suggestive of continued SBO, atretic ostomy site, and possible bowel necrosis.  -catheter placed to hold ostomy site patent  surgery aware, set NGT to suction - drained 900cc   no plans for re-visit to OR  c/w meropenem and vancomycin added today       #Transaminitis   slighty uptrend in LFTs  likely in the setting of sepsis    Renal:  #ATN due to sepsis   Pt w/ SCr 2.08 on admission  Baseline SCr - Unknown  Scr 4/76, slight downtrend after 2 HD sessions , BUN downtrending as well   Urine lytes- show intrinsic picture, likely ATN   HD 6/22, 6/23, 6/24, 6/27  hold IVF due to risk of fluid overload  follow BMP daily    #Metabolic Acidosis  ABG 7.41/39/76/23 -------6/26  Metabolic acidosis likely due to septic shock, lactic acidosis   Acidosis improving after adjustment of the vent settings  Nephro Dr. Brown      #Hyperkalemia   Potassium 5.7 > 6.2 > 5.5 > 5.4 > 4.0  resolving     Heme/onc:  #Thrombocytopenia possibly DIC   - Plts downtrending, 129 today (came in with 200s), improving   - possibly in the setting of infection, ITP, DIC   - fibrinogen 902> 116 (lab error) > 810, >362> 353, hapto 219  - platelet count blue top- 58    #Coagulopathy 2/2 sepsis  - LE dopplers - left saphenous vein DVT from groin to knee  - clotted RIJ on 6/22, needed to replace and inject heparin inside the new catheter  - Fibrinogen and D-Dimer elevated   - started on heparin gtt  6/22, stopped and given one dose of eliquis 10mg, then started back on 6/23, still running 6/26   - platelet count improving     Endo:   #HLD  Pt has history of HLD  On Rosuvastatin 10 at home   hold statin for now    Skin/ catheter: -   [facility]   [facility] central line 6/20 > will replace  [place] - 6/17-6/18   R Ax lori - 6/18 > will replace  Caude langley - 6/17    Prophylaxis:   DVT: on heparin gtt for FD DVT tx   GI: PPI     Goals of Care: Full Code    Dispo: Will continue to monitor in ICU.    78 y/o male with PMHx of HTN , HLD, Asthma, no prior abdominal surgeries, complains of severe generalized abdominal pain and vomiting since about 8 PM.  Pain is described as sharp , persistent in the epigastric area with multiple > 4NBNB vomiting . Last BM was yesterday morning with persistent flatus. Mild leucocytosis, dehydrated , lactate is 2.1 . CT shows SBO. Pt was taken to the OR for diagnostic laproscopy which was converted to exploratory lap with ileostomy due to spillage. ICU was consulted as pt was requiring pressor support during and post surgery.     #SBO w/ abdominal perforation, spillage of gastric contents   #AHRF 2/2 PNA, ARDS  #Pankaj vs PANKAJ on CKD  #HTN  #HLD  #Asthma      Plan:  Neuro:  Currently intubated, no longer sedated, still not waking up, no purposeful movements or withdrawal to pain  CTH non-con negative for stroke/hemorrhage, possible uremic encephalopathy (BUN improving), metabolic encephalopath  Baseline AOx3  f/u spot EEG to assess for non-convulsive status   Dr. oconnell consulted   c/w heparin gtt     Cardiovascular:  #Shock - Septic Shock 2/2 to SBO complicated by abdominal perforation with spillage of gastric contents during surgery  Currently requiring pressor support lavo and vaso, worsening shock (febrile, hypotensive, tachycardic)  Will titrate down as tolerated  Fluid status- worsening urine output,  edema on physical exam   HD 6/22, 6/23, and 6/24, 6/27    #AFib  afib w/ rate to 120-130 initially, improved  continue to monitor     #HTN   Pt has history of HTN on amlodipine.  Will hold in the setting of shock.     Pulmonary:   #Acute Hypoxic Respiratory Failure 2/2 to fluid overload vs. pneumonia vs. ARDS  POCUS exam 6/19- diffuse B-lines, signs of retrocardiac consolidations    P/F ratio: <300, mild-mod hypoxemia, indicative of ARDS   pt. on FiO2 40%, PEEP 8 today, will keep vented due to lack of mental status   pt on meropenem, and vancomycin added 6/26 to broaden coverage     Infectious Diseases:  #Septic Shock 2/2 to SBO complicated by abdominal perforation with spillage of gastric contents during surgery  s/p exploratory laproscopy  s/p ileostomy - minimal fecal content in ileostomy   NGT in place, started suction 6/24, tube feeds stopped 6/23  Temp spiking on 6/23, no recent fever spikes   CT abdomen pelvis w/ IV and PO contrast - limited oral contrast study since contrast remained in the abdomen. Suggestive of continued SBO, atretic ostomy site, and possible bowel necrosis.  Catheter placed in ostomy site to keep patent  surgery aware, set NGT to suction - drained 900cc   no plans for re-visit to OR  pt on meropenem, and vancomycin added 6/26 to broaden coverage   - possible concern for line infection, will change CVC and lori today     Gastrointestinal:  #SBO  s/p exploratory laproscopy.   s/p ileostomy   will keep npo   NGT in place, started suction 6/24, tube feeds stopped 6/23  Temp spiking on 6/23, no recent fever spikes   CT abdomen pelvis w/ IV and PO contrast - limited oral contrast study since contrast remained in the abdomen. Suggestive of continued SBO, atretic ostomy site, and possible bowel necrosis.  -catheter placed to hold ostomy site patent  surgery aware, set NGT to suction - drained 900cc   no plans for re-visit to OR  c/w meropenem and vancomycin added today       #Transaminitis   slighty uptrend in LFTs  likely in the setting of sepsis    Renal:  #ATN due to sepsis   Pt w/ SCr 2.08 on admission  Baseline SCr - Unknown  Scr 4/76, slight downtrend after 2 HD sessions , BUN downtrending as well   Urine lytes- show intrinsic picture, likely ATN   HD 6/22, 6/23, 6/24, 6/27  hold IVF due to risk of fluid overload  follow BMP daily    #Metabolic Acidosis  ABG 7.41/39/76/23 -------6/26  Metabolic acidosis likely due to septic shock, lactic acidosis   Acidosis improving after adjustment of the vent settings  Nephro Dr. Brown      #Hyperkalemia   Potassium 5.7 > 6.2 > 5.5 > 5.4 > 4.0  resolving     Heme/onc:  #Thrombocytopenia possibly DIC   - Plts downtrending, 129 today (came in with 200s), improving   - possibly in the setting of infection, ITP, DIC   - fibrinogen 902> 116 (lab error) > 810, >362> 353, hapto 219  - platelet count blue top- 58    #Coagulopathy 2/2 sepsis  - LE dopplers - left saphenous vein DVT from groin to knee  - clotted RIJ on 6/22, needed to replace and inject heparin inside the new catheter  - Fibrinogen and D-Dimer elevated   - started on heparin gtt  6/22, stopped and given one dose of eliquis 10mg, then started back on 6/23, still running 6/26   - platelet count improving     Endo:   #HLD  Pt has history of HLD  On Rosuvastatin 10 at home   hold statin for now    Skin/ catheter: -   [facility]   [facility] central line 6/20 > will replace  [place] - 6/17-6/18   R Ax lori - 6/18 > will replace  Caude langley - 6/17    Prophylaxis:   DVT: on heparin gtt for FD DVT tx   GI: PPI     Goals of Care: DNR    Dispo: Will continue to monitor in ICU.

## 2023-06-26 NOTE — PROCEDURE NOTE - PROCEDURE DATE TIME, MLM
22-Jun-2023 17:13
17-Jun-2023 18:00
18-Jun-2023 07:30
22-Jun-2023 17:50
26-Jun-2023 13:32
22-Jun-2023 14:00
20-Jun-2023 00:00

## 2023-06-26 NOTE — PROCEDURE NOTE - NSINDICATIONS_GEN_A_CORE
dialysis/CRRT
dialysis/CRRT
critical illness
critical illness/venous access
critical patient/monitoring purposes
critical illness/venous access
dialysis/CRRT

## 2023-06-26 NOTE — PROGRESS NOTE ADULT - ASSESSMENT
Septic Shock  Peritonitis  Leukocytosis - improved  Resp failure    Plan:   ·	Cont Meropenem 500mgs iv qd  D5  ·	Cont pressor support    Time spent - 32 mins Septic Shock  Peritonitis  Leukocytosis   Resp failure  Fever    Plan:   ·	Cont Meropenem 500mgs iv qd  D5  ·	Cont pressor support    Time spent - 32 mins Septic Shock  Peritonitis  Leukocytosis   Resp failure  Fever - low grade    Plan:   ·	Cont Meropenem 500mgs iv qd  D5  ·	Cont pressor support    Time spent - 32 mins

## 2023-06-26 NOTE — PROCEDURE NOTE - ADDITIONAL PROCEDURE DETAILS
US guided L fem attempted. Unable to aspirate with needle, suspect clot. Procedure aborted.
US guided L subclavian central line placement. Guidewire confirmed prior to dilation.
Confirmed via bubble and VBG

## 2023-06-26 NOTE — CHART NOTE - NSCHARTNOTEFT_GEN_A_CORE
Assessment:   Patient is a 77y old  Male who presents with a chief complaint of Sepsis, small bowel obstruction (2023 18:15). Follow-up to document . POD #9 ,Exploratory laparotomy with small bowel resection, Lysis of intestinal adhesions , Ileostomy noted Pt continues intubated. trickle TF few hours  into , then stopped as not tolerating. Pt NPO ~9 Days. HD started. Was being considered for PN (Discussed w/ AUBRIE MD, NP and CCM) but now to reconsider tomorrow (re: medical status)/ including ?for clearance by ID.         Diet Prescription: Diet, NPO:   Except Medications (23 @ 12:40)        Daily     Daily Weight in k.7 (2023 08:00)  Weight in k.7 (2023 07:00)  Weight in k.4 (2023 11:10)  Weight in k.5 (2023 08:38)  Weight in k.8 (2023 19:13)  Weight in k.3 (2023 14:30)  Weight in k (2023 08:00)  Weight in k.3 (2023 08:00)  Weight in k.7 (2023 07:00)  Weight in k.6 (2023 07:15)    % Weight Change; I>O, 3+ generalized, 4+ scrotal edema noted    Pertinent Medications: MEDICATIONS  (STANDING):  artificial  tears Solution 1 Drop(s) Both EYES four times a day  calcium gluconate IVPB 2 Gram(s) IV Intermittent once  chlorhexidine 2% Cloths 1 Application(s) Topical <User Schedule>  dextrose 50% Injectable 50 milliLiter(s) IV Push once  heparin  Infusion.  Unit(s)/Hr (15 mL/Hr) IV Continuous <Continuous>  insulin lispro (ADMELOG) corrective regimen sliding scale   SubCutaneous every 6 hours  insulin regular  human recombinant. 10 Unit(s) IV Push once  meropenem  IVPB 500 milliGRAM(s) IV Intermittent every 24 hours  norepinephrine Infusion 0.45 MICROgram(s)/kG/Min (34.2 mL/Hr) IV Continuous <Continuous>  pantoprazole  Injectable 40 milliGRAM(s) IV Push daily  sodium bicarbonate  Injectable 100 milliEquivalent(s) IV Push once  vasopressin Infusion 0.04 Unit(s)/Min (6 mL/Hr) IV Continuous <Continuous>    MEDICATIONS  (PRN):  sodium chloride 0.9% lock flush 10 milliLiter(s) IV Push every 1 hour PRN Pre/post blood products, medications, blood draw, and to maintain line patency    Pertinent Labs:  Na141 mmol/L Glu 229 mg/dL<H> K+ 6.2 mmol/L<HH> Cr  6.23 mg/dL<H>  mg/dL<H>  Phos 8.9 mg/dL<H>  Alb 1.4 g/dL<L>  Chol 62 mg/dL LDL --    HDL 9 mg/dL<L> Trig 172 mg/dL<H>     CAPILLARY BLOOD GLUCOSE      POCT Blood Glucose.: 196 mg/dL (2023 11:29)  POCT Blood Glucose.: 163 mg/dL (2023 06:29)  POCT Blood Glucose.: 159 mg/dL (2023 00:56)  POCT Blood Glucose.: 98 mg/dL (2023 17:09)        Estimated Needs:   [ ] no change since previous assessment  [x} recalculated: to be reassessed if determined to be starting nutrition Support      Previous Nutrition Diagnosis:   [ ] Altered GI function  [ ]Inadequate Oral Intake [ ] Swallowing Difficulty   [ ] Altered nutrition related labs [ ] Increased Nutrient Needs [ ] Overweight/Obesity   [ ] Unintended Weight Loss [ ] Food & Nutrition Related Knowledge Deficit [x ] Malnutrition (moderate PCM)  [ ] Other:     New Nutrition Diagnosis: [x ] PCM (severe) related to acute illness w/altered GI fx/ structure as evidenced by <50% needs met> 5 days, 3+ generalized, 4+ scrotal edema noted      Interventions:   Recommend  [ ] Change Diet To:  [ ] Nutrition Supplement  [x ] Nutrition Support: when medically feasible and as consistent with goals of care   [x ] Other: MD to monitor. RD available.     Monitoring and Evaluation:   [ x ] Tolerance to diet prescription [ x ] weights [ x ] labs[ x ] follow up per protocol  [ ] other:

## 2023-06-26 NOTE — PROCEDURE NOTE - NSPROCDETAILS_GEN_ALL_CORE
location identified, draped/prepped, sterile technique used, needle inserted/introduced/positive blood return obtained via catheter/connected to a pressurized flush line/sutured in place/hemostasis with direct pressure, dressing applied/Seldinger technique/all materials/supplies accounted for at end of procedure
guidewire recovered/lumen(s) aspirated and flushed/sterile dressing applied/sterile technique, catheter placed/ultrasound guidance with use of sterile gel and probe cove

## 2023-06-26 NOTE — PROGRESS NOTE ADULT - ASSESSMENT
pod 9 ,Exploratory laparotomy with small bowel resection, Lysis of intestinal adhesions , Ileostomy  encephalopathy, multiple possible etiologies, CT head recommended by neuro  poor ostomy function, viable ileostomy.  arf, nephrology following  local wound care  appreciate neuro, nephro consult  plan as per icu team

## 2023-06-27 NOTE — PROVIDER CONTACT NOTE (CRITICAL VALUE NOTIFICATION) - TEST AND RESULT REPORTED:
Lactate 12.7
Phosphorus 8.9
Lactate- 6.4  Potassium 6.4
Potassium- 7.0  Calcium- 6.5
Lactate
Lactate 4.5
APTT 133.4
aPTT- greater than 200

## 2023-06-27 NOTE — PROVIDER CONTACT NOTE (CRITICAL VALUE NOTIFICATION) - NAME OF MD/NP/PA/DO NOTIFIED:
Dr. Santizo
Dr. Martínez/ Dr. Contreras
Dr. Abarca
Dr. TIERA Beckford
Jeb
Dr Dennis
Dr. Abarca
Dr. TIERA Beckford

## 2023-06-27 NOTE — PROVIDER CONTACT NOTE (CRITICAL VALUE NOTIFICATION) - PERSON GIVING RESULT:
Ester
LAb
Willie Paz from lab
Clifford Oreilly
Madelaine- BEBETO Sanz
Clifford Denton
Isabel Bustamante from lab
Lab- C Benja

## 2023-06-27 NOTE — PROGRESS NOTE ADULT - PROVIDER SPECIALTY LIST ADULT
Infectious Disease
Infectious Disease
Nephrology
Nephrology
Surgery
Critical Care
Nephrology
Nephrology
Neurology
Critical Care
Critical Care
Infectious Disease
Nephrology
Surgery
Critical Care
Surgery

## 2023-06-27 NOTE — PROGRESS NOTE ADULT - SUBJECTIVE AND OBJECTIVE BOX
[facility] NEPHROLOGY- PROGRESS NOTE    Patient is a 77y Male with HTN , HLD, Asthma p/w abd pain a/w SBO s/p OR 6/17 dx lap converted to ex lap LOU, SBRx2 with ileostomy creation due to spillage. Pt with Septic shock, hypotension on pressors with GA and hyperkalemia. Nephrology consulted for Elevated serum creatinine.      REVIEW OF SYSTEMS: unable to obtain; pt intubated       VITALS:  T(F): 95 (06-27-23 @ 09:00), Max: 100.6 (06-26-23 @ 10:15)  HR: 96 (06-27-23 @ 09:00)  BP: 96/67 (06-26-23 @ 12:02)  RR: 22 (06-27-23 @ 09:00)  SpO2: 100% (06-27-23 @ 08:30)  Wt(kg): --    06-26 @ 07:01  -  06-27 @ 07:00  --------------------------------------------------------  IN: 7973.8 mL / OUT: 3533 mL / NET: 4440.8 mL        PHYSICAL EXAM:  Constitutional: intubated, off sedation  HEENT: +ETT, +icteric  Neck: No JVD  Respiratory: mechanical BS B/L  Cardiovascular: tachy, S1S2  Gastrointestinal: +ostomy R upper abdomen, midline incision, + distended  Extremities: + LE edema b/l/ sacral edema  :  +langley +scrotal edema  Access: Rt IJ non tunneled HD catheter- benign      LABS:  06-27    140  |  99  |  150<H>  ----------------------------<  264<H>  7.4<HH>   |  13<L>  |  6.58<H>    Ca    7.5<L>      27 Jun 2023 05:00  Phos  13.5     06-27  Mg     2.9     06-27    TPro  4.1<L>  /  Alb  1.0<L>  /  TBili  2.8<H>  /  DBili      /  AST  >2000<H>  /  ALT  4732<H>  /  AlkPhos  259<H>  06-27    Creatinine Trend: 6.58 <--, 6.41 <--, 6.35 <--, 6.62 <--, 6.14 <--, 6.23 <--, 5.68 <--, 4.48 <--, 4.76 <--, 4.26 <--, 4.98 <--, 4.52 <--, 5.41 <--, 4.79 <--, 4.70 <--                        8.3    24.99 )-----------( 92       ( 27 Jun 2023 04:20 )             26.2     Urine Studies:  Urinalysis Basic - ( 27 Jun 2023 05:00 )    Color:  / Appearance:  / SG:  / pH:   Gluc: 264 mg/dL / Ketone:   / Bili:  / Urobili:    Blood:  / Protein:  / Nitrite:    Leuk Esterase:  / RBC:  / WBC    Sq Epi:  / Non Sq Epi:  / Bacteria:

## 2023-06-27 NOTE — PROGRESS NOTE ADULT - SUBJECTIVE AND OBJECTIVE BOX
INTERVAL HPI/OVERNIGHT EVENTS: Tmax 38.1, remains intubated on 2 vasopressors, NGT in place.     Vital Signs Last 24 Hrs  T(C): 35.1 (27 Jun 2023 08:15), Max: 38.1 (26 Jun 2023 09:45)  T(F): 95.2 (27 Jun 2023 08:15), Max: 100.6 (26 Jun 2023 09:45)  HR: 98 (27 Jun 2023 08:20) (95 - 135)  BP: 96/67 (26 Jun 2023 12:02) (77/51 - 96/67)  BP(mean): 77 (26 Jun 2023 12:02) (61 - 77)  RR: 31 (27 Jun 2023 08:15) (8 - 46)  SpO2: 100% (27 Jun 2023 08:20) (87% - 100%)    Parameters below as of 26 Jun 2023 16:17  Patient On (Oxygen Delivery Method): nasal cannula      I&O's Detail    26 Jun 2023 07:01  -  27 Jun 2023 07:00  --------------------------------------------------------  IN:    Heparin Infusion: 389 mL    IV PiggyBack: 50 mL    IV PiggyBack: 250 mL    IV PiggyBack: 200 mL    Lactated Ringers Bolus: 2000 mL    Norepinephrine: 354.3 mL    Norepinephrine: 351.8 mL    Norepinephrine: 136.7 mL    Norepinephrine: 182 mL    Norepinephrine: 1502 mL    Other (mL): 600 mL    Other (mL): 500 mL    Sodium Bicarbonate: 300 mL    Sodium Bicarbonate: 1050 mL    Vasopressin: 108 mL  Total IN: 7973.8 mL    OUT:    Nasogastric/Oral tube (mL): 1000 mL    Other (mL): 33 mL    Other (mL): 2500 mL  Total OUT: 3533 mL    Total NET: 4440.8 mL        acetylcysteine IVPB 8 Gram(s) IV Intermittent once  acetylcysteine IVPB 4.1 Gram(s) IV Intermittent once  acetylcysteine IVPB 12 Gram(s) IV Intermittent once  meropenem  IVPB 500 milliGRAM(s) IV Intermittent every 24 hours  pantoprazole  Injectable 40 milliGRAM(s) IV Push daily      Physical Exam:  General: Intubated, sedated  HEENT: NGT with dark/feculent output  Respiratory: Intubated, equal chest rise   Abdomen: distended, midline wound with ischemic changes at wound edges. No drainage noted. Staples in place. Packing/dressing changed at bedside. Ostomy with red rubber and sanguinous/brown output in bag.   : Naik with yellow urine, scrotum edematous   Extremities: LE edema  Lines/Drains/Tubes:     Drains/Tubes:     06-26-23 @ 07:01  -  06-27-23 @ 07:00  --------------------------------------------------------  IN: 7973.8 mL / OUT: 3533 mL / NET: 4440.8 mL        Labs:                        8.3    24.99 )-----------( 92       ( 27 Jun 2023 04:20 )             26.2     06-27    140  |  99  |  150<H>  ----------------------------<  264<H>  7.4<HH>   |  13<L>  |  6.58<H>    Ca    7.5<L>      27 Jun 2023 05:00  Phos  13.5     06-27  Mg     2.9     06-27    TPro  4.1<L>  /  Alb  1.0<L>  /  TBili  2.8<H>  /  DBili  x   /  AST  >2000<H>  /  ALT  4732<H>  /  AlkPhos  259<H>  06-27    PT/INR - ( 26 Jun 2023 04:43 )   PT: 18.6 sec;   INR: 1.56 ratio         PTT - ( 27 Jun 2023 05:00 )  PTT:123.3 sec    RADIOLOGY & ADDITIONAL STUDIES:  < from: CT Abdomen and Pelvis w/ Oral Cont and w/ IV Cont (06.23.23 @ 15:27) >    ACC: [id number] EXAM:  CT ABDOMEN AND PELVIS OC IC   ORDERED BY: MITCHELL HORN     PROCEDURE DATE:  06/23/2023          INTERPRETATION:  CLINICAL INFORMATION: 77 years  Male with re-scan, sbo   w/ abdominal perforation. Status post exploratory laparotomy with lysis   of adhesions, 2 small bowel anastomoses and end ileostomy on 6/17/2023.   Septic shock.    COMPARISON: 6/17/2023    CONTRAST/COMPLICATIONS:  IV Contrast: Omnipaque 350  90 cc administered   10 cc discarded  Oral Contrast: Gastroview  Complications: None reported at time of study completion    PROCEDURE:  CT of the Abdomen and Pelvis was performed.  Sagittal and coronal reformats were performed.    Limitations: Motion artifact. Streak artifact from patient's arms.    FINDINGS:  LOWER CHEST: Developing small bilateral pleural effusions. Right lower   lobe consolidation secondary to atelectasis or pneumonia. Stable   bibasilar fibrotic changes. Right central line tip in the distal right   atrium/intrahepatic IVC.    LIVER: Within normal limits.  BILE DUCTS: Normal caliber.  GALLBLADDER: Cholelithiasis.  SPLEEN: Within normal limits.  PANCREAS: Within normal limits.  ADRENALS: Within normal limits.  KIDNEYS/URETERS: Within normal limits.    BLADDER: Decompressed. Expected intraluminal air.  REPRODUCTIVE ORGANS: Naik balloon inflated in the prostate. Enlarged   prostate.    BOWEL: Dense barium in the stomach creating streak artifact and limiting   evaluation of adjacent structures. Enteric catheter terminating in the   stomach. Moderately distended fluid-filled stomach. Dilated fluid-filled   duodenum and proximal and mid small bowel. Several dilated small bowel   loops in the pelvis demonstrate poor wall enhancement (2:132, 4:42 and   4:44 Left-sided small bowel anastomosis. Right lower abdominal ileostomy   with atretic ileostomy segment. Rectal tube versus rectal probe in situ.  PERITONEUM: Small ascites. No pneumoperitoneum. VESSELS: Atherosclerotic   changes. Patent celiac artery, SMA and KANDACE. No portal venous gas. Filling   defect in the right external iliac vein extending into the common femoral   vein (2:138).  RETROPERITONEUM/LYMPH NODES: No lymphadenopathy.  ABDOMINAL WALL: Midline incision. Right lower abdominal and ileostomy.  BONES: Within normal limits.    IMPRESSION:    Persistent small bowel dilatation suggestive of obstruction. The   ileostomy appears atretic. Correlate clinically for patency.    Findings were discussed with Dr. Gaby Kuhn 6/23/2023 3:55 PM by   Dr. Meghan Spencer with read back confirmation.    Poorly enhancing bowel loops in the mid and lower abdomen may be ischemic.    Right central venous catheter tip in the distal right atrium/intrahepatic   IVC and should be retracted.    Naik balloon inflated in the prostate.    DVT right external iliac vein extending into the right common femoral vein    Findings discussed with NP Bernard Cardenas at 6/23/2023 4:01 PM by Dr. Meghan Spencer with readback.    Right lower lobe consolidation, either atelectasis or pneumonia.        --- End of Report ---        < end of copied text >

## 2023-06-27 NOTE — PROGRESS NOTE ADULT - ASSESSMENT
78 y/o male with PMHx of HTN , HLD, Asthma, no prior abdominal surgeries, complains of severe generalized abdominal pain and vomiting since about 8 PM.  Pain is described as sharp , persistent in the epigastric area with multiple > 4NBNB vomiting . Last BM was yesterday morning with persistent flatus. Mild leucocytosis, dehydrated , lactate is 2.1 . CT shows SBO. Pt was taken to the OR for diagnostic laproscopy which was converted to exploratory lap with ileostomy due to spillage. ICU was consulted as pt was requiring pressor support during and post surgery.     #SBO w/ abdominal perforation, spillage of gastric contents   #AHRF 2/2 PNA, ARDS  #Pankaj vs PANKAJ on CKD  #HTN  #HLD  #Asthma      Plan:  Neuro:  Currently intubated, no longer sedated, still not waking up, no purposeful movements or withdrawal to pain  CTH non-con negative for stroke/hemorrhage, possible uremic encephalopathy (BUN improving), metabolic encephalopath  Baseline AOx3  f/u spot EEG to assess for non-convulsive status   Dr. oconnell consulted   c/w heparin gtt     Cardiovascular:  #Shock - Septic Shock 2/2 to SBO complicated by abdominal perforation with spillage of gastric contents during surgery  Currently requiring pressor support lavo and vaso, worsening shock (febrile, hypotensive, tachycardic)  Will titrate down as tolerated  Fluid status- worsening urine output,  edema on physical exam   HD 6/22, 6/23, and 6/24, 6/27    #AFib  afib w/ rate to 120-130 initially, improved  continue to monitor     #HTN   Pt has history of HTN on amlodipine.  Will hold in the setting of shock.     Pulmonary:   #Acute Hypoxic Respiratory Failure 2/2 to fluid overload vs. pneumonia vs. ARDS  POCUS exam 6/19- diffuse B-lines, signs of retrocardiac consolidations    P/F ratio: <300, mild-mod hypoxemia, indicative of ARDS   pt. on FiO2 40%, PEEP 8 today, will keep vented due to lack of mental status   pt on meropenem, and vancomycin added 6/26 to broaden coverage     Infectious Diseases:  #Septic Shock 2/2 to SBO complicated by abdominal perforation with spillage of gastric contents during surgery  s/p exploratory laproscopy  s/p ileostomy - minimal fecal content in ileostomy   NGT in place, started suction 6/24, tube feeds stopped 6/23  Temp spiking on 6/23, no recent fever spikes   CT abdomen pelvis w/ IV and PO contrast - limited oral contrast study since contrast remained in the abdomen. Suggestive of continued SBO, atretic ostomy site, and possible bowel necrosis.  Catheter placed in ostomy site to keep patent  surgery aware, set NGT to suction - drained 900cc   no plans for re-visit to OR  pt on meropenem, and vancomycin added 6/26 to broaden coverage   - possible concern for line infection, will change CVC and lori today     Gastrointestinal:  #SBO  s/p exploratory laproscopy.   s/p ileostomy   will keep npo   NGT in place, started suction 6/24, tube feeds stopped 6/23  Temp spiking on 6/23, no recent fever spikes   CT abdomen pelvis w/ IV and PO contrast - limited oral contrast study since contrast remained in the abdomen. Suggestive of continued SBO, atretic ostomy site, and possible bowel necrosis.  -catheter placed to hold ostomy site patent  surgery aware, set NGT to suction - drained 900cc   no plans for re-visit to OR  c/w meropenem and vancomycin added today       #Transaminitis   slighty uptrend in LFTs  likely in the setting of sepsis    Renal:  #ATN due to sepsis   Pt w/ SCr 2.08 on admission  Baseline SCr - Unknown  Scr 4/76, slight downtrend after 2 HD sessions , BUN downtrending as well   Urine lytes- show intrinsic picture, likely ATN   HD 6/22, 6/23, 6/24, 6/27  hold IVF due to risk of fluid overload  follow BMP daily    #Metabolic Acidosis  ABG 7.41/39/76/23 -------6/26  Metabolic acidosis likely due to septic shock, lactic acidosis   Acidosis improving after adjustment of the vent settings  Nephro Dr. Brown      #Hyperkalemia   Potassium 5.7 > 6.2 > 5.5 > 5.4 > 4.0  resolving     Heme/onc:  #Thrombocytopenia possibly DIC   - Plts downtrending, 129 today (came in with 200s), improving   - possibly in the setting of infection, ITP, DIC   - fibrinogen 902> 116 (lab error) > 810, >362> 353, hapto 219  - platelet count blue top- 58    #Coagulopathy 2/2 sepsis  - LE dopplers - left saphenous vein DVT from groin to knee  - clotted RIJ on 6/22, needed to replace and inject heparin inside the new catheter  - Fibrinogen and D-Dimer elevated   - started on heparin gtt  6/22, stopped and given one dose of eliquis 10mg, then started back on 6/23, still running 6/26   - platelet count improving     Endo:   #HLD  Pt has history of HLD  On Rosuvastatin 10 at home   hold statin for now    Skin/ catheter: -   [facility]   [facility] central line 6/20 > will replace  [place] - 6/17-6/18   R Ax lori - 6/18 > will replace  Caude langley - 6/17    Prophylaxis:   DVT: on heparin gtt for FD DVT tx   GI: PPI     Goals of Care: DNR    Dispo: Will continue to monitor in ICU.    76 y/o male with PMHx of HTN , HLD, Asthma, no prior abdominal surgeries, complains of severe generalized abdominal pain and vomiting since about 8 PM.  Pain is described as sharp , persistent in the epigastric area with multiple > 4NBNB vomiting . Last BM was yesterday morning with persistent flatus. Mild leucocytosis, dehydrated , lactate is 2.1 . CT shows SBO. Pt was taken to the OR for diagnostic laproscopy which was converted to exploratory lap with ileostomy due to spillage. ICU was consulted as pt was requiring pressor support during and post surgery.     #SBO w/ abdominal perforation, spillage of gastric contents   #AHRF 2/2 PNA, ARDS  #Pankaj vs PANKAJ on CKD  #HTN  #HLD  #Asthma      Plan:  Neuro:  Currently intubated, no longer sedated, still not waking up, no purposeful movements or withdrawal to pain  CTH non-con negative for stroke/hemorrhage, possible uremic encephalopathy (BUN improving), metabolic encephalopath  Baseline AOx3  spot EEG to assess - negative for seizures, L posterior slowing   Dr. oconnell on board  d/c heparin gtt, no escalation of care    Cardiovascular:  #Shock - Septic Shock 2/2 to SBO complicated by abdominal perforation with spillage of gastric contents during surgery  Currently requiring pressor support lavo and vaso, worsening shock (febrile, hypotensive, tachycardic)  Will titrate down as tolerated  Fluid status- worsening urine output,  edema on physical exam , langley d/c'd 6/26  HD 6/22, 6/23, and 6/24, 6/27    #AFib  afib w/ rate to 120-130 initially, improved  continue to monitor     #HTN   Pt has history of HTN on amlodipine.  Will hold in the setting of shock.     Pulmonary:   #Acute Hypoxic Respiratory Failure 2/2 to fluid overload vs. pneumonia vs. ARDS  POCUS exam 6/19- diffuse B-lines, signs of retrocardiac consolidations    P/F ratio: <300, mild-mod hypoxemia, indicative of ARDS   pt. on FiO2 40%, PEEP 8 today, will keep vented due to lack of mental status   pt on meropenem, and vancomycin added 6/26 to broaden coverage     Infectious Diseases:  #Septic Shock 2/2 to SBO complicated by abdominal perforation with spillage of gastric contents during surgery  s/p exploratory laproscopy  s/p ileostomy - minimal fecal content in ileostomy   NGT in place, started suction 6/24, tube feeds stopped 6/23  Temp spiking on 6/23, no recent fever spikes   CT abdomen pelvis w/ IV and PO contrast - limited oral contrast study since contrast remained in the abdomen. Suggestive of continued SBO, atretic ostomy site, and possible bowel necrosis.  Catheter placed in ostomy site to keep patent  surgery aware, set NGT to suction - drained 900cc   no plans for re-visit to OR  pt on meropenem, and vancomycin added 6/26 to broaden coverage   - no escalation of care decided    Gastrointestinal:  #SBO  s/p exploratory laproscopy.   s/p ileostomy   will keep npo   NGT in place, started suction 6/24, tube feeds stopped 6/23  Temp spiking on 6/23, no recent fever spikes   CT abdomen pelvis w/ IV and PO contrast - limited oral contrast study since contrast remained in the abdomen. Suggestive of continued SBO, atretic ostomy site, and possible bowel necrosis.  -catheter placed to hold ostomy site patent  surgery aware, set NGT to suction - drained 900cc   no plans for re-visit to OR  c/w meropenem and vancomycin      #Transaminitis   slighty uptrend in LFTs  likely in the setting of sepsis    Renal:  #ATN due to sepsis   Pt w/ SCr 2.08 on admission  Baseline SCr - Unknown  Scr 4/76, slight downtrend after 2 HD sessions , BUN downtrending as well   Urine lytes- show intrinsic picture, likely ATN   HD 6/22, 6/23, 6/24, 6/27  hold IVF due to risk of fluid overload  follow BMP daily    #Metabolic Acidosis  ABG 7.41/39/76/23 -------6/26 >> 6/27 7.24/30/117/13  Metabolic acidosis likely due to septic shock, lactic acidosis   Acidosis improving after adjustment of the vent settings  Nephro Dr. Brown      #Hyperkalemia   Potassium 5.7 > 6.2 > 5.5 > 5.4 > 4.0 >>~7 yesterday   Too hemodynamically stable to dialyze   hyperkalemia cocktails given yesterday  no escalation of care     Heme/onc:  #Thrombocytopenia possibly DIC   - Plts downtrending, 129 today (came in with 200s)~  - possibly in the setting of infection, ITP, DIC   - fibrinogen 902> 116 (lab error) > 810, >362> 353, hapto 219  - platelet count blue top- 58    #Coagulopathy 2/2 sepsis  - LE dopplers - left saphenous vein DVT from groin to knee  - clotted RIJ on 6/22, needed to replace and inject heparin inside the new catheter  - Fibrinogen and D-Dimer elevated   - started on heparin gtt  6/22, stopped and given one dose of eliquis 10mg, then started back on 6/23, still running 6/26   - will d/c heparin gtt, no escalation of care     Endo:   #HLD  Pt has history of HLD  On Rosuvastatin 10 at home   hold statin for now    Skin/ catheter: -   Fem central Line d/c'd  [facility] 6/22   [facility] central line 6/20 > will replace  [place] - 6/17-6/18   R Ax lori - 6/18 > will replace  Caude langley - 6/17    Prophylaxis:   DVT: on heparin gtt for FD DVT tx   GI: PPI     Goals of Care: DNR    Dispo: Will continue to monitor in ICU.

## 2023-06-27 NOTE — PROGRESS NOTE ADULT - ATTENDING SUPERVISION STATEMENT
Resident
Resident/Fellow
Resident
Resident/Fellow

## 2023-06-27 NOTE — PROGRESS NOTE ADULT - ATTENDING COMMENTS
IMP: This is a 77 yr old man  with  HTN , HLD, Asthma, no prior abdominal surgeries, complains of severe generalized abdominal pain and vomiting since about 8 PM.  Pain is described as sharp , persistent in the epigastric area with multiple > 4NBNB vomiting . Last BM was yesterday morning with persistent flatus. Mild leucocytosis, dehydrated , lactate is 2.1 . CT shows SBO. Pt was taken to the OR for diagnostic laparoscopy which was converted to exploratory lap with ileostomy due to spillage. ICU was consulted as pt was requiring pressor support during and post surgery.       ASSESSMENT   - Acute Hypoxic Resp Failure   - SBO w/ abdominal perforation, spillage of gastric contents   - Right Iliac vein DVT   - Peritonitis   - Septic Shock   - GA on CKD   - Lactate acidosis   - HLD  - Asthma  - MSOF    Plan:   - Minimally responsive to noxious stimulation   - Close neurologic monitoring   - CT head with out acute strokes   - Obtain EEG   - Continue vent support , adjust vent as per ABG - not a candidate for weaning due to mentation  - Adjust vent for ABG, titrate down Peep and FIo2 as tolerated  - S/p Albumin for hypoalbuminemia  - Hemodynamic support   - Vasopressors to maintain MAP > 65   - Continue antibiotics - adjusted for GFR  - CT abdomen/pelvis noted  - Surgical f/u   - Monitor ileostomy out put  - Hold tube feedings for now  - Wound care as per surgery   - HD as per nephrology    - HD catheter previously been retracted   - Urology consult for prostatic langley  - Right iliac vein DVT  - Cont. heparin infusion   - Platelets improving  - Correct electrolytes  - GI prophylaxis   - Prognosis guarded  - DNR code status  - Discussed with family at bedside
IMP: This is a 77 yr old man  with  HTN , HLD, Asthma, no prior abdominal surgeries, complains of severe generalized abdominal pain and vomiting since about 8 PM.  Pain is described as sharp , persistent in the epigastric area with multiple > 4NBNB vomiting . Last BM was yesterday morning with persistent flatus. Mild leucocytosis, dehydrated , lactate is 2.1 . CT shows SBO. Pt was taken to the OR for diagnostic laparoscopy which was converted to exploratory lap with ileostomy due to spillage. ICU was consulted as pt was requiring pressor support during and post surgery.       ASSESSMENT   - Acute Hypoxic Resp Failure   - SBO w/ abdominal perforation, spillage of gastric contents   - Right Iliac vein DVT   - Peritonitis   - Septic Shock   - GA on CKD   - Lactate acidosis   - HLD  - Asthma  - MSOF    Plan:   - Remains obtunded off sedation  - Close neurologic monitoring   - CT head with out acute strokes   - Obtain EEG   - Continue vent support , adjust vent as per ABG - not a candidate for weaning due to mentation  - Adjust vent for ABG, titrate down Peep and FIo2 as tolerated  - Albumin for hypoalbuminemia  - Hemodynamic support   - Vasopressors to maintain MAP > 65   - Continue antibiotics - adjusted for GFR  - CT abdomen/pelvis noted  - Surgical f/u   - Monitor ileostomy out put  - Hold tube feedings for now  - Wound care as per surgery   - HD as per nephrology    - Retract HD catheter  - Urology consult for prostatic langley  - Right iliac vein DVT  - Cont. heparin infusion   - Platelets improving  - Correct electrolytes  - GI prophylaxis   - Prognosis guarded  - DNR code status
77M PMH HTN, HLD, asthma, p/w severe abdominal pain and NBNB emesis, found with SBO s/p ex lap, resection, creation of ileostomy, admitted to ICU for post-op management. Remains in septic/vasoplegic shock.      Plan:  - MV, vent bundle  - MAP goal >65, on norepi and vaso; add stress dose steroids, epi if needed  - Renal function worsening, monitor UOP. Nephro c/s pending. Adequately fluid resuscitated   - C/w cefepime and flagyl, start caspo  - TTE  - Trend lactate  - Trend troponin  - F/u peripheral ID w/u  - Family updated, GOC discussions ongoing      Miroslava Smith MD  Pulmonary & Critical Care  Available on Teams
IMP: This is a 77 yr old man  with  HTN , HLD, Asthma, no prior abdominal surgeries, complains of severe generalized abdominal pain and vomiting since about 8 PM.  Pain is described as sharp , persistent in the epigastric area with multiple > 4NBNB vomiting . Last BM was yesterday morning with persistent flatus. Mild leucocytosis, dehydrated , lactate is 2.1 . CT shows SBO. Pt was taken to the OR for diagnostic laparoscopy which was converted to exploratory lap with ileostomy due to spillage. ICU was consulted as pt was requiring pressor support during and post surgery.          ASSESSMENT     - Acute Hypoxic Resp Failure   - SBO w/ abdominal perforation, spillage of gastric contents   - Peritonitis   - Septic Shock   - MOSF   - GA on CKD   - Elevated lactatic acidosis   - HTN  - HLD  - Asthma        Plan   - Continue pain meds and sedation for vent synchrony with comfort   - Continue vent support , adjust vent as per ABG   - Hemodynamic support   - Requiring high dose of multiple  iv vasopressors , titrate to maintain MAP>65  - May not tolerate HD due to requirement of multiple pressors on high dose   - Continue antibx / antifungal   - F/U cultures  - Surgical f/u noted   - Monitor ileostomy out put   - Wound care as per surgery   - Neph eval noted   - Anuric , trial of lasix 80 mg ivp   - Off bicarb gtt  - Correct electrolyte   - DVT GI prophylaxis   - Palliative eval noted
IMP: This is a 77 yr old man  with  HTN , HLD, Asthma, no prior abdominal surgeries, complains of severe generalized abdominal pain and vomiting since about 8 PM.  Pain is described as sharp , persistent in the epigastric area with multiple > 4NBNB vomiting . Last BM was yesterday morning with persistent flatus. Mild leucocytosis, dehydrated , lactate is 2.1 . CT shows SBO. Pt was taken to the OR for diagnostic laparoscopy which was converted to exploratory lap with ileostomy due to spillage. ICU was consulted as pt was requiring pressor support during and post surgery.       ASSESSMENT   - Acute Hypoxic Resp Failure   - SBO w/ abdominal perforation, spillage of gastric contents   - Right Iliac vein DVT   - Peritonitis   - Septic Shock   - GA on CKD   - Lactate acidosis   - Shock liver  - HLD  - Asthma  - MSOF    Plan:   - Worsening shock with evidence of hypoperfusion. Increasing abdominal distension with elevated latate suspicious for gut ischemia. Refractory shock despite steroids and vasopressors. Unable to tolerate HD due to hemodynamics. Lactate continues to rise. Patient is hyperkalemic.   - Minimally responsive to noxious stimulation   - Close neurologic monitoring   - Add low dose dilaudid for comfort  - CT head with out acute strokes   - EEG noted with diffuse dysfunction but with out seizures  - Continue vent support , adjust vent as per ABG - not a candidate for weaning due to mentation  - Adjust vent for ABG, titrate down Peep and FIo2 as tolerated  - S/p Albumin for hypoalbuminemia  - Hemodynamic support  - Vasopressors to maintain MAP > 65   - Continue antibiotics - adjusted for GFR  - S/p 1 dose of vancomycin yesterdya   - Central line changed 6/26  - CT abdomen/pelvis noted  - Surgical f/u   - Monitor ileostomy out put  - Hold tube feedings for now  - Wound care as per surgery   - Unable to tolerate HD   - Naik replaced   - Right iliac vein DVT  - Cont. heparin infusion   - Platelets improving  - Correct electrolytes  - GI prophylaxis   - Prognosis guarded  - DNR code status  - Discussed with family at bedside  - Discussed with surgical attending  - Prognosis is poor at this time for meaningful recovery. Patient appears to be imminently dying. Agree with DNR code status. Family to arrive at bedside. No further escalation in care.
IMP: This is a 77 yr old man  with  HTN , HLD, Asthma, no prior abdominal surgeries, complains of severe generalized abdominal pain and vomiting since about 8 PM.  Pain is described as sharp , persistent in the epigastric area with multiple > 4NBNB vomiting . Last BM was yesterday morning with persistent flatus. Mild leucocytosis, dehydrated , lactate is 2.1 . CT shows SBO. Pt was taken to the OR for diagnostic laparoscopy which was converted to exploratory lap with ileostomy due to spillage. ICU was consulted as pt was requiring pressor support during and post surgery.          ASSESSMENT     - Acute Hypoxic Resp Failure   - SBO w/ abdominal perforation, spillage of gastric contents   - Peritonitis   - Septic Shock   - MOSF   - GA on CKD   - Elevated lactate acidosis   - HTN  - HLD  - Asthma        Plan   - Continue pain meds and sedation for vent synchrony with comfort   - Continue vent support , adjust vent as per ABG   - D/C versed   - Hemodynamic support   - Requiring high dose of multiple  iv vasopressors , titrate to maintain MAP>65  - May not tolerate HD due to requirement of multiple pressors on high dose   - Continue antibx / antifungal   - BC cultures neg   - Surgical f/u noted   - Monitor ileostomy out put   - Wound care as per surgery   - Neph eval noted   - Bumex gtt with good urine out put . Overnight started on albumin   - D/C albumin   - keep neg fluid balance   - Correct electrolyte   - DVT GI prophylaxis   - Prognosis guarded
IMP: This is a 77 yr old man  with  HTN , HLD, Asthma, no prior abdominal surgeries, complains of severe generalized abdominal pain and vomiting since about 8 PM.  Pain is described as sharp , persistent in the epigastric area with multiple > 4NBNB vomiting . Last BM was yesterday morning with persistent flatus. Mild leucocytosis, dehydrated , lactate is 2.1 . CT shows SBO. Pt was taken to the OR for diagnostic laparoscopy which was converted to exploratory lap with ileostomy due to spillage. ICU was consulted as pt was requiring pressor support during and post surgery.       ASSESSMENT   - Acute Hypoxic Resp Failure   - SBO w/ abdominal perforation, spillage of gastric contents   - Peritonitis   - Septic Shock   - GA on CKD   - Lactate acidosis   - HLD  - Asthma  - MSOF    Plan   - Taper sedation. start precedex  - Change fentanyl to PRN pushes   - Close neurologic monitoring   - Continue vent support , adjust vent as per ABG   - Adjust vent for ABG, titrate down Peep and FIo2  - Suspect volume overload   - Hemodynamic support   - Requiring high dose of multiple  iv vasopressors , titrate to maintain MAP>65  - IV stress dose steroids  - Continue antibiotics - follow up cultures  - Surgical f/u noted   - Monitor ileostomy out put   - Wound care as per surgery   - Neph eval noted   - Hold bumex and monitor urine output   - keep neg fluid balance   - Correct electrolyte   - DVT GI prophylaxis   - Prognosis guarded  - Discussed with daughter at bedside  - DNR code status
IMP: This is a 77 yr old man  with  HTN , HLD, Asthma, no prior abdominal surgeries, complains of severe generalized abdominal pain and vomiting since about 8 PM.  Pain is described as sharp , persistent in the epigastric area with multiple > 4NBNB vomiting . Last BM was yesterday morning with persistent flatus. Mild leucocytosis, dehydrated , lactate is 2.1 . CT shows SBO. Pt was taken to the OR for diagnostic laparoscopy which was converted to exploratory lap with ileostomy due to spillage. ICU was consulted as pt was requiring pressor support during and post surgery.       ASSESSMENT   - Acute Hypoxic Resp Failure   - SBO w/ abdominal perforation, spillage of gastric contents   - Peritonitis   - Septic Shock   - GA on CKD   - Lactate acidosis   - HLD  - Asthma  - MSOF    Plan:   - Awakening trial this morning   - Close neurologic monitoring   - Continue vent support , adjust vent as per ABG   - Adjust vent for ABG, titrate down Peep and FIo2 as tolerated  - Suspect volume overload, will hold off on further volume resuscitation   - Albumin for hypoalbuminemia  - Hemodynamic support   - Tapering dose of vasopressors  - D/c IV stress steroids  - Continue antibiotics - follow up cultures  - Surgical f/u noted   - Monitor ileostomy out put   - Wound care as per surgery   - Neph eval noted   - Cont. bumex   - HD today given elevated bun/creatinine   - keep neg fluid balance   - Correct electrolyte   - Start trickle tube feedings   - DVT GI prophylaxis   - Prognosis guarded  - Discussed Dr. Kuhn today  - DNR code status
IMP: This is a 77 yr old man  with  HTN , HLD, Asthma, no prior abdominal surgeries, complains of severe generalized abdominal pain and vomiting since about 8 PM.  Pain is described as sharp , persistent in the epigastric area with multiple > 4NBNB vomiting . Last BM was yesterday morning with persistent flatus. Mild leucocytosis, dehydrated , lactate is 2.1 . CT shows SBO. Pt was taken to the OR for diagnostic laparoscopy which was converted to exploratory lap with ileostomy due to spillage. ICU was consulted as pt was requiring pressor support during and post surgery.       ASSESSMENT   - Acute Hypoxic Resp Failure   - SBO w/ abdominal perforation, spillage of gastric contents   - Peritonitis   - Septic Shock   - GA on CKD   - Lactate acidosis   - HLD  - Asthma  - MSOF    Plan:   - Remains obtunded off sedation  - Close neurologic monitoring   - Obtain CT scan of the head   - Continue vent support , adjust vent as per ABG   - Adjust vent for ABG, titrate down Peep and FIo2 as tolerated  - Albumin for hypoalbuminemia  - Hemodynamic support   - Vasopressors to maintain MAP > 65   - D/c IV stress steroids  - Continue antibiotics - follow up cultures  - Febrile this morning with low grade fevers   - Obtain CT scan of the abdomen/pelvis  - Surgical f/u noted   - Monitor ileostomy out put  - Hold tube feedings for now  - Wound care as per surgery   - Neph eval noted   - HD as per nephrology    - keep neg fluid balance   - Concern for possible LE DVT as POCUS showing non compressible femoral veins  - Start heparin infusion if ok with surgery   - Obtain LE dopplers  - Correct electrolyte   - DVT GI prophylaxis   - Prognosis guarded  - Discussed Dr. Kuhn today  - DNR code status
IMP: This is a 77 yr old man  with  HTN , HLD, Asthma, no prior abdominal surgeries, complains of severe generalized abdominal pain and vomiting since about 8 PM.  Pain is described as sharp , persistent in the epigastric area with multiple > 4NBNB vomiting . Last BM was yesterday morning with persistent flatus. Mild leucocytosis, dehydrated , lactate is 2.1 . CT shows SBO. Pt was taken to the OR for diagnostic laparoscopy which was converted to exploratory lap with ileostomy due to spillage. ICU was consulted as pt was requiring pressor support during and post surgery.       ASSESSMENT   - Acute Hypoxic Resp Failure   - SBO w/ abdominal perforation, spillage of gastric contents   - Right Iliac vein DVT   - Peritonitis   - Septic Shock   - GA on CKD   - Lactate acidosis   - HLD  - Asthma  - MSOF    Plan:   - Febrile with worsening shock this morning,. increasing lactate  - Minimally responsive to noxious stimulation   - Close neurologic monitoring   - CT head with out acute strokes   - EEG noted with diffuse dysfunction but with out seizures  - Continue vent support , adjust vent as per ABG - not a candidate for weaning due to mentation  - Adjust vent for ABG, titrate down Peep and FIo2 as tolerated  - S/p Albumin for hypoalbuminemia  - Hemodynamic support  - Vasopressors to maintain MAP > 65   - Continue antibiotics - adjusted for GFR  - Broaden antibiotics coverage, dosed vancomycin   - Central line changed today  - CT abdomen/pelvis noted  - Surgical f/u   - Monitor ileostomy out put  - Hold tube feedings for now  - Wound care as per surgery   - HD as per nephrology    - HD today given worsening acidosis and hyperkalemia  - D/c langley as minimal urine output and patient is febrile  - Right iliac vein DVT  - Cont. heparin infusion   - Platelets improving  - Correct electrolytes  - GI prophylaxis   - Prognosis guarded  - DNR code status  - Discussed with family at bedside  - Overall prognosis is guarded given acute worsening in hemodynamics  - Discussed with surgical attending

## 2023-06-27 NOTE — PROGRESS NOTE ADULT - ASSESSMENT
Patient is a 77y Male with HTN , HLD, Asthma p/w abd pain a/w SBO s/p OR 6/17 dx lap converted to ex lap LOU, SBRx2 with ileostomy creation due to spillage. Pt with Septic shock, hypotension on pressors with GA and hyperkalemia. Nephrology consulted for Elevated serum creatinine.    1. GA likely 2/2 ATN in the setting of septic shock. c/w with pressors to help renal perfusion. Pt with worsening renal function/ fluid overload, for which pt was initiated on HD 6/22. Last HD on 6/26, terminated after 15 mins due to severe intradialytic hypotension, net positive ~570ml.   Pt now in multiorgan failure (liver/ kidney), septic shock/ hypotension with severe lactic acidosis. Hyperkalemia due to metabolic acidosis/ shifting with renal failure. Discussed poor prognosis with family at bedside; pt daughter Meaghan and son. They wish NOT to pursue dialysis and understand the patient is dying.   Recc palliative care consult for possible withdrawal of care/ comfort care.     2. Volume overload- Unable to tolerate fluid removal with HD due to severe hypotension despite 2 pressors  Monitor urine o/p    3. Hyperkalemia- recc  medical management. Monitor serum potassium    4. Acidosis- serum CO2 worsening due to severe lactic acidosis; lactate worsening. Poor prognosis. Consider bicarbonate pushes (since pt is anuric). Vent support as per ICU. Monitor serum Co2/ph    5. Septic Shock- antibiotics and pressors per ICU team. c/w pressors to maintain renal perfusion    6. Severe PCM- +wt loss with edema and  NPO for 10 days- ICU considering TPN. Pt not a candidate for TPN; pt actively dying.       Plan discussed with ICU team and Palliative care team.     [facility]  Aneudy Brown M.D.  Raheel Darling D.O.  My Rodriguez M.D.  Nia Limon, PRECIOUS, ANP-C    Telephone: [phone]  Facsimile: [phone] [place] [address], [address]  [place] [phone]

## 2023-06-27 NOTE — PROGRESS NOTE ADULT - SUBJECTIVE AND OBJECTIVE BOX
ICU Vital Signs Last 24 Hrs  T(C): 35.1 (27 Jun 2023 10:15), Max: 37.5 (26 Jun 2023 11:00)  T(F): 95.2 (27 Jun 2023 10:15), Max: 99.5 (26 Jun 2023 11:00)  HR: 98 (27 Jun 2023 10:15) (95 - 135)  BP: 96/67 (26 Jun 2023 12:02) (77/51 - 96/67)  BP(mean): 77 (26 Jun 2023 12:02) (61 - 77)  ABP: 108/65 (27 Jun 2023 10:15) (62/41 - 188/81)  ABP(mean): 77 (27 Jun 2023 10:15) (0 - 117)  RR: 32 (27 Jun 2023 10:15) (8 - 46)  SpO2: 100% (27 Jun 2023 08:30) (87% - 100%)    O2 Parameters below as of 26 Jun 2023 16:17  Patient On (Oxygen Delivery Method): nasal cannula         INTERVAL HPI/OVERNIGHT EVENTS: Yesterday evening, pt labs came back acidotic and  hyperkalemic, however pt did not tolerate HD yesterday. Went up on pressor requirements, started sodium bicarb gtt, given hyperkalemia cocktails. Morning labs came back with worsening hyperkalemia, lactic acidosis, and shock liver. Decisions was made by family to stop escalation of care.     PRESSORS: [x ] YES [ ] NO  WHICH: levo and vaso    Antimicrobial:    Cardiovascular:    Pulmonary:    Hematalogic:    Other:  fentaNYL    Injectable 50 MICROGram(s) IV Push every 1 hour  glycopyrrolate Injectable 0.4 milliGRAM(s) IV Push every 8 hours PRN  HYDROmorphone  Injectable 0.5 milliGRAM(s) IV Push every 1 hour PRN  LORazepam   Injectable 1 milliGRAM(s) IV Push every 1 hour PRN  sodium chloride 0.9% lock flush 10 milliLiter(s) IV Push every 1 hour PRN    fentaNYL    Injectable 50 MICROGram(s) IV Push every 1 hour  glycopyrrolate Injectable 0.4 milliGRAM(s) IV Push every 8 hours PRN  HYDROmorphone  Injectable 0.5 milliGRAM(s) IV Push every 1 hour PRN  LORazepam   Injectable 1 milliGRAM(s) IV Push every 1 hour PRN  sodium chloride 0.9% lock flush 10 milliLiter(s) IV Push every 1 hour PRN    Drug Dosing Weight  Height (cm): 170 (17 Jun 2023 01:51)  Weight (kg): 81 (17 Jun 2023 19:13)  BMI (kg/m2): 28 (17 Jun 2023 19:13)  BSA (m2): 1.93 (17 Jun 2023 19:13)    CENTRAL LINE: [x ] YES [ ] NO  LOCATION:   DATE INSERTED:  REMOVE: [ ] YES [ ] NO  EXPLAIN:    LANGLEY: [ ] YES [x ] NO    DATE INSERTED:  REMOVE:  [ ] YES [ ] NO  EXPLAIN:    A-LINE:  [x ] YES [ ] NO  LOCATION:   DATE INSERTED:  REMOVE:  [ ] YES [ ] NO  EXPLAIN:    PMH -reviewed admission note, no change since admission  PAST MEDICAL & SURGICAL HISTORY:  Hypertension      History of asthma      History of high cholesterol      No significant past surgical history          ICU Vital Signs Last 24 Hrs  T(C): 35.3 (27 Jun 2023 11:15), Max: 37 (26 Jun 2023 13:45)  T(F): 95.5 (27 Jun 2023 11:15), Max: 98.6 (26 Jun 2023 13:45)  HR: 0 (27 Jun 2023 11:15) (0 - 162)  BP: --  BP(mean): --  ABP: 61/37 (27 Jun 2023 11:15) (61/37 - 188/81)  ABP(mean): 43 (27 Jun 2023 11:15) (0 - 117)  RR: 22 (27 Jun 2023 11:15) (8 - 44)  SpO2: 51% (27 Jun 2023 11:00) (51% - 100%)    O2 Parameters below as of 26 Jun 2023 16:17  Patient On (Oxygen Delivery Method): nasal cannula            ABG - ( 27 Jun 2023 04:00 )  pH, Arterial: 7.24  pH, Blood: x     /  pCO2: 30    /  pO2: 117   / HCO3: 13    / Base Excess: -13.2 /  SaO2: 99                    06-26 @ 07:01  -  06-27 @ 07:00  --------------------------------------------------------  IN: 7973.8 mL / OUT: 3533 mL / NET: 4440.8 mL        Mode: AC/ CMV (Assist Control/ Continuous Mandatory Ventilation)  RR (machine): 30  TV (machine): 450  FiO2: 40  PEEP: 8  ITime: 1  MAP: 17  PIP: 36      PHYSICAL EXAM:  GENERAL: NAD, intubated , not sedated   HEAD:  Atraumatic, Normocephalic  EYES: EOMI, PERRLA,  pupils 1mm  ENMT: No tonsillar erythema, exudates, or enlargement; Moist mucous membranes, Good dentition, No lesions; Sump tube connected to suction draining biliary fluid  NECK: ETT in place  NERVOUS SYSTEM:  No longer on sedation, +withdrawal to noxious stimuli, starting to have grimacing and bitting on tube, likely involuntary movements   CHEST/LUNG: No chest deformity; Normal percussion bilaterally; No rales, rhonchi, wheezing   HEART: Regular rate and rhythm; No murmurs, rubs, or gallops  ABDOMEN: Soft, Nontender, distended; Bowel sounds present, (+) ileostomy in place draining 30cc of fluid, tissue at the ileostomy site looks dusky. surgical scar w/ clean dressing.    : langley catheter in place, draining scant amount of urine  EXTREMITIES:  2+ Peripheral Pulses, No clubbing, cyanosis, (+) upper and lower extremity edema, scrotal edema improving   LYMPH: No lymphadenopathy noted  SKIN: No rashes or lesions;  Good capillary refill        LABS:  CBC Full  -  ( 27 Jun 2023 04:20 )  WBC Count : 24.99 K/uL  RBC Count : 2.88 M/uL  Hemoglobin : 8.3 g/dL  Hematocrit : 26.2 %  Platelet Count - Automated : 92 K/uL  Mean Cell Volume : 91.0 fl  Mean Cell Hemoglobin : 28.8 pg  Mean Cell Hemoglobin Concentration : 31.7 gm/dL  Auto Neutrophil # : x  Auto Lymphocyte # : x  Auto Monocyte # : x  Auto Eosinophil # : x  Auto Basophil # : x  Auto Neutrophil % : x  Auto Lymphocyte % : x  Auto Monocyte % : x  Auto Eosinophil % : x  Auto Basophil % : x    06-27    140  |  99  |  150<H>  ----------------------------<  264<H>  7.4<HH>   |  13<L>  |  6.58<H>    Ca    7.5<L>      27 Jun 2023 05:00  Phos  13.5     06-27  Mg     2.9     06-27    TPro  4.1<L>  /  Alb  1.0<L>  /  TBili  2.8<H>  /  DBili  x   /  AST  >2000<H>  /  ALT  4732<H>  /  AlkPhos  259<H>  06-27    PT/INR - ( 26 Jun 2023 04:43 )   PT: 18.6 sec;   INR: 1.56 ratio         PTT - ( 27 Jun 2023 05:00 )  PTT:123.3 sec  Urinalysis Basic - ( 27 Jun 2023 05:00 )    Color: x / Appearance: x / SG: x / pH: x  Gluc: 264 mg/dL / Ketone: x  / Bili: x / Urobili: x   Blood: x / Protein: x / Nitrite: x   Leuk Esterase: x / RBC: x / WBC x   Sq Epi: x / Non Sq Epi: x / Bacteria: x          RADIOLOGY & ADDITIONAL STUDIES REVIEWED:  ***    [ ]GOALS OF CARE DISCUSSION WITH PATIENT/FAMILY/PROXY:    CRITICAL CARE TIME SPENT: 35 minutes

## 2023-06-27 NOTE — PROGRESS NOTE ADULT - NUTRITIONAL ASSESSMENT
This patient has been assessed with a concern for Malnutrition and has been determined to have a diagnosis/diagnoses of Moderate protein-calorie malnutrition.    This patient is being managed with:   Diet NPO with Tube Feed-  Tube Feeding Modality: Orogastric  Nepro with Carb Steady  Continuous  Starting Tube Feed Rate {mL per Hour}: 10  Until Goal Tube Feed Rate (mL per Hour): 10  Tube Feed Duration (in Hours): 24  Tube Feed Start Time: 09:00  Entered: Jun 22 2023  9:05AM  
This patient has been assessed with a concern for Malnutrition and has been determined to have a diagnosis/diagnoses of Moderate protein-calorie malnutrition.    This patient is being managed with:   Diet NPO with Tube Feed-  Tube Feeding Modality: Orogastric  Nepro with Carb Steady  Continuous  Starting Tube Feed Rate {mL per Hour}: 10  Until Goal Tube Feed Rate (mL per Hour): 10  Tube Feed Duration (in Hours): 24  Tube Feed Start Time: 09:00  Entered: Jun 22 2023  9:05AM  
This patient has been assessed with a concern for Malnutrition and has been determined to have a diagnosis/diagnoses of Moderate protein-calorie malnutrition.    This patient is being managed with:   Diet NPO-  Except Medications  Entered: Jun 23 2023 12:40PM  
This patient has been assessed with a concern for Malnutrition and has been determined to have a diagnosis/diagnoses of Severe protein-calorie malnutrition.    This patient is being managed with:   Diet NPO-  Entered: Jun 26 2023 11:02PM  
This patient has been assessed with a concern for Malnutrition and has been determined to have a diagnosis/diagnoses of Moderate protein-calorie malnutrition.    This patient is being managed with:   Diet NPO-  Entered: Jun 17 2023  6:24AM  
This patient has been assessed with a concern for Malnutrition and has been determined to have a diagnosis/diagnoses of Moderate protein-calorie malnutrition.    This patient is being managed with:   Diet NPO with Tube Feed-  Tube Feeding Modality: Orogastric  Nepro with Carb Steady  Continuous  Starting Tube Feed Rate {mL per Hour}: 10  Until Goal Tube Feed Rate (mL per Hour): 10  Tube Feed Duration (in Hours): 24  Tube Feed Start Time: 09:00  Entered: Jun 22 2023  9:05AM  
This patient has been assessed with a concern for Malnutrition and has been determined to have a diagnosis/diagnoses of Moderate protein-calorie malnutrition.    This patient is being managed with:   Diet NPO with Tube Feed-  Tube Feeding Modality: Orogastric  Nepro with Carb Steady  Continuous  Starting Tube Feed Rate {mL per Hour}: 10  Until Goal Tube Feed Rate (mL per Hour): 10  Tube Feed Duration (in Hours): 24  Tube Feed Start Time: 09:00  Entered: Jun 22 2023  9:05AM  
This patient has been assessed with a concern for Malnutrition and has been determined to have a diagnosis/diagnoses of Moderate protein-calorie malnutrition.    This patient is being managed with:   Diet NPO-  Entered: Jun 17 2023  6:24AM  
This patient has been assessed with a concern for Malnutrition and has been determined to have a diagnosis/diagnoses of Moderate protein-calorie malnutrition.    This patient is being managed with:   Diet NPO-  Except Medications  Entered: Jun 23 2023 12:40PM  
This patient has been assessed with a concern for Malnutrition and has been determined to have a diagnosis/diagnoses of Moderate protein-calorie malnutrition.    This patient is being managed with:   Diet NPO with Tube Feed-  Tube Feeding Modality: Orogastric  Nepro with Carb Steady  Continuous  Starting Tube Feed Rate {mL per Hour}: 10  Until Goal Tube Feed Rate (mL per Hour): 10  Tube Feed Duration (in Hours): 24  Tube Feed Start Time: 09:00  Entered: Jun 22 2023  9:05AM  
This patient has been assessed with a concern for Malnutrition and has been determined to have a diagnosis/diagnoses of Moderate protein-calorie malnutrition.    This patient is being managed with:   Diet NPO-  Except Medications  Entered: Jun 23 2023 12:40PM

## 2023-06-27 NOTE — PROGRESS NOTE ADULT - ASSESSMENT
KATJA CANO is a 77y Male POD#10  s/p exploratory laparotomy with small bowel resection, Lysis of intestinal adhesions , Ileostomy creation   Remains critically ill intubated, on multiple pressors  CTAP with severely dilated loops of bowel  Lactate 17, liver enzymes elevated, ARF. ostomy with minimal output    PLAN  - Continue NGT to wall suction   - Monitor ostomy output/fxn   - Nephrology recommendations appreciated   - appreciate neuro c/s   - medical care per ICU team  - will discuss with Dr. Kuhn

## 2023-06-27 NOTE — DISCHARGE NOTE FOR THE EXPIRED PATIENT - HOSPITAL COURSE
76 y/o male with PMHx of HTN , HLD, Asthma, no prior abdominal surgeries, complains of severe generalized abdominal pain and vomiting since about 8 PM.  Pain is described as sharp , persistent in the epigastric area with multiple > 4NBNB vomiting . Last BM was day before presentation morning with persistent flatus. Mild leucocytosis, dehydrated , lactate is 2.1 . CT shows SBO. Pt was taken to the OR for diagnostic laproscopy which was converted to exploratory lap with ileostomy due to abdominal perforation, with gastric content spillage. ICU was consulted as pt was requiring pressor support during and post surgery. Pt had a long and complicated ICU stay, please refer to chart for full hospital course.   During ICU stay, pt. was found to be persistently hypotensive requiring vasopressor support. Pt. was initially started on meropenem, flagyl, and caspofungin (caspofungin and flagyl later discontinued). He remained intubated and sedated. His hospital course was complicated by metabolic acidemia, hyperkalemia, acute renal failure. Pt required dialysis for acute renal failure, he underwent multiple sessions. SAT was attempted, pt was off sedation but did not recover mental status even after several days. CT head was done, did not show any acute changes, and EEG was done, showed evidence for structural lesion and increased risk for seizures from L posterior region, severe diffuse/multifocal cerebral dysfunction, no seizures. CT abdomen showed persistence of SBO as well as evidence of bowel necrosis. He had also developed coagulopathy, with DVT and thrombocytopenia. LFTs trended up consistent with shock liver. Pt was not a candidate for returning to the OR. His condition continued to decline, he had 700 cc of feculent output through NGT. Presser requirement increased, family meeting was held, explained that pt was in multiorgan failure, and was dying. Family agreed to comfort measures, with continued mechanical ventilation. Pt passed away at 11:32 am with his family at bedside. 78 y/o male with PMHx of HTN , HLD, Asthma, no prior abdominal surgeries, complains of severe generalized abdominal pain and vomiting since about 8 PM.  Pain is described as sharp , persistent in the epigastric area with multiple > 4NBNB vomiting . Last BM was day before presentation morning with persistent flatus. Mild leucocytosis, dehydrated , lactate is 2.1 . CT shows SBO. Pt was taken to the OR for diagnostic laproscopy which was converted to exploratory lap with ileostomy due to abdominal perforation, with gastric content spillage. ICU was consulted as pt was requiring pressor support during and post surgery. Pt had a long and complicated ICU stay, please refer to chart for full hospital course.   During ICU stay, pt. was found to be persistently hypotensive requiring vasopressor support. Pt. was initially started on meropenem, flagyl, and caspofungin (caspofungin and flagyl later discontinued). He remained intubated and sedated. His hospital course was complicated by metabolic acidemia, hyperkalemia, acute renal failure. Pt required dialysis for acute renal failure, he underwent multiple sessions. SAT was attempted, pt was off sedation but did not recover mental status even after several days. CT head was done, did not show any acute changes, and EEG was done, showed evidence for structural lesion and increased risk for seizures from L posterior region, severe diffuse/multifocal cerebral dysfunction, no seizures. CT abdomen showed persistence of SBO as well as evidence of bowel necrosis. He had also developed coagulopathy, with DVT and thrombocytopenia. LFTs trended up consistent with shock liver. Pt was not a candidate for returning to the OR. His condition continued to decline, he had 700 cc of feculent output through NGT. Presser requirement increased, family meeting was held, explained that pt was in multiorgan failure, and was dying. Family agreed to comfort measures, with continued mechanical ventilation. Pastoral support was provided. Pt was found to be in asystole, absent corneal reflexes, blown pupils, absent respiratory effort. He was pronounced dead at 11:32 am with his family at bedside. 76 y/o male with PMHx of HTN , HLD, Asthma, no prior abdominal surgeries, complains of severe generalized abdominal pain and vomiting since about 8 PM.  Pain is described as sharp , persistent in the epigastric area with multiple > 4NBNB vomiting . Last BM was day before presentation morning with persistent flatus. Mild leucocytosis, dehydrated , lactate is 2.1 . CT shows SBO. Pt was taken to the OR for diagnostic laproscopy which was converted to exploratory lap with ileostomy due to abdominal perforation, with gastric content spillage. ICU was consulted as pt was requiring pressor support during and post surgery. Pt had a long and complicated ICU stay, please refer to chart for full hospital course.   During ICU stay, pt. was found to be persistently hypotensive requiring vasopressor support. Pt. was initially started on meropenem, flagyl, and caspofungin (caspofungin and flagyl later discontinued). He remained intubated and sedated. His hospital course was complicated by metabolic acidemia, hyperkalemia, acute renal failure. Pt required dialysis for acute renal failure, he underwent multiple sessions. SAT was attempted, pt was off sedation but did not recover mental status even after several days. CT head was done, did not show any acute changes, and EEG was done, showed evidence for structural lesion and increased risk for seizures from L posterior region, severe diffuse/multifocal cerebral dysfunction, no seizures. CT abdomen showed persistence of SBO as well as evidence of bowel necrosis. He had also developed coagulopathy, with DVT and thrombocytopenia. LFTs trended up consistent with shock liver. Pt was not a candidate for returning to the OR. His condition continued to decline, he had 700 cc of feculent output through NGT. Presser requirement increased, family meeting was held, explained that pt was in multiorgan failure, and was dying. Family agreed to comfort measures, with continued mechanical ventilation. Pastoral support was provided. Pt was found to be in asystole, absent corneal reflexes, blown pupils, absent breath sounds. He was pronounced dead at 11:32 am with his family at bedside.

## 2023-06-27 NOTE — GOALS OF CARE CONVERSATION - ADVANCED CARE PLANNING - CONVERSATION DETAILS
Discussed worsening shock, worsening abdominal ischemia, multiorgan failure including liver, kidney. Pressors preclude HD, agreed no escalation of care. Will not escalate pressors. Continues to be DNR. Awaiting remainder of family to come to bedside and they will likely ask to withdraw care. Medications for analgesics ordered. Discussed worsening shock, worsening abdominal distension, multiorgan failure including liver, kidney. Pressors preclude HD, agreed no escalation of care. Will not escalate pressors. Continues to be DNR. Awaiting remainder of family to come to bedside and they will likely ask to withdraw care. Medications for analgesics ordered.

## 2023-06-29 NOTE — CHART NOTE - NSCHARTNOTEFT_GEN_A_CORE
I was informed that [facility] needed to be contacted regarding pt's cremation. I called [facility] yesterday, and was told by the office to give my name and phone number, and that somebody would reach out to us. As of this morning, no contact has been made from [facility] to us. I was informed that [facility] needed to be contacted regarding pt's cremation. I called [facility] yesterday, and was told by the office to give my name and phone number, and that somebody would reach out to us. This morning I spoke with [facility], and I was informed that the body will be released for cremation.

## 2023-06-29 NOTE — CHART NOTE - NSCHARTNOTESELECT_GEN_ALL_CORE
Event Note
Event Note
LineRemoval/Event Note
Neurology Consult
Non-tunneled catheter retraction 2cm/Event Note
Nutrition Services
Nutrition Services
Right Non-tunneled Catheter retracted 2cm/Event Note
called [facility]/Event Note
Bladder pressure/Event Note
Event Note
Event Note
FamilyTalk/Event Note
Nutrition Services

## 2023-12-10 NOTE — PROCEDURE NOTE - NSUS ED ADDITIONAL DETAIL1 FT
[facility]  INFECTIOUS DISEASES - [address], [place] [address]  Tel: [phone]     Fax: [phone]  =======================================================    MAYRA STEVENS [id number]    [facility]/test/[id number]    Growth in aerobic bottle: Gram Positive Cocci in Clusters  Growth in anaerobic bottle: Gram Positive Cocci in Clusters            All imaging and data are reviewed.  [facility]  INFECTIOUS DISEASES - [address], [place] [address]  Tel: [phone]     Fax: [phone]  =======================================================    MAYRA STEVENS [id number]    [facility]/test/[id number]    Growth in aerobic bottle: Gram Positive Cocci in Clusters  Growth in anaerobic bottle: Gram Positive Cocci in Clusters            All imaging and data are reviewed.  R DVT at common fem unable t compress and hyperechoic structure present in lumen  L common fem unable to compress, no clot at great saph take off, no clot at bifurcation of artery

## 2024-04-16 NOTE — PROVIDER CONTACT NOTE (CRITICAL VALUE NOTIFICATION) - DATE AND TIME:
Pt seen and examined. Full report to follow. DW Dr Bryant  Call placed to wife- left VM    Immunosuppressed patient with Liver transplant-03/2022-- 2 years ago  ESRD on HD-right chest HD-- missed HD last week  Hypotension/ Probable severe sepsis--on pressor support-- not clear source. If CRBSI/HD cath --Procalcitonin is elevated at 22  Leucopenia  Thrombocytopenia    Plan:  Continue Vanoc and Zosyn  Follow blood cultures  Trend serology-procal  Further transplant related ID issues- may need higher transplant ID services    Thanks    Patsy Thompson MD  [facility])  Office: [phone] -Option #8  Office fax:  [phone]        24-Jun-2023 12:44

## 2024-11-07 NOTE — ED ADULT NURSE NOTE - EXTENSIONS OF SELF_ADULT
Pt wife called and said that the spiriva is too much money oop for them a month she said it is 700.00 per month I told her to call [employer] and see what is comparable to Spiriva and also pay for and isiah them down and I would let Dr CHOU know and see if the Dr mancilla change the med for the patient she okay and that she would call today and get back with me   
None

## 2025-07-10 NOTE — PROGRESS NOTE ADULT - ENMT COMMENTS
Call Foot Doctor and PCP for follow-up in office.  Adequate hydration.   Medication for symptom management.   Return to ED for new or worsening/ symptoms.   
orally intubated, Appling sump tube in place
orally intubated, Ward Sump in place with bilious drainage

## (undated) DEVICE — TROCAR COVIDIEN VERSAPORT BLADELESS OPTICAL 5MM STANDARD

## (undated) DEVICE — GLV 7.5 PROTEXIS (WHITE)

## (undated) DEVICE — DRAPE LIGHT HANDLE COVER (BLUE)

## (undated) DEVICE — LIGASURE BLUNT TIP 37CM

## (undated) DEVICE — PACK GENERAL LAPAROSCOPY

## (undated) DEVICE — DRAPE 1/2 SHEET 40X57"

## (undated) DEVICE — Device

## (undated) DEVICE — SUT PDS II PLUS 1 96" TP-1

## (undated) DEVICE — BLADE SURGICAL #15 CARBON

## (undated) DEVICE — SUT ETHIBOND 2-0 44" EN3

## (undated) DEVICE — WARMING BLANKET UPPER ADULT

## (undated) DEVICE — FOR-ESU VALLEYLAB T7E14982DX: Type: DURABLE MEDICAL EQUIPMENT

## (undated) DEVICE — SUT BIOSYN 4-0 18" P-12

## (undated) DEVICE — ELCTR GROUNDING PAD ADULT COVIDIEN

## (undated) DEVICE — D HELP - CLEARVIEW CLEARIFY SYSTEM

## (undated) DEVICE — SYR LUER LOK 20CC

## (undated) DEVICE — SUT QUILL POLYPROPYLENE 1 15CM 22MM CLEAR

## (undated) DEVICE — TUBING STRYKER PNEUMOSURE HI FLOW INSUFFLATOR

## (undated) DEVICE — SOL IRR POUR NS 0.9% 1500ML

## (undated) DEVICE — SOL IRR POUR H2O 500ML

## (undated) DEVICE — SUT VICRYL 3-0 27" SH

## (undated) DEVICE — INSUFFLATION NDL COVIDIEN SURGINEEDLE VERESS 120MM

## (undated) DEVICE — TUBING STRYKEFLOW II SUCTION / IRRIGATOR

## (undated) DEVICE — NDL HYPO SAFE 25G X 1.5" (ORANGE)

## (undated) DEVICE — GLV 7 PROTEXIS (WHITE)

## (undated) DEVICE — VENODYNE/SCD SLEEVE CALF MEDIUM

## (undated) DEVICE — DRSG MASTISOL